# Patient Record
Sex: MALE | Race: WHITE | NOT HISPANIC OR LATINO | Employment: OTHER | URBAN - METROPOLITAN AREA
[De-identification: names, ages, dates, MRNs, and addresses within clinical notes are randomized per-mention and may not be internally consistent; named-entity substitution may affect disease eponyms.]

---

## 2017-01-18 ENCOUNTER — OFFICE VISIT (OUTPATIENT)
Dept: NEUROLOGY | Facility: CLINIC | Age: 77
End: 2017-01-18

## 2017-01-18 VITALS
HEART RATE: 51 BPM | DIASTOLIC BLOOD PRESSURE: 92 MMHG | BODY MASS INDEX: 25.2 KG/M2 | WEIGHT: 176 LBS | OXYGEN SATURATION: 96 % | HEIGHT: 70 IN | SYSTOLIC BLOOD PRESSURE: 184 MMHG

## 2017-01-18 DIAGNOSIS — G31.09 FRONTOTEMPORAL DEMENTIA (HCC): Primary | ICD-10-CM

## 2017-01-18 DIAGNOSIS — F02.80 FRONTOTEMPORAL DEMENTIA (HCC): Primary | ICD-10-CM

## 2017-01-18 PROCEDURE — 99213 OFFICE O/P EST LOW 20 MIN: CPT | Performed by: NURSE PRACTITIONER

## 2017-01-18 RX ORDER — RIVASTIGMINE 13.3 MG/24H
1 PATCH, EXTENDED RELEASE TRANSDERMAL DAILY
Qty: 90 PATCH | Refills: 1 | Status: SHIPPED | OUTPATIENT
Start: 2017-01-18 | End: 2017-07-19 | Stop reason: SDUPTHER

## 2017-01-18 RX ORDER — NEBIVOLOL HYDROCHLORIDE 10 MG/1
TABLET ORAL
COMMUNITY
Start: 2017-01-10 | End: 2017-02-15 | Stop reason: SDUPTHER

## 2017-01-18 NOTE — MR AVS SNAPSHOT
Calos JORDY Genaro   1/18/2017 1:00 PM   Office Visit    Dept Phone:  [phone]   Encounter #:  [id number]    Provider:  ERIKA De La Rosa   Department:  [facility] NEUROLOGY                Your Full Care Plan              Today's Medication Changes          These changes are accurate as of: 1/18/17  1:28 PM.  If you have any questions, ask your nurse or doctor.               New Medication(s)Ordered:     rivastigmine 13.3 MG/24HR patch   Commonly known as:  EXELON   Place 1 patch on the skin Daily.   Replaces:  rivastigmine 9.5 MG/24HR patch         Medication(s)that have changed:     BYSTOLIC 10 MG tablet   Generic drug:  nebivolol   What changed:  Another medication with the same name was removed. Continue taking this medication, and follow the directions you see here.         Stop taking medication(s)listed here:     rivastigmine 9.5 MG/24HR patch   Commonly known as:  EXELON   Replaced by:  rivastigmine 13.3 MG/24HR patch                Where to Get Your Medications      These medications were sent to [facility] [id number] - [place], [place] - [address] [place] [address]. - [phone]  - [phone] [address], [place] [address]     Phone:  [phone]     rivastigmine 13.3 MG/24HR patch                  Your Updated Medication List          This list is accurate as of: 1/18/17  1:28 PM.  Always use your most recent med list.                amLODIPine 10 MG tablet   Commonly known as:  NORVASC       bacitracin 500 UNIT/GM ointment   Apply  topically 2 (two) times a day.       BYSTOLIC 10 MG tablet   Generic drug:  nebivolol       cephalexin 500 MG capsule   Commonly known as:  KEFLEX       glucose monitoring kit monitoring kit   1 each As Needed (blood sugar check).       pravastatin 40 MG tablet   Commonly known as:  PRAVACHOL       rivastigmine 13.3 MG/24HR patch   Commonly known as:  EXELON   Place 1 patch on the skin Daily.       TEKTURNA 300 MG tablet   Generic drug:  aliskiren       vitamin D 02282 UNITS capsule capsule   Commonly known as:  ERGOCALCIFEROL               Instructions     None    Patient Instructions History      Upcoming Appointments     Visit Type Date Time Department    FOLLOW UP 2017  1:00 PM [facility]    NEW PATIENT 2/15/2017  3:30 PM [facility]    FOLLOW UP 2017  1:00 PM [facility]    FOLLOW UP SLEEP CLINIC 2017 10:45 AM [facility]      K[employer] Signup     [facility] sailsquare allows you to send messages to your doctor, view your test results, renew your prescriptions, schedule appointments, and more. To sign up, go to [employer] and click on the Sign Up Now link in the New User? box. Enter your sailsquare Activation Code exactly as it appears below along with the last four digits of your Social Security Number and your Date of Birth () to complete the sign-up process. If you do not sign up before the expiration date, you must request a new code.    sailsquare Activation Code: [email or web address]  Expires: 2017  1:28 PM    If you have questions, you can email [email or web address] or call [phone] to talk to our sailsquare staff. Remember, sailsquare is NOT to be used for urgent needs. For medical emergencies, dial 911.               Other Info from Your Visit           Your Appointments     Feb 15, 2017  3:30 PM EST   New Patient with Karlie Hart MD   [facility] INTERNAL MEDICINE (--)    [address]  [facility] [address] [phone]           Bring all previous medical records and films, along with current medications and insurance information.            2017  1:00 PM EDT   Follow Up with ERIKA De La Rosa   [facility] NEUROLOGY (--)    [address]  [facility] [address] [phone]           Arrive 15 minutes prior to appointment.            Dec 06, 2017  "10:45 AM EST   Follow Up Sleep Clinic with  [facility] PROVID[facility] SCHEDULE   [facility] ([place])    [address] [facility] [address] [phone]            1.  If you are currently on a CPAP or BiPAP please bring in your SD card or memory card.  2.  If you are experiencing problems with your current mask, please bring your mask with you to your appointment.                Allergies     Beta Adrenergic Blockers      Sulfa Antibiotics        Reason for Visit     Dementia           Vital Signs     Blood Pressure Pulse Height Weight Oxygen Saturation Body Mass Index    184/92 51 70\" (177.8 cm) 176 lb (79.8 kg) 96% 25.25 kg/m2    Smoking Status                   Former Smoker             "
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
## 2017-02-15 ENCOUNTER — OFFICE VISIT (OUTPATIENT)
Dept: INTERNAL MEDICINE | Facility: CLINIC | Age: 77
End: 2017-02-15

## 2017-02-15 VITALS
WEIGHT: 174 LBS | OXYGEN SATURATION: 98 % | HEIGHT: 70 IN | BODY MASS INDEX: 24.91 KG/M2 | SYSTOLIC BLOOD PRESSURE: 150 MMHG | HEART RATE: 56 BPM | DIASTOLIC BLOOD PRESSURE: 80 MMHG

## 2017-02-15 DIAGNOSIS — E11.9 TYPE 2 DIABETES MELLITUS WITHOUT COMPLICATION, UNSPECIFIED LONG TERM INSULIN USE STATUS: ICD-10-CM

## 2017-02-15 DIAGNOSIS — I10 ESSENTIAL HYPERTENSION: ICD-10-CM

## 2017-02-15 DIAGNOSIS — Z00.00 MEDICARE ANNUAL WELLNESS VISIT, SUBSEQUENT: Primary | ICD-10-CM

## 2017-02-15 DIAGNOSIS — E78.2 MIXED HYPERLIPIDEMIA: ICD-10-CM

## 2017-02-15 PROCEDURE — 90670 PCV13 VACCINE IM: CPT | Performed by: INTERNAL MEDICINE

## 2017-02-15 PROCEDURE — 90471 IMMUNIZATION ADMIN: CPT | Performed by: INTERNAL MEDICINE

## 2017-02-15 PROCEDURE — G0439 PPPS, SUBSEQ VISIT: HCPCS | Performed by: INTERNAL MEDICINE

## 2017-02-15 PROCEDURE — 99214 OFFICE O/P EST MOD 30 MIN: CPT | Performed by: INTERNAL MEDICINE

## 2017-02-15 PROCEDURE — 93000 ELECTROCARDIOGRAM COMPLETE: CPT | Performed by: INTERNAL MEDICINE

## 2017-02-15 RX ORDER — NEBIVOLOL HYDROCHLORIDE 10 MG/1
5 TABLET ORAL 2 TIMES DAILY
COMMUNITY
Start: 2017-02-15 | End: 2017-08-31 | Stop reason: SDUPTHER

## 2017-02-15 RX ORDER — VALSARTAN AND HYDROCHLOROTHIAZIDE 160; 12.5 MG/1; MG/1
1 TABLET, FILM COATED ORAL DAILY
Qty: 30 TABLET | Refills: 0 | Status: SHIPPED | OUTPATIENT
Start: 2017-02-15 | End: 2017-03-12 | Stop reason: SDUPTHER

## 2017-02-15 NOTE — PROGRESS NOTES
Subjective     Calos Lam is a 76 y.o. male who presents for an annual wellness visit as well as check up of dm, htn, hld.        History of Present Illness     Dm-2.  By A1c, he is a mild well controlled diabetic.    HTN.  Problem recently.  Not well controlled with current regimen.  HLD.  He is maintained on pravastatin without issue and is due for labs.     Review of Systems   Respiratory: Negative.    Cardiovascular: Negative.        The following portions of the patient's history were reviewed and updated as appropriate: allergies, current medications, past family history, past medical history, past social history, past surgical history and problem list.     Patient Active Problem List    Diagnosis Date Noted   • History of bladder cancer 02/18/2017   • RLS (restless legs syndrome) 02/18/2017   • Type 2 diabetes mellitus without complication 02/15/2017   • Essential hypertension    • Mixed hyperlipidemia    • Frontotemporal dementia 01/18/2017   • FIORELLA (obstructive sleep apnea) intolerent CPAP 12/11/2016   • Sleep-related hypoventilation/hypoxia treated with O2 2L NC at HS 12/11/2016       Past Medical History   Diagnosis Date   • Essential hypertension    • GERD (gastroesophageal reflux disease)    • Impacted cerumen    • Impotence of organic origin    • Malaise and fatigue    • Mantoux: positive    • Memory loss    • Mixed hyperlipidemia    • Organic sleep apnea    • Pain in thoracic spine    • Primary malignant neoplasm of bladder 09/2009   • Restless leg syndrome, uncontrolled    • Testicular hypofunction    • Vertigo    • Vitamin D deficiency        Past Surgical History   Procedure Laterality Date   • Colonoscopy  07/31/2012     normal   • Eye surgery  2014     lasik. Dr. Storey   • Turp / transurethral incision / drainage prostate  1980   • Root canal     • Appendectomy  1951   • Lasik Bilateral 2003   • Colon resection left Left 2005       Family History   Problem Relation Age of Onset   • Diabetes  "Sister    • COPD Sister    • Diabetes Brother    • Heart disease Brother    • Hypertension Brother    • Dementia Maternal Grandmother    • Alcohol abuse Neg Hx        Social History     Social History   • Marital status:      Spouse name: N/A   • Number of children: 1   • Years of education: N/A     Occupational History   • [employer] Executive      Social History Main Topics   • Smoking status: Former Smoker     Types: Cigarettes     Quit date: 1973   • Smokeless tobacco: Not on file   • Alcohol use No   • Drug use: No   • Sexual activity: Not on file     Other Topics Concern   • Not on file     Social History Narrative       Current Outpatient Prescriptions on File Prior to Visit   Medication Sig Dispense Refill   • amLODIPine (NORVASC) 10 MG tablet Take 1 tablet by mouth daily.     • bacitracin 500 UNIT/GM ointment Apply  topically 2 (two) times a day. 30 g 0   • cephalexin (KEFLEX) 500 MG capsule      • glucose monitoring kit (FREESTYLE) monitoring kit 1 each As Needed (blood sugar check). 1 each 0   • pravastatin (PRAVACHOL) 40 MG tablet Take 1 tablet by mouth daily.     • rivastigmine (EXELON) 13.3 MG/24HR patch Place 1 patch on the skin Daily. 90 patch 1   • vitamin D (ERGOCALCIFEROL) 18626 UNITS capsule capsule Take 1 capsule by mouth 1 (one) time per week.       No current facility-administered medications on file prior to visit.        Allergies   Allergen Reactions   • Beta Adrenergic Blockers    • Sulfa Antibiotics        Immunization History   Administered Date(s) Administered   • Pneumococcal Conjugate 13-Valent 02/15/2017   • Td, Not Adsorbed 05/04/2014   • Zoster 01/01/2013       Objective     Visit Vitals   • /80   • Pulse 56   • Ht 70\" (177.8 cm)   • Wt 174 lb (78.9 kg)   • SpO2 98%   • BMI 24.97 kg/m2       Physical Exam   Constitutional: He is oriented to person, place, and time. He appears well-developed and well-nourished.   HENT:   Head: Normocephalic and atraumatic.   Right Ear: " Hearing and tympanic membrane normal.   Left Ear: Hearing and tympanic membrane normal.   Mouth/Throat: No posterior oropharyngeal erythema.   Neck: Neck supple. No thyromegaly present.   Cardiovascular: Normal rate, regular rhythm and normal heart sounds.    No murmur heard.  Pulmonary/Chest: Effort normal and breath sounds normal.   Abdominal: Soft. He exhibits no distension. There is no hepatosplenomegaly. There is no tenderness.   Lymphadenopathy:     He has no cervical adenopathy.   Neurological: He is alert and oriented to person, place, and time.   Skin: Skin is warm and dry.   Psychiatric: He has a normal mood and affect.       Assessment/Plan   Calos was seen today for annual wellness visit.    Diagnoses and all orders for this visit:    Medicare annual wellness visit, subsequent  -     CBC & Differential  -     Comprehensive Metabolic Panel  -     Lipid Panel  -     TSH Rfx On Abnormal To Free T4  -     Urinalysis With Microscopic  -     Hemoglobin A1c  -     Microalbumin / Creatinine Urine Ratio    Type 2 diabetes mellitus without complication, unspecified long term insulin use status  -     CBC & Differential  -     Comprehensive Metabolic Panel  -     Lipid Panel  -     TSH Rfx On Abnormal To Free T4  -     Urinalysis With Microscopic  -     Hemoglobin A1c  -     Microalbumin / Creatinine Urine Ratio    Essential hypertension  -     CBC & Differential  -     Comprehensive Metabolic Panel  -     Lipid Panel  -     TSH Rfx On Abnormal To Free T4  -     Urinalysis With Microscopic  -     Hemoglobin A1c  -     Microalbumin / Creatinine Urine Ratio  -     ECG 12 Lead    Mixed hyperlipidemia  -     CBC & Differential  -     Comprehensive Metabolic Panel  -     Lipid Panel  -     TSH Rfx On Abnormal To Free T4  -     Urinalysis With Microscopic  -     Hemoglobin A1c  -     Microalbumin / Creatinine Urine Ratio    Other orders  -     Pneumococcal Conjugate Vaccine 13-Valent All  -      valsartan-hydrochlorothiazide (DIOVAN HCT) 160-12.5 MG per tablet; Take 1 tablet by mouth Daily.  -     Microscopic Examination          ECG 12 Lead  Date/Time: 2/15/2017 3:58 PM  Performed by: FLAQUITO HART  Authorized by: FLAQUITO HART                 Discussion    AWV.  See scanned forms for carrol history, PHQ-9, functional ability questionnaire, cognitive impairment screening.  These were all reviewed with the patient and the patient was provided with a personal prevention plan of service in patient instructions.    DM-2.  Check labs today.    HTN.  Uncontrolled.  D/c Tekturna and trial of Diovan HCT.    HLD.  Continue pravastatin and check labs.      Health Maintenance   Topic Date Due   • DIABETIC FOOT EXAM  04/18/2016   • DIABETIC EYE EXAM  04/18/2016   • ZOSTER VACCINE  02/15/2017   • MEDICARE ANNUAL WELLNESS  02/15/2017   • COLONOSCOPY  07/31/2017   • HEMOGLOBIN A1C  08/15/2017   • PNEUMOCOCCAL VACCINES (65+ LOW/MEDIUM RISK) (2 of 2 - PPSV23) 02/15/2018   • LIPID PANEL  02/15/2018   • URINE MICROALBUMIN  02/15/2018   • TDAP/TD VACCINES (2 - Td) 02/15/2027   • INFLUENZA VACCINE  Completed            Future Appointments  Date Time Provider Department Center   3/1/2017 1:00 PM Flaquito Hart MD [facility] None   7/19/2017 1:00 PM ERIKA De La RosaK [facility] None   12/6/2017 10:45 AM  [facility] PROVIDER SCHEDULE  [facility]

## 2017-02-16 LAB
ALBUMIN SERPL-MCNC: 4.2 G/DL (ref 3.5–5.2)
ALBUMIN/CREAT UR: 34.8 MG/G CREAT (ref 0–30)
ALBUMIN/GLOB SERPL: 1.5 G/DL
ALP SERPL-CCNC: 63 U/L (ref 39–117)
ALT SERPL-CCNC: 14 U/L (ref 1–41)
APPEARANCE UR: CLEAR
AST SERPL-CCNC: 16 U/L (ref 1–40)
BACTERIA #/AREA URNS HPF: ABNORMAL /HPF
BASOPHILS # BLD AUTO: 0.02 10*3/MM3 (ref 0–0.2)
BASOPHILS NFR BLD AUTO: 0.3 % (ref 0–1.5)
BILIRUB SERPL-MCNC: 0.5 MG/DL (ref 0.1–1.2)
BILIRUB UR QL STRIP: NEGATIVE
BUN SERPL-MCNC: 20 MG/DL (ref 8–23)
BUN/CREAT SERPL: 21.1 (ref 7–25)
CALCIUM SERPL-MCNC: 9.4 MG/DL (ref 8.6–10.5)
CASTS URNS MICRO: ABNORMAL
CHLORIDE SERPL-SCNC: 102 MMOL/L (ref 98–107)
CHOLEST SERPL-MCNC: 184 MG/DL (ref 0–200)
CO2 SERPL-SCNC: 27.3 MMOL/L (ref 22–29)
COLOR UR: YELLOW
CREAT SERPL-MCNC: 0.95 MG/DL (ref 0.76–1.27)
CREAT UR-MCNC: 149 MG/DL
EOSINOPHIL # BLD AUTO: 0.15 10*3/MM3 (ref 0–0.7)
EOSINOPHIL NFR BLD AUTO: 2.2 % (ref 0.3–6.2)
EPI CELLS #/AREA URNS HPF: ABNORMAL /HPF
ERYTHROCYTE [DISTWIDTH] IN BLOOD BY AUTOMATED COUNT: 13.3 % (ref 11.5–14.5)
GLOBULIN SER CALC-MCNC: 2.8 GM/DL
GLUCOSE SERPL-MCNC: 99 MG/DL (ref 65–99)
GLUCOSE UR QL: NEGATIVE
HBA1C MFR BLD: 6.04 % (ref 4.8–5.6)
HCT VFR BLD AUTO: 47.7 % (ref 40.4–52.2)
HDLC SERPL-MCNC: 51 MG/DL (ref 40–60)
HGB BLD-MCNC: 16.4 G/DL (ref 13.7–17.6)
HGB UR QL STRIP: NEGATIVE
IMM GRANULOCYTES # BLD: 0 10*3/MM3 (ref 0–0.03)
IMM GRANULOCYTES NFR BLD: 0 % (ref 0–0.5)
KETONES UR QL STRIP: NEGATIVE
LDLC SERPL CALC-MCNC: 113 MG/DL (ref 0–100)
LEUKOCYTE ESTERASE UR QL STRIP: NEGATIVE
LYMPHOCYTES # BLD AUTO: 1.54 10*3/MM3 (ref 0.9–4.8)
LYMPHOCYTES NFR BLD AUTO: 22.7 % (ref 19.6–45.3)
MCH RBC QN AUTO: 31.7 PG (ref 27–32.7)
MCHC RBC AUTO-ENTMCNC: 34.4 G/DL (ref 32.6–36.4)
MCV RBC AUTO: 92.1 FL (ref 79.8–96.2)
MICROALBUMIN UR-MCNC: 51.9 UG/ML
MONOCYTES # BLD AUTO: 0.6 10*3/MM3 (ref 0.2–1.2)
MONOCYTES NFR BLD AUTO: 8.8 % (ref 5–12)
NEUTROPHILS # BLD AUTO: 4.48 10*3/MM3 (ref 1.9–8.1)
NEUTROPHILS NFR BLD AUTO: 66 % (ref 42.7–76)
NITRITE UR QL STRIP: NEGATIVE
PH UR STRIP: 6 [PH] (ref 5–8)
PLATELET # BLD AUTO: 227 10*3/MM3 (ref 140–500)
POTASSIUM SERPL-SCNC: 4.4 MMOL/L (ref 3.5–5.2)
PROT SERPL-MCNC: 7 G/DL (ref 6–8.5)
PROT UR QL STRIP: (no result)
RBC # BLD AUTO: 5.18 10*6/MM3 (ref 4.6–6)
RBC #/AREA URNS HPF: ABNORMAL /HPF
SODIUM SERPL-SCNC: 143 MMOL/L (ref 136–145)
SP GR UR: 1.02 (ref 1–1.03)
TRIGL SERPL-MCNC: 100 MG/DL (ref 0–150)
TSH SERPL DL<=0.005 MIU/L-ACNC: 1.14 MIU/ML (ref 0.27–4.2)
UROBILINOGEN UR STRIP-MCNC: (no result) MG/DL
VLDLC SERPL CALC-MCNC: 20 MG/DL (ref 5–40)
WBC # BLD AUTO: 6.79 10*3/MM3 (ref 4.5–10.7)
WBC #/AREA URNS HPF: ABNORMAL /HPF

## 2017-02-18 PROBLEM — Z85.51 HISTORY OF BLADDER CANCER: Status: ACTIVE | Noted: 2017-02-18

## 2017-02-18 PROBLEM — G25.81 RLS (RESTLESS LEGS SYNDROME): Status: ACTIVE | Noted: 2017-02-18

## 2017-02-18 NOTE — PATIENT INSTRUCTIONS
Medicare Wellness  Personal Prevention Plan of Service     Date of Office Visit:  02/15/2017  Encounter Provider:  Karlie Hart MD  Place of Service:  [facility] INTERNAL MEDICINE  Patient Name: Calos Lam  :  1940    As part of the Medicare Wellness portion of your visit today, we are providing you with this personalized preventive plan of services (PPPS). This plan is based upon recommendations of the [place] Preventive Services Task Force (USPSTF) and the Advisory Committee on Immunization Practices (ACIP).    This lists the preventive care services that should be considered, and provides dates of when you are due. Items listed as completed are up-to-date and do not require any further intervention.    Health Maintenance   Topic Date Due   • DIABETIC FOOT EXAM  2016   • DIABETIC EYE EXAM  2016   • ZOSTER VACCINE  02/15/2017   • MEDICARE ANNUAL WELLNESS  02/15/2017   • COLONOSCOPY  2017   • HEMOGLOBIN A1C  08/15/2017   • PNEUMOCOCCAL VACCINES (65+ LOW/MEDIUM RISK) (2 of 2 - PPSV23) 02/15/2018   • LIPID PANEL  02/15/2018   • URINE MICROALBUMIN  02/15/2018   • TDAP/TD VACCINES (2 - Td) 02/15/2027   • INFLUENZA VACCINE  Completed         Calos was seen today for annual wellness visit.    Diagnoses and all orders for this visit:    Medicare annual wellness visit, subsequent  -     CBC & Differential  -     Comprehensive Metabolic Panel  -     Lipid Panel  -     TSH Rfx On Abnormal To Free T4  -     Urinalysis With Microscopic  -     Hemoglobin A1c  -     Microalbumin / Creatinine Urine Ratio    Type 2 diabetes mellitus without complication, unspecified long term insulin use status  -     CBC & Differential  -     Comprehensive Metabolic Panel  -     Lipid Panel  -     TSH Rfx On Abnormal To Free T4  -     Urinalysis With Microscopic  -     Hemoglobin A1c  -     Microalbumin / Creatinine Urine Ratio    Essential hypertension  -     CBC & Differential  -      Comprehensive Metabolic Panel  -     Lipid Panel  -     TSH Rfx On Abnormal To Free T4  -     Urinalysis With Microscopic  -     Hemoglobin A1c  -     Microalbumin / Creatinine Urine Ratio  -     ECG 12 Lead    Mixed hyperlipidemia  -     CBC & Differential  -     Comprehensive Metabolic Panel  -     Lipid Panel  -     TSH Rfx On Abnormal To Free T4  -     Urinalysis With Microscopic  -     Hemoglobin A1c  -     Microalbumin / Creatinine Urine Ratio    Other orders  -     Pneumococcal Conjugate Vaccine 13-Valent All  -     valsartan-hydrochlorothiazide (DIOVAN HCT) 160-12.5 MG per tablet; Take 1 tablet by mouth Daily.  -     Microscopic Examination          ECG 12 Lead/Time: 2/15/2017 3:58 PM  Performed by: FLAQUITO DOS SANTOS  Authorized by: FLAQUITO DOS SANTOS                 Discussion    AWV.  See scanned forms for carrol history, PHQ-9, functional ability questionnaire, cognitive impairment screening.  These were all reviewed with the patient and the patient was provided with a personal prevention plan of service in patient instructions.    DM-2.  Check labs today.    HTN.  Uncontrolled.  D/c Tekturna and trial of Diovan HCT.    HLD.  Continue pravastatin and check labs.      Health Maintenance   Topic Date Due   • DIABETIC FOOT EXAM  04/18/2016   • DIABETIC EYE EXAM  04/18/2016   • ZOSTER VACCINE  02/15/2017   • MEDICARE ANNUAL WELLNESS  02/15/2017   • COLONOSCOPY  07/31/2017   • HEMOGLOBIN A1C  08/15/2017   • PNEUMOCOCCAL VACCINES (65+ LOW/MEDIUM RISK) (2 of 2 - PPSV23) 02/15/2018   • LIPID PANEL  02/15/2018   • URINE MICROALBUMIN  02/15/2018   • TDAP/TD VACCINES (2 - Td) 02/15/2027   • INFLUENZA VACCINE  Completed

## 2017-03-01 ENCOUNTER — OFFICE VISIT (OUTPATIENT)
Dept: INTERNAL MEDICINE | Facility: CLINIC | Age: 77
End: 2017-03-01

## 2017-03-01 VITALS
HEIGHT: 71 IN | HEART RATE: 57 BPM | BODY MASS INDEX: 24.5 KG/M2 | OXYGEN SATURATION: 98 % | SYSTOLIC BLOOD PRESSURE: 140 MMHG | WEIGHT: 175 LBS | DIASTOLIC BLOOD PRESSURE: 80 MMHG

## 2017-03-01 DIAGNOSIS — I10 ESSENTIAL HYPERTENSION: Primary | ICD-10-CM

## 2017-03-01 PROCEDURE — 99212 OFFICE O/P EST SF 10 MIN: CPT | Performed by: INTERNAL MEDICINE

## 2017-03-01 NOTE — PROGRESS NOTES
"Subjective     Calos Lam is a 76 y.o. male who presents with   Chief Complaint   Patient presents with   • Hypertension       History of Present Illness     HTN.  Good control with change to from Tekturna to Diovan HCT.  No side effects.    Review of Systems    The following portions of the patient's history were reviewed and updated as appropriate: allergies, current medications and problem list.    Patient Active Problem List    Diagnosis Date Noted   • History of bladder cancer 02/18/2017   • RLS (restless legs syndrome) 02/18/2017   • Type 2 diabetes mellitus without complication 02/15/2017   • Essential hypertension    • Mixed hyperlipidemia    • Frontotemporal dementia 01/18/2017   • FIORELLA (obstructive sleep apnea) intolerent CPAP 12/11/2016   • Sleep-related hypoventilation/hypoxia treated with O2 2L NC at HS 12/11/2016       Current Outpatient Prescriptions on File Prior to Visit   Medication Sig Dispense Refill   • amLODIPine (NORVASC) 10 MG tablet Take 1 tablet by mouth daily.     • bacitracin 500 UNIT/GM ointment Apply  topically 2 (two) times a day. 30 g 0   • BYSTOLIC 10 MG tablet Take 0.5 tablets by mouth 2 (Two) Times a Day.     • glucose monitoring kit (FREESTYLE) monitoring kit 1 each As Needed (blood sugar check). 1 each 0   • pravastatin (PRAVACHOL) 40 MG tablet Take 1 tablet by mouth daily.     • rivastigmine (EXELON) 13.3 MG/24HR patch Place 1 patch on the skin Daily. 90 patch 1   • valsartan-hydrochlorothiazide (DIOVAN HCT) 160-12.5 MG per tablet Take 1 tablet by mouth Daily. 30 tablet 0   • vitamin D (ERGOCALCIFEROL) 24242 UNITS capsule capsule Take 1 capsule by mouth 1 (one) time per week.     • [DISCONTINUED] cephalexin (KEFLEX) 500 MG capsule        No current facility-administered medications on file prior to visit.        Objective     Visit Vitals   • /80   • Pulse 57   • Ht 71\" (180.3 cm)   • Wt 175 lb (79.4 kg)   • SpO2 98%   • BMI 24.41 kg/m2       Physical Exam "   Constitutional: He is oriented to person, place, and time. He appears well-developed and well-nourished.   HENT:   Head: Atraumatic.   Neurological: He is alert and oriented to person, place, and time.   Psychiatric: He has a normal mood and affect.       Assessment/Plan   Calos was seen today for hypertension.    Diagnoses and all orders for this visit:    Essential hypertension  -     Basic Metabolic Panel        Discussion  HTN F/u.  Continue current regimen.  Check BMP.         Future Appointments  Date Time Provider Department Center   7/19/2017 1:00 PM ERIKA De La Rosa N [facility] None   9/1/2017 1:15 PM Karlie Hart MD [facility] None   12/6/2017 10:45 AM  [facility] PROVIDER SCHEDULE  [facility]

## 2017-03-02 LAB
BUN SERPL-MCNC: 26 MG/DL (ref 8–23)
BUN/CREAT SERPL: 23.6 (ref 7–25)
CALCIUM SERPL-MCNC: 9.7 MG/DL (ref 8.6–10.5)
CHLORIDE SERPL-SCNC: 101 MMOL/L (ref 98–107)
CO2 SERPL-SCNC: 28.3 MMOL/L (ref 22–29)
CREAT SERPL-MCNC: 1.1 MG/DL (ref 0.76–1.27)
GLUCOSE SERPL-MCNC: 146 MG/DL (ref 65–99)
POTASSIUM SERPL-SCNC: 4.2 MMOL/L (ref 3.5–5.2)
SODIUM SERPL-SCNC: 144 MMOL/L (ref 136–145)

## 2017-03-13 RX ORDER — VALSARTAN AND HYDROCHLOROTHIAZIDE 160; 12.5 MG/1; MG/1
TABLET, FILM COATED ORAL
Qty: 30 TABLET | Refills: 0 | Status: SHIPPED | OUTPATIENT
Start: 2017-03-13 | End: 2017-04-17 | Stop reason: SDUPTHER

## 2017-04-17 RX ORDER — VALSARTAN AND HYDROCHLOROTHIAZIDE 160; 12.5 MG/1; MG/1
TABLET, FILM COATED ORAL
Qty: 30 TABLET | Refills: 0 | Status: SHIPPED | OUTPATIENT
Start: 2017-04-17 | End: 2017-05-09 | Stop reason: SDUPTHER

## 2017-05-09 RX ORDER — VALSARTAN AND HYDROCHLOROTHIAZIDE 160; 12.5 MG/1; MG/1
TABLET, FILM COATED ORAL
Qty: 30 TABLET | Refills: 0 | Status: SHIPPED | OUTPATIENT
Start: 2017-05-09 | End: 2017-06-17 | Stop reason: SDUPTHER

## 2017-06-19 RX ORDER — VALSARTAN AND HYDROCHLOROTHIAZIDE 160; 12.5 MG/1; MG/1
TABLET, FILM COATED ORAL
Qty: 30 TABLET | Refills: 5 | Status: SHIPPED | OUTPATIENT
Start: 2017-06-19 | End: 2017-08-31 | Stop reason: SDUPTHER

## 2017-07-19 ENCOUNTER — OFFICE VISIT (OUTPATIENT)
Dept: NEUROLOGY | Facility: CLINIC | Age: 77
End: 2017-07-19

## 2017-07-19 VITALS
WEIGHT: 168 LBS | OXYGEN SATURATION: 96 % | DIASTOLIC BLOOD PRESSURE: 68 MMHG | BODY MASS INDEX: 23.52 KG/M2 | HEART RATE: 49 BPM | SYSTOLIC BLOOD PRESSURE: 134 MMHG | HEIGHT: 71 IN

## 2017-07-19 DIAGNOSIS — G31.09 FRONTOTEMPORAL DEMENTIA (HCC): Primary | ICD-10-CM

## 2017-07-19 DIAGNOSIS — F02.80 FRONTOTEMPORAL DEMENTIA (HCC): Primary | ICD-10-CM

## 2017-07-19 PROCEDURE — 99212 OFFICE O/P EST SF 10 MIN: CPT | Performed by: NURSE PRACTITIONER

## 2017-07-19 RX ORDER — RIVASTIGMINE 13.3 MG/24H
1 PATCH, EXTENDED RELEASE TRANSDERMAL DAILY
Qty: 90 PATCH | Refills: 1 | Status: SHIPPED | OUTPATIENT
Start: 2017-07-19 | End: 2018-03-16 | Stop reason: SDUPTHER

## 2017-07-19 NOTE — PROGRESS NOTES
Subjective:     Patient ID: Calos Lam is a 76 y.o. male presenting for follow up for frontotemporal dementia. My last visit with the patient was on November 15, 2016. He is maintained on Exelon 13.3 mg/ 24 hours. He has been doing very well. He remains active. He walks at least 2-3 days per week and works in his garden daily. 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 Since his last visit he has stopped drinking alcohol entirely. Overall he and his wife feel he is doing very well.    Dementia   Pertinent negatives include no abdominal pain, chest pain, fatigue, headaches, nausea, neck pain, numbness, rash, vomiting or weakness.     The following portions of the patient's history were reviewed and updated as appropriate: allergies, current medications, past family history, past medical history, past social history, past surgical history and problem list.    Review of Systems   Constitutional: Negative for activity change, appetite change and fatigue.   HENT: Negative for facial swelling, hearing loss and trouble swallowing.    Eyes: Negative for photophobia, pain and visual disturbance.   Respiratory: Negative for choking, chest tightness and shortness of breath.    Cardiovascular: Negative for chest pain, palpitations and leg swelling.   Gastrointestinal: Negative for abdominal pain, nausea and vomiting.   Endocrine: Negative for polydipsia and polyphagia.   Genitourinary: Positive for frequency and urgency. Negative for difficulty urinating.   Musculoskeletal: Negative for back pain, gait problem and neck pain.   Skin: Negative for color change, rash and wound.   Allergic/Immunologic: Negative for environmental allergies, food allergies and immunocompromised state.   Neurological: Negative for dizziness, tremors, seizures, syncope, facial asymmetry, speech difficulty, weakness, light-headedness, numbness and headaches.   Hematological: Negative for adenopathy. Does not bruise/bleed easily.    Psychiatric/Behavioral: Positive for decreased concentration. Negative for agitation, behavioral problems, confusion, dysphoric mood, hallucinations, self-injury, sleep disturbance and suicidal ideas. The patient is not nervous/anxious and is not hyperactive.         Objective:    The following exam was performed today and there has been no change since his last visit.    Neurologic Exam  Neurologic Exam  This patient was well-developed, well-nourished and in no acute distress.      GAIT: Gait, station, heel, toe and tandem walk were normal. There was no drift of the arms. Romberg’s sign was not present.       VASCULAR: The carotid arteries had normal upstroke to palpation without bruits. The vertebral arteries were without bruits. The radial pulses were equal and without delay. Cardiac examination revealed no murmurs with a regular rhythm. Pedal pulses were normal. The extremities were without cyanosis, clubbing or edema.      MENTAL STATUS: This patient was alert and oriented to person, place, time and situation. Recent and remote memory - intact. Attention span, fund of knowledge and concentration were normal. More in depth memory testing-reviewed Dr. Flaherty's report and reviewed brain CT images CRANIAL NERVES: Olfaction-not tested. Visual fields full in all quadrants to confrontation. The pupils were equally round and reactive to light at 3-4 mm. There was no evidence of a Sanchez Essence pupil. Funduscopic exam revealed no papilledema, hemorrhage or exudate. The gaze was conjugate. The vertical and horizontal eye movements were full without nystagmus. There was no facial weakness. There was no facial sensory loss to pinprick bilaterally. Hearing was normal for light finger rub and casual speech. Speech is normal with trace  dysarthria. The neck is supple and strength is normal in rotation, flexion and extension. Tongue and palate movements are normal. He has had eyelid surgery for ptosis recently      MOTOR UPPER  EXTREMTIES: Bilaterally the deltoid, biceps, triceps, wrist extensors, intrinsic hand muscles, and  were grade 5 and normal. Bulk, tone and strength of these muscles were normal without abnormal movements.      MOTOR LOWER EXTREMITIES: Bilaterally the hip flexors, hip abductors, knee flexors and extensors, ankle plantar flexors and dorsiflexors were grade 5 with normal. Bulk, tone and strength of these muscles were normal without abnormal movements.  DEEP TENDON REFLEXES: Bilateral biceps, triceps, and brachioradialis reflexes were grade 1 symmetric. Bilateral knee, hamstrings and ankle reflexes were grade 1 and symmetric. The plantar responses were downgoing. Ankle clonus was not present.      CEREBELLAR: Finger to nose, rapid finger opposition, and heel-to-shin tests were performed normally, bilaterally.      MUSCULOSKELETAL: Normal range of motion of the neck is noted. There was no back pain with straight leg raise. Internal and external rotation of the hips was normal.       SENSORY: There was normal upper and lower extremity sensation to vibration, proprioception, and pinprick.      HIGHER CORTICAL FUNCTION: There were no aphasic or apraxic errors. Visual fields were intact to confrontation.      Physical Exam  Constitutional: He appears well-developed and well-nourished.   HENT:    Head: Normocephalic.   Cardiovascular: Normal rate, regular rhythm and normal heart sounds.   Pulmonary/Chest: Effort normal.   Musculoskeletal: Normal range of motion.     Assessment/Plan:     Calos was seen today for dementia.    Diagnoses and all orders for this visit:    Frontotemporal dementia    Other orders  -     rivastigmine (EXELON) 13.3 MG/24HR patch; Place 1 patch on the skin Daily.   Mr. Lam is doing very well. He is to continue Exelon patch 13.3 mg/24 hours. I encouraged him to remain physically, cognitively, and socially active and to continue to refrain from alcohol.     F/u 6 months.

## 2017-08-31 ENCOUNTER — OFFICE VISIT (OUTPATIENT)
Dept: INTERNAL MEDICINE | Facility: CLINIC | Age: 77
End: 2017-08-31

## 2017-08-31 VITALS
HEART RATE: 59 BPM | SYSTOLIC BLOOD PRESSURE: 118 MMHG | HEIGHT: 71 IN | BODY MASS INDEX: 23.52 KG/M2 | DIASTOLIC BLOOD PRESSURE: 68 MMHG | WEIGHT: 168 LBS | OXYGEN SATURATION: 92 %

## 2017-08-31 DIAGNOSIS — I10 ESSENTIAL HYPERTENSION: ICD-10-CM

## 2017-08-31 DIAGNOSIS — E78.2 MIXED HYPERLIPIDEMIA: ICD-10-CM

## 2017-08-31 DIAGNOSIS — E11.9 TYPE 2 DIABETES MELLITUS WITHOUT COMPLICATION, UNSPECIFIED LONG TERM INSULIN USE STATUS: Primary | ICD-10-CM

## 2017-08-31 PROBLEM — K63.5 COLON POLYP: Status: ACTIVE | Noted: 2017-07-25

## 2017-08-31 PROCEDURE — 99214 OFFICE O/P EST MOD 30 MIN: CPT | Performed by: INTERNAL MEDICINE

## 2017-08-31 RX ORDER — AMLODIPINE BESYLATE 10 MG/1
10 TABLET ORAL DAILY
Qty: 90 TABLET | Refills: 3 | Status: SHIPPED | OUTPATIENT
Start: 2017-08-31 | End: 2018-11-20 | Stop reason: SDUPTHER

## 2017-08-31 RX ORDER — NEBIVOLOL HYDROCHLORIDE 10 MG/1
10 TABLET ORAL DAILY
Qty: 90 TABLET | Refills: 3 | Status: SHIPPED | OUTPATIENT
Start: 2017-08-31 | End: 2018-07-26 | Stop reason: SDUPTHER

## 2017-08-31 RX ORDER — VALSARTAN AND HYDROCHLOROTHIAZIDE 160; 12.5 MG/1; MG/1
1 TABLET, FILM COATED ORAL DAILY
Qty: 90 TABLET | Refills: 3 | Status: SHIPPED | OUTPATIENT
Start: 2017-08-31 | End: 2018-07-25

## 2017-08-31 RX ORDER — PRAVASTATIN SODIUM 40 MG
40 TABLET ORAL DAILY
Qty: 90 TABLET | Refills: 3 | Status: SHIPPED | OUTPATIENT
Start: 2017-08-31 | End: 2018-07-26 | Stop reason: SDUPTHER

## 2017-08-31 RX ORDER — TAMSULOSIN HYDROCHLORIDE 0.4 MG/1
1 CAPSULE ORAL DAILY
COMMUNITY
End: 2018-07-26 | Stop reason: SDUPTHER

## 2017-09-01 LAB
ALBUMIN SERPL-MCNC: 4.2 G/DL (ref 3.5–5.2)
ALBUMIN/GLOB SERPL: 1.8 G/DL
ALP SERPL-CCNC: 53 U/L (ref 39–117)
ALT SERPL-CCNC: 15 U/L (ref 1–41)
AST SERPL-CCNC: 14 U/L (ref 1–40)
BASOPHILS # BLD AUTO: 0.01 10*3/MM3 (ref 0–0.2)
BASOPHILS NFR BLD AUTO: 0.1 % (ref 0–1.5)
BILIRUB SERPL-MCNC: 0.4 MG/DL (ref 0.1–1.2)
BUN SERPL-MCNC: 22 MG/DL (ref 8–23)
BUN/CREAT SERPL: 23.9 (ref 7–25)
CALCIUM SERPL-MCNC: 9.6 MG/DL (ref 8.6–10.5)
CHLORIDE SERPL-SCNC: 101 MMOL/L (ref 98–107)
CO2 SERPL-SCNC: 31 MMOL/L (ref 22–29)
CREAT SERPL-MCNC: 0.92 MG/DL (ref 0.76–1.27)
EOSINOPHIL # BLD AUTO: 0.19 10*3/MM3 (ref 0–0.7)
EOSINOPHIL NFR BLD AUTO: 2.7 % (ref 0.3–6.2)
ERYTHROCYTE [DISTWIDTH] IN BLOOD BY AUTOMATED COUNT: 13.5 % (ref 11.5–14.5)
GLOBULIN SER CALC-MCNC: 2.4 GM/DL
GLUCOSE SERPL-MCNC: 90 MG/DL (ref 65–99)
HBA1C MFR BLD: 5.91 % (ref 4.8–5.6)
HCT VFR BLD AUTO: 45.5 % (ref 40.4–52.2)
HGB BLD-MCNC: 15.2 G/DL (ref 13.7–17.6)
IMM GRANULOCYTES # BLD: 0 10*3/MM3 (ref 0–0.03)
IMM GRANULOCYTES NFR BLD: 0 % (ref 0–0.5)
LYMPHOCYTES # BLD AUTO: 1.6 10*3/MM3 (ref 0.9–4.8)
LYMPHOCYTES NFR BLD AUTO: 22.4 % (ref 19.6–45.3)
MCH RBC QN AUTO: 31.5 PG (ref 27–32.7)
MCHC RBC AUTO-ENTMCNC: 33.4 G/DL (ref 32.6–36.4)
MCV RBC AUTO: 94.2 FL (ref 79.8–96.2)
MONOCYTES # BLD AUTO: 0.63 10*3/MM3 (ref 0.2–1.2)
MONOCYTES NFR BLD AUTO: 8.8 % (ref 5–12)
NEUTROPHILS # BLD AUTO: 4.71 10*3/MM3 (ref 1.9–8.1)
NEUTROPHILS NFR BLD AUTO: 66 % (ref 42.7–76)
PLATELET # BLD AUTO: 218 10*3/MM3 (ref 140–500)
POTASSIUM SERPL-SCNC: 4.2 MMOL/L (ref 3.5–5.2)
PROT SERPL-MCNC: 6.6 G/DL (ref 6–8.5)
RBC # BLD AUTO: 4.83 10*6/MM3 (ref 4.6–6)
SODIUM SERPL-SCNC: 144 MMOL/L (ref 136–145)
WBC # BLD AUTO: 7.14 10*3/MM3 (ref 4.5–10.7)

## 2017-09-25 ENCOUNTER — TELEPHONE (OUTPATIENT)
Dept: INTERNAL MEDICINE | Facility: CLINIC | Age: 77
End: 2017-09-25

## 2017-09-25 DIAGNOSIS — F02.80 FRONTOTEMPORAL DEMENTIA (HCC): Primary | ICD-10-CM

## 2017-09-25 DIAGNOSIS — G31.09 FRONTOTEMPORAL DEMENTIA (HCC): Primary | ICD-10-CM

## 2017-09-25 NOTE — TELEPHONE ENCOUNTER
Pt wife called and her  needs a referral for annual evaluation with neuropysch. He sees Dr. Carmen Flaherty. LG    Referral is placed.  It would be good to get number.  SLW

## 2017-10-13 ENCOUNTER — FLU SHOT (OUTPATIENT)
Dept: INTERNAL MEDICINE | Facility: CLINIC | Age: 77
End: 2017-10-13

## 2017-10-13 PROCEDURE — 90662 IIV NO PRSV INCREASED AG IM: CPT | Performed by: INTERNAL MEDICINE

## 2017-10-13 PROCEDURE — 90471 IMMUNIZATION ADMIN: CPT | Performed by: INTERNAL MEDICINE

## 2017-11-14 RX ORDER — ERGOCALCIFEROL 1.25 MG/1
50000 CAPSULE ORAL WEEKLY
Qty: 60 CAPSULE | Refills: 3 | Status: SHIPPED | OUTPATIENT
Start: 2017-11-14 | End: 2018-12-22 | Stop reason: SDUPTHER

## 2017-12-04 ENCOUNTER — TELEPHONE (OUTPATIENT)
Dept: INTERNAL MEDICINE | Facility: CLINIC | Age: 77
End: 2017-12-04

## 2017-12-04 NOTE — TELEPHONE ENCOUNTER
Needs order sent to [facility] to have driving test. Fax [phone]. CLC    Rx written.  SLW    Patient advised order faxed. CLC

## 2017-12-06 ENCOUNTER — OFFICE VISIT (OUTPATIENT)
Dept: SLEEP MEDICINE | Facility: HOSPITAL | Age: 77
End: 2017-12-06
Attending: INTERNAL MEDICINE

## 2017-12-06 DIAGNOSIS — IMO0002 SLEEP-RELATED HYPOVENTILATION: ICD-10-CM

## 2017-12-06 DIAGNOSIS — G47.33 OSA (OBSTRUCTIVE SLEEP APNEA): Primary | ICD-10-CM

## 2017-12-06 PROCEDURE — 99213 OFFICE O/P EST LOW 20 MIN: CPT | Performed by: INTERNAL MEDICINE

## 2017-12-06 PROCEDURE — G0463 HOSPITAL OUTPT CLINIC VISIT: HCPCS

## 2017-12-06 NOTE — PROGRESS NOTES
Follow Up Sleep Disorders Center Note       Patient Care Team:  Karlie Hart MD as PCP - General (Internal Medicine)  Hayden Santiago MD as Neurologist (Neurology)  Julian Dahl MD as Consulting Physician (Sleep Medicine)    Chief Complaint:  FIORELLA     Interval History:   The patient was last seen by me in December 2016.  He has FIORELLA with sleep-related hypoxemia.  He uses O2 2 L at at bedtime.  The patient states he is unchanged.  He goes to bed at midnight and awakens between 6 and 7 AM.  He awakens once to urinate.  [place] Sleepiness Scale is normal at 2.    The patient is here with his wife and they reports some changes in his dementia status.    Review of Systems:  Recorded on the Sleep Questionnaire.  Unremarkable .    Social History:  He will have 3-4 caffeinated beverages a day.  Social History     Social History   • Marital status:      Spouse name: N/A   • Number of children: 1   • Years of education: N/A     Occupational History   • Healthcare Executive      Social History Main Topics   • Smoking status: Former Smoker     Types: Cigarettes     Quit date: 1973   • Smokeless tobacco: Not on file   • Alcohol use No   • Drug use: No   • Sexual activity: Not on file     Other Topics Concern   • Not on file     Social History Narrative       Allergies:  Beta adrenergic blockers and Sulfa antibiotics     Medication Review:  Reviewed.      Vital Signs:  Height 69.5 inches and weight 175 and he is borderline overweight with a body mass index of 25-26.    Physical Exam:    Constitutional:  Well developed white male and appears in no apparent distress.  Awake & oriented times 3.  Normal mood with normal recent and remote memory and normal judgement.  Eyes:  Conjunctivae normal.  Oropharynx:  moist mucous membranes without exudate and a normal sized tongue and uvula with moderate-severe narrowing of the posterior pharyngeal opening.      Results Review:  DME is [employer].  The patient uses O2 2 L at at  bedtime.         Impression:   Obstructive sleep apnea with sleep-related hypoxemia, previously intolerant to positive airway pressure therapy, and he uses O2 2 L at at bedtime be a nasal cannula.      Plan:  Good sleep hygiene measures should be maintained.  Some weight loss would be beneficial in this patient who is overweight by BMI.  The patient is benefiting from the treatment being provided.     The patient is curious about new interfaces.  The patient was provided with a large Air Fit F 20 and a medium Jaylyn View.  Tentatively, a split night study will be scheduled in January if he can tolerate the new interfaces with his old device.  If he cannot tolerate it, he will continue with his oxygen.    The patient will call for any problems and will follow up as determined by the overnight polysomnogram.        Julian Dahl MD  12/06/17  11:20 AM

## 2018-01-20 ENCOUNTER — HOSPITAL ENCOUNTER (OUTPATIENT)
Dept: SLEEP MEDICINE | Facility: HOSPITAL | Age: 78
Discharge: HOME | End: 2018-01-20
Admitting: INTERNAL MEDICINE

## 2018-01-20 DIAGNOSIS — IMO0002 SLEEP-RELATED HYPOVENTILATION: ICD-10-CM

## 2018-01-20 DIAGNOSIS — G47.33 OSA (OBSTRUCTIVE SLEEP APNEA): ICD-10-CM

## 2018-01-20 PROCEDURE — 95810 POLYSOM 6/> YRS 4/> PARAM: CPT | Performed by: INTERNAL MEDICINE

## 2018-01-20 PROCEDURE — 95810 POLYSOM 6/> YRS 4/> PARAM: CPT

## 2018-01-23 ENCOUNTER — TELEPHONE (OUTPATIENT)
Dept: SLEEP MEDICINE | Facility: HOSPITAL | Age: 78
End: 2018-01-23

## 2018-01-23 NOTE — TELEPHONE ENCOUNTER
Tech called home #, no answer left vm stating to return call to inform pt to follow up with  at [facility]. MAB

## 2018-01-25 ENCOUNTER — OFFICE VISIT (OUTPATIENT)
Dept: NEUROLOGY | Facility: CLINIC | Age: 78
End: 2018-01-25

## 2018-01-25 VITALS
SYSTOLIC BLOOD PRESSURE: 132 MMHG | WEIGHT: 172 LBS | BODY MASS INDEX: 24.62 KG/M2 | HEIGHT: 70 IN | DIASTOLIC BLOOD PRESSURE: 78 MMHG

## 2018-01-25 DIAGNOSIS — F02.80 FRONTOTEMPORAL DEMENTIA (HCC): Primary | ICD-10-CM

## 2018-01-25 DIAGNOSIS — G31.09 FRONTOTEMPORAL DEMENTIA (HCC): Primary | ICD-10-CM

## 2018-01-25 PROCEDURE — 99213 OFFICE O/P EST LOW 20 MIN: CPT | Performed by: NURSE PRACTITIONER

## 2018-01-25 NOTE — PROGRESS NOTES
Subjective:     Patient ID: Calos Lam is a 77 y.o. male presenting for follow up for frontotemporal dementia. My last visit with the patient was on July 19, 2017. He is maintained on Exelon 13.3 mg/24 hours. He has been doing fairly well since his last visit. He had repeat neuropsychology testing in October 2017 that did reveal decline in areas of attention, executive memory, verbal memory, visual memory, fluency, and language.     Dementia   Pertinent negatives include no abdominal pain, chest pain, fatigue, headaches, nausea, neck pain, numbness, vomiting or weakness.     The following portions of the patient's history were reviewed and updated as appropriate: allergies, current medications, past family history, past medical history, past social history, past surgical history and problem list.    Review of Systems   Constitutional: Negative for activity change, appetite change and fatigue.   HENT: Negative for facial swelling, hearing loss and trouble swallowing.    Eyes: Negative for photophobia, pain and visual disturbance.   Respiratory: Negative for choking, chest tightness and shortness of breath.    Cardiovascular: Negative for chest pain and leg swelling.   Gastrointestinal: Negative for abdominal pain, nausea and vomiting.   Endocrine: Negative for polydipsia and polyphagia.   Musculoskeletal: Positive for gait problem. Negative for back pain and neck pain.   Neurological: Positive for tremors and speech difficulty. Negative for dizziness, seizures, syncope, facial asymmetry, weakness, light-headedness, numbness and headaches.   Hematological: Negative for adenopathy. Bruises/bleeds easily.   Psychiatric/Behavioral: Negative for agitation, behavioral problems, confusion, decreased concentration, dysphoric mood, hallucinations, self-injury, sleep disturbance and suicidal ideas. The patient is hyperactive. The patient is not nervous/anxious.         Objective:    Neurologic Exam  This patient was  well-developed, well-nourished and in no acute distress.      GAIT: Gait, station, heel, toe and tandem walk were normal. There was no drift of the arms. Romberg’s sign was not present.       VASCULAR: The carotid arteries had normal upstroke to palpation without bruits. The vertebral arteries were without bruits. The radial pulses were equal and without delay. Cardiac examination revealed no murmurs with a regular rhythm. Pedal pulses were normal. The extremities were without cyanosis, clubbing or edema.      MENTAL STATUS: This patient was alert and oriented to person, place, time and situation. Recent and remote memory - intact. Attention span, fund of knowledge and concentration were normal. He had difficulty repeating simple sentences, mixes up several words. More in depth memory testing-reviewed Dr. Flaherty's report.    CRANIAL NERVES: Olfaction-not tested. Visual fields full in all quadrants to confrontation. The pupils were equally round and reactive to light at 3-4 mm. There was no evidence of a Sanchez Essence pupil. Funduscopic exam revealed no papilledema, hemorrhage or exudate. The gaze was conjugate. The vertical and horizontal eye movements were full without nystagmus. There was no facial weakness. There was no facial sensory loss to pinprick bilaterally. Hearing was normal for light finger rub and casual speech. Speech is normal with trace  dysarthria. The neck is supple and strength is normal in rotation, flexion and extension. Tongue and palate movements are normal. He has had eyelid surgery for ptosis recently      MOTOR UPPER EXTREMTIES: Bilaterally the deltoid, biceps, triceps, wrist extensors, intrinsic hand muscles, and  were grade 5 and normal. Bulk, tone and strength of these muscles were normal without abnormal movements.      MOTOR LOWER EXTREMITIES: Bilaterally the hip flexors, hip abductors, knee flexors and extensors, ankle plantar flexors and dorsiflexors were grade 5 with normal.  Bulk, tone and strength of these muscles were normal without abnormal movements.  DEEP TENDON REFLEXES: Bilateral biceps, triceps, and brachioradialis reflexes were grade 1 symmetric. Bilateral knee, hamstrings and ankle reflexes were grade 1 and symmetric. The plantar responses were downgoing. Ankle clonus was not present.      CEREBELLAR: Finger to nose, rapid finger opposition, and heel-to-shin tests were performed normally, bilaterally.      MUSCULOSKELETAL: Normal range of motion of the neck is noted. There was no back pain with straight leg raise. Internal and external rotation of the hips was normal.       SENSORY: There was normal upper and lower extremity sensation to vibration, proprioception, and pinprick.      HIGHER CORTICAL FUNCTION: There were no aphasic or apraxic errors. Visual fields were intact to confrontation.  Physical Exam    Assessment/Plan:     Calos was seen today for dementia.    Diagnoses and all orders for this visit:    Frontotemporal dementia       The patient has had some decline in his memory since his last visit as evident by his repeat neuropsychology testing. He and his wife however feel he has been doing fairly well. I will have him continue Exelon 13.3 mg/24 hours. Discussed the importance of remaining cognitively, physically, and socially active for best outcomes. Again encouraged him to refrain from alcohol.     F/u 6 months.

## 2018-01-30 ENCOUNTER — TELEPHONE (OUTPATIENT)
Dept: SLEEP MEDICINE | Facility: HOSPITAL | Age: 78
End: 2018-01-30

## 2018-01-30 NOTE — TELEPHONE ENCOUNTER
Tech called pt on home & mobile #. No answer on either #. Tech left vm informing pt to return call to schedule F/U appt to discuss results and treatment options. MAB

## 2018-01-31 ENCOUNTER — TELEPHONE (OUTPATIENT)
Dept: SLEEP MEDICINE | Facility: HOSPITAL | Age: 78
End: 2018-01-31

## 2018-01-31 NOTE — TELEPHONE ENCOUNTER
Tech called mobile #, pt spouse answered and stated they would call back as they were in the car driving. Tech gave pt office #. MAB

## 2018-02-08 ENCOUNTER — OFFICE VISIT (OUTPATIENT)
Dept: SLEEP MEDICINE | Facility: HOSPITAL | Age: 78
End: 2018-02-08
Attending: INTERNAL MEDICINE

## 2018-02-08 DIAGNOSIS — J11.1 INFLUENZA-LIKE ILLNESS: ICD-10-CM

## 2018-02-08 DIAGNOSIS — G47.61 PLMD (PERIODIC LIMB MOVEMENT DISORDER): ICD-10-CM

## 2018-02-08 DIAGNOSIS — G47.33 OSA (OBSTRUCTIVE SLEEP APNEA): Primary | ICD-10-CM

## 2018-02-08 PROCEDURE — G0463 HOSPITAL OUTPT CLINIC VISIT: HCPCS

## 2018-02-08 PROCEDURE — 99214 OFFICE O/P EST MOD 30 MIN: CPT | Performed by: INTERNAL MEDICINE

## 2018-02-08 RX ORDER — PRAMIPEXOLE DIHYDROCHLORIDE 0.25 MG/1
0.5 TABLET ORAL NIGHTLY
Qty: 60 TABLET | Refills: 3 | Status: SHIPPED | OUTPATIENT
Start: 2018-02-08 | End: 2018-02-28

## 2018-02-08 RX ORDER — OSELTAMIVIR PHOSPHATE 75 MG/1
75 CAPSULE ORAL 2 TIMES DAILY
Qty: 10 CAPSULE | Refills: 0 | Status: SHIPPED | OUTPATIENT
Start: 2018-02-08 | End: 2018-02-28

## 2018-02-08 NOTE — PROGRESS NOTES
Follow Up Sleep Disorders Center Note       Patient Care Team:  Karlie Hart MD as PCP - General (Internal Medicine)  Hayden Santiago MD as Neurologist (Neurology)  Julian Dahl MD as Consulting Physician (Sleep Medicine)    Chief Complaint:  FIORELLA     Interval History:   The patient was last seen by me in December.  January 20, 2018, the patient had an overnight polysomnogram.  Mild obstructive sleep apnea for total sleep time identified; AHI 6.4 events per hour.  This was mainly in the supine position with AHI of 23 per hour and during REM with AHI 27 per hour.  On his left side he was normal.  No sleep-related hypoxemia.  Evidence of periodic limb movement disorder noted.  The patient is here today for follow-up.    He is unchanged.  He goes to bed at midnight and awakens at 6 AM.  He gets up once for the bathroom.  He continues to use O2 2 L at at bedtime.  [place] Sleepiness Scale is normal at 3.    Since yesterday, the patient has had signs and symptoms consistent with L4 with that.  He's had a low-grade fever.  He has head congestion.  He describes aches and malaise.    Review of Systems:  Recorded on the Sleep Questionnaire.  Unremarkable except for that listed above and atypical chest pain.    He reports that his past medical history and past family history is unchanged.    Social History:  He will have 3 caffeinated beverages a day.  Social History     Social History   • Marital status:      Spouse name: N/A   • Number of children: 1   • Years of education: N/A     Occupational History   • Healthcare Executive      Social History Main Topics   • Smoking status: Former Smoker     Types: Cigarettes     Quit date: 1973   • Smokeless tobacco: Not on file   • Alcohol use No   • Drug use: No   • Sexual activity: Not on file     Other Topics Concern   • Not on file     Social History Narrative       Allergies:  Beta adrenergic blockers and Sulfa antibiotics     Medication Review:  Reviewed.       Vital Signs:  Height 69.5 inches and weight 175 and he is overweight with a body mass index of 25-26.    Physical Exam:    Constitutional:  Well developed white male and appears in no apparent distress.  Awake & oriented times 3.  Normal mood with normal recent and remote memory and normal judgement.  Eyes:  Conjunctivae normal.  Oropharynx:  moist mucous membranes without exudate and a normal sized tongue and uvula and moderate-severe narrowing of the posterior pharyngeal opening and he has an overbite.  No signs of acute infection noted.  Please see the sleep disorder Center physical exam form for further details.      Results Review:  I reviewed the results of his overnight polysomnogram with him and his wife.       Impression:   Obstructive sleep apnea by overnight polysomnogram January 20, 2018.  Mild obstructive sleep apnea for total sleep time; mainly moderate severity when supine and during REM.  On his left side he is normal.  There was also evidence for periodic limb movement disorder.  No sleep-related hypoxemia.      Plan:  Good sleep hygiene measures should be maintained.  Weight loss would be beneficial in this patient who is obese by BMI.       After reviewing all with the patient and his wife, I would recommend that the patient be evaluated for an oral appliance.  He states he could except this more than excepting positive airway pressure therapy.  The patient will call the [facility].  If the patient obtained an oral appliance, follow-up home sleep study or an attended study should be performed.    Since he overnight polysomnogram demonstrated evidence of periodic limb movement disorder, Mirapex 0.25, 1 by mouth daily at bedtime ×5 days and increasing it to 2 daily at bedtime if he is no better, prescribed.    After reviewing with the patient and his wife, he appears to be developing symptoms consistent with influenza.  I did prescribe him Tamiflu for 5 days.    The  patient will call for any problems and will follow up in 4 months.      Julian Dahl MD  02/08/18  11:59 AM

## 2018-02-10 PROBLEM — G47.61 PLMD (PERIODIC LIMB MOVEMENT DISORDER): Status: ACTIVE | Noted: 2018-02-10

## 2018-02-10 PROBLEM — J11.1 INFLUENZA-LIKE ILLNESS: Status: ACTIVE | Noted: 2018-02-10

## 2018-02-12 ENCOUNTER — TELEPHONE (OUTPATIENT)
Dept: NEUROLOGY | Facility: CLINIC | Age: 78
End: 2018-02-12

## 2018-02-12 NOTE — TELEPHONE ENCOUNTER
----- Message from Phyllisbryanna Benedictris sent at 2/12/2018  1:21 PM EST -----  Contact: [phone]  Patient had a question about a new medication Mirapex that  wants to put him on. Dr. Dahl also did patients sleep study, the wife stated that this was one reason that patient was prescribed this medication. Wife wants to know how you feel about patient being on this medication due to the side effects it causes. She would also like for you to look at patients sleep study, even though  has already gone over the results with them.

## 2018-02-13 NOTE — TELEPHONE ENCOUNTER
Let them know I reviewed the sleep study as well as Dr. Dahl's notes. I am okay with using Mirapex as well, but let them know just to keep an eye out for side effects, specifically impulsive behaviors.

## 2018-02-21 DIAGNOSIS — Z85.51 HISTORY OF BLADDER CANCER: Primary | ICD-10-CM

## 2018-02-21 DIAGNOSIS — E11.9 TYPE 2 DIABETES MELLITUS WITHOUT COMPLICATION, UNSPECIFIED LONG TERM INSULIN USE STATUS: ICD-10-CM

## 2018-02-21 DIAGNOSIS — E78.2 MIXED HYPERLIPIDEMIA: ICD-10-CM

## 2018-02-21 DIAGNOSIS — I10 ESSENTIAL HYPERTENSION: ICD-10-CM

## 2018-02-22 ENCOUNTER — TELEPHONE (OUTPATIENT)
Dept: NEUROLOGY | Facility: CLINIC | Age: 78
End: 2018-02-22

## 2018-02-22 LAB
ALBUMIN SERPL-MCNC: 4.2 G/DL (ref 3.5–5.2)
ALBUMIN/CREAT UR: 38.2 (ref 0–30)
ALBUMIN/GLOB SERPL: 1.8 G/DL
ALP SERPL-CCNC: 50 U/L (ref 39–117)
ALT SERPL-CCNC: 16 U/L (ref 1–41)
APPEARANCE UR: CLEAR
AST SERPL-CCNC: 14 U/L (ref 1–40)
BACTERIA #/AREA URNS HPF: NORMAL /HPF
BASOPHILS # BLD AUTO: 0.02 10*3/MM3 (ref 0–0.2)
BASOPHILS NFR BLD AUTO: 0.3 % (ref 0–1.5)
BILIRUB SERPL-MCNC: 0.7 MG/DL (ref 0.1–1.2)
BILIRUB UR QL STRIP: NEGATIVE
BUN SERPL-MCNC: 20 MG/DL (ref 8–23)
BUN/CREAT SERPL: 24.1 (ref 7–25)
CALCIUM SERPL-MCNC: 9.4 MG/DL (ref 8.6–10.5)
CASTS URNS MICRO: NORMAL
CHLORIDE SERPL-SCNC: 98 MMOL/L (ref 98–107)
CHOLEST SERPL-MCNC: 176 MG/DL (ref 0–200)
CO2 SERPL-SCNC: 32.4 MMOL/L (ref 22–29)
COLOR UR: YELLOW
CREAT SERPL-MCNC: 0.83 MG/DL (ref 0.76–1.27)
CREAT UR-MCNC: 91.4 MG/DL
EOSINOPHIL # BLD AUTO: 0.14 10*3/MM3 (ref 0–0.7)
EOSINOPHIL NFR BLD AUTO: 1.9 % (ref 0.3–6.2)
EPI CELLS #/AREA URNS HPF: NORMAL /HPF
ERYTHROCYTE [DISTWIDTH] IN BLOOD BY AUTOMATED COUNT: 13.1 % (ref 11.5–14.5)
GFR SERPLBLD CREATININE-BSD FMLA CKD-EPI: 108 ML/MIN/1.73
GFR SERPLBLD CREATININE-BSD FMLA CKD-EPI: 89 ML/MIN/1.73
GLOBULIN SER CALC-MCNC: 2.4 GM/DL
GLUCOSE SERPL-MCNC: 122 MG/DL (ref 65–99)
GLUCOSE UR QL: NEGATIVE
HBA1C MFR BLD: 6.2 % (ref 4.8–5.6)
HCT VFR BLD AUTO: 47.1 % (ref 40.4–52.2)
HDLC SERPL-MCNC: 48 MG/DL (ref 40–60)
HGB BLD-MCNC: 15.7 G/DL (ref 13.7–17.6)
HGB UR QL STRIP: NEGATIVE
IMM GRANULOCYTES # BLD: 0 10*3/MM3 (ref 0–0.03)
IMM GRANULOCYTES NFR BLD: 0 % (ref 0–0.5)
KETONES UR QL STRIP: NEGATIVE
LDLC SERPL CALC-MCNC: 113 MG/DL (ref 0–100)
LEUKOCYTE ESTERASE UR QL STRIP: NEGATIVE
LYMPHOCYTES # BLD AUTO: 1.63 10*3/MM3 (ref 0.9–4.8)
LYMPHOCYTES NFR BLD AUTO: 21.7 % (ref 19.6–45.3)
MCH RBC QN AUTO: 30.7 PG (ref 27–32.7)
MCHC RBC AUTO-ENTMCNC: 33.3 G/DL (ref 32.6–36.4)
MCV RBC AUTO: 92.2 FL (ref 79.8–96.2)
MICROALBUMIN UR-MCNC: 34.9 UG/ML
MONOCYTES # BLD AUTO: 0.68 10*3/MM3 (ref 0.2–1.2)
MONOCYTES NFR BLD AUTO: 9.1 % (ref 5–12)
NEUTROPHILS # BLD AUTO: 5.04 10*3/MM3 (ref 1.9–8.1)
NEUTROPHILS NFR BLD AUTO: 67 % (ref 42.7–76)
NITRITE UR QL STRIP: NEGATIVE
PH UR STRIP: 6.5 [PH] (ref 5–8)
PLATELET # BLD AUTO: 269 10*3/MM3 (ref 140–500)
POTASSIUM SERPL-SCNC: 4.1 MMOL/L (ref 3.5–5.2)
PROT SERPL-MCNC: 6.6 G/DL (ref 6–8.5)
PROT UR QL STRIP: NEGATIVE
RBC # BLD AUTO: 5.11 10*6/MM3 (ref 4.6–6)
RBC #/AREA URNS HPF: NORMAL /HPF
SODIUM SERPL-SCNC: 140 MMOL/L (ref 136–145)
SP GR UR: 1.02 (ref 1–1.03)
TRIGL SERPL-MCNC: 73 MG/DL (ref 0–150)
TSH SERPL DL<=0.005 MIU/L-ACNC: 1.24 MIU/ML (ref 0.27–4.2)
UROBILINOGEN UR STRIP-MCNC: (no result) MG/DL
VLDLC SERPL CALC-MCNC: 14.6 MG/DL (ref 5–40)
WBC # BLD AUTO: 7.51 10*3/MM3 (ref 4.5–10.7)
WBC #/AREA URNS HPF: NORMAL /HPF

## 2018-02-22 NOTE — TELEPHONE ENCOUNTER
----- Message from Davon Vargas sent at 2/21/2018 10:46 AM EST -----  Contact: [phone]  Pts wife stated that Presley Faye has been calling them but her cell phone is not ringing. She stated it was about a medication and she would like her to call her home number [phone]

## 2018-02-28 ENCOUNTER — OFFICE VISIT (OUTPATIENT)
Dept: INTERNAL MEDICINE | Facility: CLINIC | Age: 78
End: 2018-02-28

## 2018-02-28 VITALS
SYSTOLIC BLOOD PRESSURE: 110 MMHG | HEIGHT: 70 IN | BODY MASS INDEX: 24.34 KG/M2 | DIASTOLIC BLOOD PRESSURE: 70 MMHG | OXYGEN SATURATION: 94 % | WEIGHT: 170 LBS | HEART RATE: 60 BPM

## 2018-02-28 DIAGNOSIS — E11.9 TYPE 2 DIABETES MELLITUS WITHOUT COMPLICATION, UNSPECIFIED LONG TERM INSULIN USE STATUS: ICD-10-CM

## 2018-02-28 DIAGNOSIS — Z00.00 WELL ADULT EXAM: ICD-10-CM

## 2018-02-28 DIAGNOSIS — E78.2 MIXED HYPERLIPIDEMIA: ICD-10-CM

## 2018-02-28 DIAGNOSIS — Z00.00 MEDICARE ANNUAL WELLNESS VISIT, SUBSEQUENT: Primary | ICD-10-CM

## 2018-02-28 DIAGNOSIS — Z13.6 ENCOUNTER FOR SCREENING FOR VASCULAR DISEASE: ICD-10-CM

## 2018-02-28 DIAGNOSIS — I10 ESSENTIAL HYPERTENSION: ICD-10-CM

## 2018-02-28 PROBLEM — J11.1 INFLUENZA-LIKE ILLNESS: Status: RESOLVED | Noted: 2018-02-10 | Resolved: 2018-02-28

## 2018-02-28 PROCEDURE — 90732 PPSV23 VACC 2 YRS+ SUBQ/IM: CPT | Performed by: INTERNAL MEDICINE

## 2018-02-28 PROCEDURE — G0009 ADMIN PNEUMOCOCCAL VACCINE: HCPCS | Performed by: INTERNAL MEDICINE

## 2018-02-28 PROCEDURE — 99397 PER PM REEVAL EST PAT 65+ YR: CPT | Performed by: INTERNAL MEDICINE

## 2018-02-28 PROCEDURE — 96160 PT-FOCUSED HLTH RISK ASSMT: CPT | Performed by: INTERNAL MEDICINE

## 2018-02-28 PROCEDURE — G0439 PPPS, SUBSEQ VISIT: HCPCS | Performed by: INTERNAL MEDICINE

## 2018-02-28 RX ORDER — PRAMIPEXOLE DIHYDROCHLORIDE 0.25 MG/1
0.5 TABLET ORAL NIGHTLY
Qty: 60 TABLET | Refills: 3 | COMMUNITY
Start: 2018-02-28 | End: 2019-11-25 | Stop reason: SDUPTHER

## 2018-02-28 NOTE — PATIENT INSTRUCTIONS
Medicare Wellness  Personal Prevention Plan of Service     Date of Office Visit:  2018  Encounter Provider:  Karlie Hart MD  Place of Service:  [facility] INTERNAL MEDIC[facility]  Patient Name: Calos Lam  :  1940    As part of the Medicare Wellness portion of your visit today, we are providing you with this personalized preventive plan of services (PPPS). This plan is based upon recommendations of the [place] Preventive Services Task Force (USPSTF) and the Advisory Committee on Immunization Practices (ACIP).    This lists the preventive care services that should be considered, and provides dates of when you are due. Items listed as completed are up-to-date and do not require any further intervention.    Health Maintenance   Topic Date Due   • TDAP/TD VACCINES (1 - Tdap) 2014   • MEDICARE ANNUAL WELLNESS  02/15/2018   • PNEUMOCOCCAL VACCINES (65+ LOW/MEDIUM RISK) (2 of 2 - PPSV23) 02/15/2018   • DIABETIC EYE EXAM  2018   • HEMOGLOBIN A1C  2018   • LIPID PANEL  2019   • URINE MICROALBUMIN  2019   • DIABETIC FOOT EXAM  2019   • COLONOSCOPY  2022   • INFLUENZA VACCINE  Completed   • ZOSTER VACCINE  Completed       Orders Placed This Encounter   Procedures   • Pneumococcal Polysaccharide Vaccine 23-Valent Greater Than or Equal To 3yo Subcutaneous / IM       No Follow-up on file.

## 2018-02-28 NOTE — PROGRESS NOTES
Subjective     Calos Lam is a 77 y.o. male who presents for an annual wellness visit/cpe as well as check up of dm-2, htn, hld.        History of Present Illness     Dm-2.  Good BS control.     HTN.  Control is excellent.   HLD.  He is maintained on pravastatin with good control.    Review of Systems   Respiratory: Negative.    Cardiovascular: Negative.        The following portions of the patient's history were reviewed and updated as appropriate: allergies, current medications, past family history, past medical history, past social history, past surgical history and problem list.  Health maintenance tab was reviewed and updated with the patient.       Patient Active Problem List    Diagnosis Date Noted   • PLMD (periodic limb movement disorder) 02/10/2018   • Colon polyp 07/25/2017     Note Last Updated: 8/31/2017     Overview:   Added automatically from request for surgery [id number]     • History of bladder cancer 02/18/2017   • RLS (restless legs syndrome) 02/18/2017   • Type 2 diabetes mellitus without complication 02/15/2017   • Essential hypertension    • Mixed hyperlipidemia    • Frontotemporal dementia 01/18/2017   • FIORELLA (obstructive sleep apnea) intolerent CPAP 12/11/2016   • Sleep-related hypoventilation/hypoxia treated with O2 2L NC at HS 12/11/2016       Past Medical History:   Diagnosis Date   • Essential hypertension    • GERD (gastroesophageal reflux disease)    • Impacted cerumen    • Impotence of organic origin    • Malaise and fatigue    • Mantoux: positive    • Memory loss    • Mixed hyperlipidemia    • Organic sleep apnea    • Pain in thoracic spine    • Primary malignant neoplasm of bladder 09/2009   • Restless leg syndrome, uncontrolled    • Testicular hypofunction    • Vertigo    • Vitamin D deficiency        Past Surgical History:   Procedure Laterality Date   • APPENDECTOMY  1951   • COLON RESECTION LEFT Left 2005   • COLONOSCOPY  07/31/2012    normal   • EYE SURGERY  2014    lasik.   [place]   • CONSUELOVIRAL Bilateral 2003   • ROOT CANAL     • TURP / TRANSURETHRAL INCISION / DRAINAGE PROSTATE  1980       Family History   Problem Relation Age of Onset   • Diabetes Sister    • COPD Sister    • Diabetes Brother    • Heart disease Brother    • Hypertension Brother    • Dementia Maternal Grandmother    • Alcohol abuse Neg Hx        Social History     Social History   • Marital status:      Spouse name: N/A   • Number of children: 1   • Years of education: N/A     Occupational History   • Healthcare Executive      Social History Main Topics   • Smoking status: Former Smoker     Types: Cigarettes     Quit date: 1973   • Smokeless tobacco: Never Used   • Alcohol use No   • Drug use: No   • Sexual activity: Not on file     Other Topics Concern   • Not on file     Social History Narrative       Current Outpatient Prescriptions on File Prior to Visit   Medication Sig Dispense Refill   • amLODIPine (NORVASC) 10 MG tablet Take 1 tablet by mouth Daily. 90 tablet 3   • ASPIRIN 81 PO Take  by mouth Daily.     • BYSTOLIC 10 MG tablet Take 1 tablet by mouth Daily. 90 tablet 3   • pravastatin (PRAVACHOL) 40 MG tablet Take 1 tablet by mouth Daily. 90 tablet 3   • rivastigmine (EXELON) 13.3 MG/24HR patch Place 1 patch on the skin Daily. 90 patch 1   • tamsulosin (FLOMAX) 0.4 MG capsule 24 hr capsule Take 1 capsule by mouth Daily.     • valsartan-hydrochlorothiazide (DIOVAN-HCT) 160-12.5 MG per tablet Take 1 tablet by mouth Daily. 90 tablet 3   • vitamin D (ERGOCALCIFEROL) 65599 units capsule capsule Take 1 capsule by mouth 1 (One) Time Per Week. 60 capsule 3   • [DISCONTINUED] oseltamivir (TAMIFLU) 75 MG capsule Take 1 capsule by mouth 2 (Two) Times a Day. 10 capsule 0   • [DISCONTINUED] pramipexole (MIRAPEX) 0.25 MG tablet Take 2 tablets by mouth Every Night. 60 tablet 3     No current facility-administered medications on file prior to visit.        Allergies   Allergen Reactions   • Beta Adrenergic Blockers    •  "Sulfa Antibiotics        Immunization History   Administered Date(s) Administered   • Flu Vaccine High Dose PF 65YR+ 10/13/2017   • Pneumococcal Conjugate 13-Valent (PCV13) 02/15/2017   • Td, Not Adsorbed 05/04/2014   • Zoster 01/01/2013       Objective     /70  Pulse 60  Ht 177.8 cm (70\")  Wt 77.1 kg (170 lb)  SpO2 94%  BMI 24.39 kg/m2    Physical Exam   Constitutional: He is oriented to person, place, and time. He appears well-developed and well-nourished.   HENT:   Head: Normocephalic and atraumatic.   Right Ear: Hearing and tympanic membrane normal.   Left Ear: Hearing and tympanic membrane normal.   Mouth/Throat: No posterior oropharyngeal erythema.   Neck: Neck supple. No thyromegaly present.   Cardiovascular: Normal rate, regular rhythm and normal heart sounds.    No murmur heard.  Pulmonary/Chest: Effort normal and breath sounds normal.   Abdominal: Soft. He exhibits no distension. There is no hepatosplenomegaly. There is no tenderness.   Lymphadenopathy:     He has no cervical adenopathy.   Neurological: He is alert and oriented to person, place, and time.   Skin: Skin is warm and dry.   Psychiatric: He has a normal mood and affect. His speech is normal and behavior is normal. Judgment and thought content normal. Cognition and memory are normal.       Assessment/Plan   Calos was seen today for medicare wellness visit.    Diagnoses and all orders for this visit:    Medicare annual wellness visit, subsequent    Encounter for screening for vascular disease  -     Vascular Screening (Bundle) CAR; Future    Well adult exam    Type 2 diabetes mellitus without complication, unspecified long term insulin use status    Essential hypertension    Mixed hyperlipidemia    Other orders  -     Pneumococcal Polysaccharide Vaccine 23-Valent Greater Than or Equal To 3yo Subcutaneous / IM        Discussion    AWV.  See scanned forms for carrol history, PHQ-9, functional ability questionnaire, cognitive impairment " screening.  Direct observation of cognitive abilities:  The patient does not exhibit  any impairment in cognitive abilities upon direct observation at today's visit.   These were all reviewed with the patient and the patient was provided with a personal prevention plan of service in patient instructions.  Patient was given advice or handouts on the following topics:  nutrition, exercise   DM-2. Continue healthy diet.    HTN. Continue current regimen.   HLD.  Continue pravastatin.      Health Maintenance   Topic Date Due   • TDAP/TD VACCINES (1 - Tdap) 05/05/2014   • MEDICARE ANNUAL WELLNESS  02/15/2018   • PNEUMOCOCCAL VACCINES (65+ LOW/MEDIUM RISK) (2 of 2 - PPSV23) 02/15/2018   • DIABETIC EYE EXAM  04/05/2018   • HEMOGLOBIN A1C  08/21/2018   • LIPID PANEL  02/21/2019   • URINE MICROALBUMIN  02/21/2019   • DIABETIC FOOT EXAM  02/28/2019   • COLONOSCOPY  08/08/2022   • INFLUENZA VACCINE  Completed   • ZOSTER VACCINE  Completed            Future Appointments  Date Time Provider Department Center   6/6/2018 11:30 AM Julian Dahl MD [facility] None   7/26/2018 11:30 AM ERIKA De La Rosa [facility] None   8/22/2018 9:00 AM [facility] None   8/29/2018 3:00 PM Karlie Hart MD [facility] None   12/5/2018 11:30 AM Julian Dahl MD [facility] None

## 2018-03-09 ENCOUNTER — OFFICE VISIT (OUTPATIENT)
Dept: INTERNAL MEDICINE | Facility: CLINIC | Age: 78
End: 2018-03-09

## 2018-03-09 VITALS
OXYGEN SATURATION: 98 % | SYSTOLIC BLOOD PRESSURE: 112 MMHG | BODY MASS INDEX: 24.39 KG/M2 | WEIGHT: 170 LBS | DIASTOLIC BLOOD PRESSURE: 60 MMHG | HEART RATE: 59 BPM

## 2018-03-09 DIAGNOSIS — M25.512 PAIN IN JOINT OF LEFT SHOULDER: Primary | ICD-10-CM

## 2018-03-09 DIAGNOSIS — M25.522 LEFT ELBOW PAIN: ICD-10-CM

## 2018-03-09 PROCEDURE — 99213 OFFICE O/P EST LOW 20 MIN: CPT | Performed by: INTERNAL MEDICINE

## 2018-03-09 NOTE — PROGRESS NOTES
Subjective     Calos Lam is a 77 y.o. male who presents with   Chief Complaint   Patient presents with   • Arm Pain     Pneumovax reaction       History of Present Illness     Pain in shoulder and elbow after pneumovax shot.  Pain is improving.  He describes as a dull ache.    Review of Systems   Constitutional: Negative for fever.   Skin: Negative for rash.       The following portions of the patient's history were reviewed and updated as appropriate: allergies, current medications and problem list.    Patient Active Problem List    Diagnosis Date Noted   • PLMD (periodic limb movement disorder) 02/10/2018   • Colon polyp 07/25/2017     Note Last Updated: 8/31/2017     Overview:   Added automatically from request for surgery [id number]     • History of bladder cancer 02/18/2017   • RLS (restless legs syndrome) 02/18/2017   • Type 2 diabetes mellitus without complication 02/15/2017   • Essential hypertension    • Mixed hyperlipidemia    • Frontotemporal dementia 01/18/2017   • FIORELLA (obstructive sleep apnea) intolerent CPAP 12/11/2016   • Sleep-related hypoventilation/hypoxia treated with O2 2L NC at HS 12/11/2016       Current Outpatient Prescriptions on File Prior to Visit   Medication Sig Dispense Refill   • amLODIPine (NORVASC) 10 MG tablet Take 1 tablet by mouth Daily. 90 tablet 3   • ASPIRIN 81 PO Take  by mouth Daily.     • BYSTOLIC 10 MG tablet Take 1 tablet by mouth Daily. 90 tablet 3   • pramipexole (MIRAPEX) 0.25 MG tablet Take 2 tablets by mouth Every Night. 60 tablet 3   • pravastatin (PRAVACHOL) 40 MG tablet Take 1 tablet by mouth Daily. 90 tablet 3   • rivastigmine (EXELON) 13.3 MG/24HR patch Place 1 patch on the skin Daily. 90 patch 1   • tamsulosin (FLOMAX) 0.4 MG capsule 24 hr capsule Take 1 capsule by mouth Daily.     • valsartan-hydrochlorothiazide (DIOVAN-HCT) 160-12.5 MG per tablet Take 1 tablet by mouth Daily. 90 tablet 3   • vitamin D (ERGOCALCIFEROL) 90890 units capsule capsule Take 1  capsule by mouth 1 (One) Time Per Week. 60 capsule 3     No current facility-administered medications on file prior to visit.        Objective     /60  Pulse 59  Wt 77.1 kg (170 lb)  SpO2 98%  BMI 24.39 kg/m2    Physical Exam   Constitutional: He is oriented to person, place, and time. He appears well-developed and well-nourished.   HENT:   Head: Atraumatic.   Musculoskeletal:        Left shoulder: He exhibits normal range of motion, no tenderness, no bony tenderness, no swelling, no effusion, no crepitus and no deformity.        Left elbow: He exhibits normal range of motion, no swelling, no effusion and no deformity.   Neurological: He is alert and oriented to person, place, and time.   Psychiatric: He has a normal mood and affect.       Assessment/Plan   Calos was seen today for arm pain.    Diagnoses and all orders for this visit:    Pain in joint of left shoulder    Left elbow pain        Discussion  Patient present with left shoulder pain and left elbow after pneumovax immunization.  Arthralgias is listed as a common reaction to pneumovax.  He is improving.  Trial of conservative management with tylenol.  Let me know if not continuing to feel better over the next several days.         Future Appointments  Date Time Provider Department Center   3/19/2018 12:30 PM  [facility]   6/6/2018 11:30 AM MD BHUMIKA [place] [facility] None   7/26/2018 11:30 AM ERIKA De La Rosa [facility] None   8/22/2018 9:00 AM [facility] None   8/29/2018 3:00 PM MD BHUMIKA Johnson [facility] None   12/5/2018 11:30 AM Julian Dahl MD [facility] None

## 2018-03-16 RX ORDER — RIVASTIGMINE 13.3 MG/24H
1 PATCH, EXTENDED RELEASE TRANSDERMAL DAILY
Qty: 90 PATCH | Refills: 1 | Status: SHIPPED | OUTPATIENT
Start: 2018-03-16 | End: 2018-10-25 | Stop reason: SDUPTHER

## 2018-03-19 ENCOUNTER — HOSPITAL ENCOUNTER (OUTPATIENT)
Dept: CARDIOLOGY | Facility: HOSPITAL | Age: 78
Discharge: HOME | End: 2018-03-19
Admitting: INTERNAL MEDICINE

## 2018-03-19 VITALS
SYSTOLIC BLOOD PRESSURE: 153 MMHG | BODY MASS INDEX: 24.88 KG/M2 | HEART RATE: 52 BPM | DIASTOLIC BLOOD PRESSURE: 78 MMHG | WEIGHT: 168 LBS | HEIGHT: 69 IN

## 2018-03-19 DIAGNOSIS — Z13.6 ENCOUNTER FOR SCREENING FOR VASCULAR DISEASE: ICD-10-CM

## 2018-03-19 LAB
BH CV ECHO MEAS - DIST AO DIAM: 1.87 CM
BH CV VAS BP LEFT ARM: NORMAL MMHG
BH CV VAS BP RIGHT ARM: NORMAL MMHG
BH CV XLRA MEAS - MID AO DIAM: 2.34 CM
BH CV XLRA MEAS - PAD LEFT ABI DP: 1.08
BH CV XLRA MEAS - PAD LEFT ABI PT: 1.07
BH CV XLRA MEAS - PAD LEFT ARM: 153 MMHG
BH CV XLRA MEAS - PAD LEFT LEG DP: 166 MMHG
BH CV XLRA MEAS - PAD LEFT LEG PT: 164 MMHG
BH CV XLRA MEAS - PAD RIGHT ABI DP: 1.05
BH CV XLRA MEAS - PAD RIGHT ABI PT: 1.04
BH CV XLRA MEAS - PAD RIGHT ARM: 149 MMHG
BH CV XLRA MEAS - PAD RIGHT LEG DP: 162 MMHG
BH CV XLRA MEAS - PAD RIGHT LEG PT: 160 MMHG
BH CV XLRA MEAS - PROX AO DIAM: 1.92 CM
BH CV XLRA MEAS LEFT ICA/CCA RATIO: 2.34
BH CV XLRA MEAS LEFT MID CCA PSV: NORMAL CM/SEC
BH CV XLRA MEAS LEFT MID ICA PSV: NORMAL CM/SEC
BH CV XLRA MEAS LEFT PROX ECA PSV: NORMAL CM/SEC
BH CV XLRA MEAS RIGHT ICA/CCA RATIO: 1.92
BH CV XLRA MEAS RIGHT MID CCA PSV: NORMAL CM/SEC
BH CV XLRA MEAS RIGHT MID ICA PSV: NORMAL CM/SEC
BH CV XLRA MEAS RIGHT PROX ECA PSV: 1.87 CM/SEC

## 2018-03-19 PROCEDURE — 93799 UNLISTED CV SVC/PROCEDURE: CPT

## 2018-04-28 ENCOUNTER — APPOINTMENT (OUTPATIENT)
Dept: CT IMAGING | Facility: HOSPITAL | Age: 78
End: 2018-04-28

## 2018-04-28 ENCOUNTER — HOSPITAL ENCOUNTER (EMERGENCY)
Facility: HOSPITAL | Age: 78
Discharge: HOME | End: 2018-04-28
Attending: EMERGENCY MEDICINE | Admitting: EMERGENCY MEDICINE

## 2018-04-28 VITALS
TEMPERATURE: 96.5 F | SYSTOLIC BLOOD PRESSURE: 143 MMHG | BODY MASS INDEX: 23.66 KG/M2 | RESPIRATION RATE: 16 BRPM | HEIGHT: 71 IN | WEIGHT: 169 LBS | HEART RATE: 54 BPM | DIASTOLIC BLOOD PRESSURE: 66 MMHG | OXYGEN SATURATION: 95 %

## 2018-04-28 DIAGNOSIS — S00.83XA CONTUSION OF FOREHEAD, INITIAL ENCOUNTER: ICD-10-CM

## 2018-04-28 DIAGNOSIS — S01.81XA LACERATION OF FOREHEAD, INITIAL ENCOUNTER: Primary | ICD-10-CM

## 2018-04-28 PROCEDURE — 99283 EMERGENCY DEPT VISIT LOW MDM: CPT

## 2018-04-28 PROCEDURE — 70450 CT HEAD/BRAIN W/O DYE: CPT

## 2018-04-28 RX ORDER — LIDOCAINE HYDROCHLORIDE AND EPINEPHRINE 10; 10 MG/ML; UG/ML
10 INJECTION, SOLUTION INFILTRATION; PERINEURAL ONCE
Status: COMPLETED | OUTPATIENT
Start: 2018-04-28 | End: 2018-04-28

## 2018-04-28 RX ADMIN — LIDOCAINE HYDROCHLORIDE AND EPINEPHRINE 10 ML: 10; 10 INJECTION, SOLUTION INFILTRATION; PERINEURAL at 16:12

## 2018-04-30 ENCOUNTER — TELEPHONE (OUTPATIENT)
Dept: SOCIAL WORK | Facility: HOSPITAL | Age: 78
End: 2018-04-30

## 2018-05-04 ENCOUNTER — OFFICE VISIT (OUTPATIENT)
Dept: INTERNAL MEDICINE | Facility: CLINIC | Age: 78
End: 2018-05-04

## 2018-05-04 VITALS
SYSTOLIC BLOOD PRESSURE: 120 MMHG | HEART RATE: 47 BPM | WEIGHT: 170 LBS | BODY MASS INDEX: 24.05 KG/M2 | OXYGEN SATURATION: 95 % | DIASTOLIC BLOOD PRESSURE: 70 MMHG

## 2018-05-04 DIAGNOSIS — Z48.02 VISIT FOR SUTURE REMOVAL: Primary | ICD-10-CM
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
## 2018-05-04 PROCEDURE — 99213 OFFICE O/P EST LOW 20 MIN: CPT | Performed by: INTERNAL MEDICINE

## 2018-05-04 NOTE — PROGRESS NOTES
Subjective     Calos Lam is a 77 y.o. male who presents with   Chief Complaint   Patient presents with   • Suture / Staple Removal     forhead       History of Present Illness      Patient suffered a trip and fall after finishing the mini marathon.  He struck his head.  First seen at [facility] followed by the ER.  No LOC associated.  Suffered a laceration.  Six stitches placed.     Review of Systems   Respiratory: Negative.    Cardiovascular: Negative.        The following portions of the patient's history were reviewed and updated as appropriate: allergies, current medications and problem list.    Patient Active Problem List    Diagnosis Date Noted   • PLMD (periodic limb movement disorder) 02/10/2018   • Colon polyp 07/25/2017     Note Last Updated: 8/31/2017     Overview:   Added automatically from request for surgery [id number]     • History of bladder cancer 02/18/2017   • RLS (restless legs syndrome) 02/18/2017   • Type 2 diabetes mellitus without complication 02/15/2017   • Essential hypertension    • Mixed hyperlipidemia    • Frontotemporal dementia 01/18/2017   • FIORELLA (obstructive sleep apnea) intolerent CPAP 12/11/2016   • Sleep-related hypoventilation/hypoxia treated with O2 2L NC at HS 12/11/2016       Current Outpatient Prescriptions on File Prior to Visit   Medication Sig Dispense Refill   • amLODIPine (NORVASC) 10 MG tablet Take 1 tablet by mouth Daily. 90 tablet 3   • ASPIRIN 81 PO Take  by mouth Daily.     • BYSTOLIC 10 MG tablet Take 1 tablet by mouth Daily. 90 tablet 3   • pramipexole (MIRAPEX) 0.25 MG tablet Take 2 tablets by mouth Every Night. 60 tablet 3   • pravastatin (PRAVACHOL) 40 MG tablet Take 1 tablet by mouth Daily. 90 tablet 3   • rivastigmine (EXELON) 13.3 MG/24HR patch Place 1 patch on the skin Daily. 90 patch 1   • tamsulosin (FLOMAX) 0.4 MG capsule 24 hr capsule Take 1 capsule by mouth Daily.     • valsartan-hydrochlorothiazide (DIOVAN-HCT) 160-12.5 MG per tablet Take 1 tablet by mouth  Daily. 90 tablet 3   • vitamin D (ERGOCALCIFEROL) 59869 units capsule capsule Take 1 capsule by mouth 1 (One) Time Per Week. 60 capsule 3     No current facility-administered medications on file prior to visit.        Objective     /70   Pulse (!) 47   Wt 77.1 kg (170 lb)   SpO2 95%   BMI 24.05 kg/m²       Physical Exam   Constitutional: He is oriented to person, place, and time. He appears well-developed and well-nourished.   HENT:   Head: Atraumatic.   Neurological: He is alert and oriented to person, place, and time.   Skin:   Six stiches are well-healed.    Psychiatric: He has a normal mood and affect.       Assessment/Plan   Calos was seen today for suture / staple removal.    Diagnoses and all orders for this visit:    Visit for suture removal    Fall, initial encounter    Suture Removal  Date/Time: 5/4/2018 2:41 PM  Performed by: FLAQUITO DOS SANTOS  Authorized by: FLAQUITO DOS SANTOS   Consent: Verbal consent obtained.  Risks and benefits: risks, benefits and alternatives were discussed  Consent given by: patient  Patient understanding: patient states understanding of the procedure being performed  Patient consent: the patient's understanding of the procedure matches consent given  Procedure consent: procedure consent matches procedure scheduled  Relevant documents: relevant documents present and verified  Test results: test results available and properly labeled  Site marked: the operative site was marked  Imaging studies: imaging studies available  Location: forehead.  Wound Appearance: clean  Sutures Removed: 6  Post-removal: dressing applied          Discussion    Patient seen in f/u of fall resulting in laceration.  Six stitches removed without difficulty.  CT of the head is reviewed.  Keep laceration clean and dry with triple antibiotic ointment.        Future Appointments  Date Time Provider Department Center   6/6/2018 11:30 AM Julian Dahl MD [facility] None   7/26/2018 11:30 AM [place]  ERIKA Nash [facility] None   8/22/2018 9:00 AM [facility] None   8/29/2018 3:00 PM Karlie Hart MD [facility] None   12/5/2018 11:30 AM Julian Dahl MD [facility] None

## 2018-06-06 ENCOUNTER — OFFICE VISIT (OUTPATIENT)
Dept: SLEEP MEDICINE | Facility: HOSPITAL | Age: 78
End: 2018-06-06
Attending: INTERNAL MEDICINE

## 2018-06-06 DIAGNOSIS — G47.61 PLMD (PERIODIC LIMB MOVEMENT DISORDER): ICD-10-CM

## 2018-06-06 DIAGNOSIS — G47.33 OSA (OBSTRUCTIVE SLEEP APNEA): Primary | ICD-10-CM

## 2018-06-06 PROCEDURE — 99213 OFFICE O/P EST LOW 20 MIN: CPT | Performed by: INTERNAL MEDICINE

## 2018-06-06 PROCEDURE — G0463 HOSPITAL OUTPT CLINIC VISIT: HCPCS

## 2018-06-06 NOTE — PROGRESS NOTES
Follow Up Sleep Disorders Center Note     Chief Complaint:  FIORELLA     Primary Care Physician: Karlie Hart MD    Interval History:   The patient was last seen by me in February.  That was in follow-up to an overnight polysomnogram performed in January.  For FIORELLA, the patient is completing workup and management of an oral appliance.  He is using his oral appliance only and is not using his oxygen.  He states he is sleeping better.  Additionally, the patient was started on Mirapex for PLMD.  The patient states he is improved.  He goes to bed at midnight and awakens at 6:30 AM.  He uses the restroom once.    Review of Systems:  Recorded on the Sleep Questionnaire.  Unremarkable .    Social History:  3-4 caffeinated beverages a day.  Social History     Social History   • Marital status:    • Number of children: 1     Occupational History   • [employer]      Social History Main Topics   • Smoking status: Former Smoker     Types: Cigarettes     Quit date: 1973   • Smokeless tobacco: Never Used   • Alcohol use No   • Drug use: No     Other Topics Concern   • Not on file       Allergies:  Beta adrenergic blockers and Sulfa antibiotics     Medication Review:  Reviewed.      Vital Signs:  Height 69.5 inches, weight 170 pounds and BMI overweight at 27.    Physical Exam:    Constitutional:  Well developed white male and appears in no apparent distress.  Awake & oriented times 3.  Normal mood with normal recent and remote memory and normal judgement.  Eyes:  Conjunctivae normal.  Oropharynx:  moist mucous membranes without exudate and a normal sized tongue and uvula and moderate-severe narrowing of the posterior pharyngeal opening and he does have an overbite.        Impression:   Obstructive sleep apnea by overnight polysomnogram January 20, 2018.  Mild obstructive sleep apnea for total sleep time; mainly moderate severity when supine and during REM.  On his left side he is normal. Treated with an oral  appliance   There was also evidence for periodic limb movement disorder.  Treated with Mirapex No sleep-related hypoxemia.      Plan:  Good sleep hygiene measures should be maintained.  Some weight loss would be beneficial in this patient who is overweight by BMI.  The patient is benefiting from the treatment being provided.     The patient will continue with his oral appliance.  I will arrange a follow-up home sleep study with oral appliance in place.  The patient will continue Mirapex.    The patient will call for any problems and will follow up after repeat home sleep study performed.        Julian Dahl MD  Sleep Medicine  06/06/18  11:42 AM

## 2018-07-18 ENCOUNTER — HOSPITAL ENCOUNTER (OUTPATIENT)
Dept: SLEEP MEDICINE | Facility: HOSPITAL | Age: 78
Discharge: HOME | End: 2018-07-18
Attending: INTERNAL MEDICINE | Admitting: INTERNAL MEDICINE

## 2018-07-18 DIAGNOSIS — G47.33 OSA (OBSTRUCTIVE SLEEP APNEA): ICD-10-CM

## 2018-07-18 PROCEDURE — 95806 SLEEP STUDY UNATT&RESP EFFT: CPT | Performed by: INTERNAL MEDICINE

## 2018-07-18 PROCEDURE — 95806 SLEEP STUDY UNATT&RESP EFFT: CPT

## 2018-07-23 ENCOUNTER — TELEPHONE (OUTPATIENT)
Dept: SLEEP MEDICINE | Facility: HOSPITAL | Age: 78
End: 2018-07-23

## 2018-07-23 NOTE — TELEPHONE ENCOUNTER
Left patient message with appointment date for follow up to discuss [facility] results.  Pt appt on 8/15/18 at 11:45am. Pt is invited to CB if needing to change date. [email or web address]

## 2018-07-25 ENCOUNTER — TELEPHONE (OUTPATIENT)
Dept: INTERNAL MEDICINE | Facility: CLINIC | Age: 78
End: 2018-07-25

## 2018-07-25 RX ORDER — LOSARTAN POTASSIUM AND HYDROCHLOROTHIAZIDE 12.5; 5 MG/1; MG/1
1 TABLET ORAL DAILY
Qty: 90 TABLET | Refills: 3 | Status: SHIPPED | OUTPATIENT
Start: 2018-07-25 | End: 2019-06-19 | Stop reason: SDUPTHER

## 2018-07-25 NOTE — TELEPHONE ENCOUNTER
He is on Valsartan. Send new script to [email or web address].  CC    I called in losartan hct.  I d/w with them.  BARBW

## 2018-07-26 ENCOUNTER — OFFICE VISIT (OUTPATIENT)
Dept: NEUROLOGY | Facility: CLINIC | Age: 78
End: 2018-07-26

## 2018-07-26 VITALS
WEIGHT: 164.4 LBS | OXYGEN SATURATION: 94 % | BODY MASS INDEX: 23.02 KG/M2 | HEIGHT: 71 IN | DIASTOLIC BLOOD PRESSURE: 52 MMHG | HEART RATE: 55 BPM | SYSTOLIC BLOOD PRESSURE: 108 MMHG

## 2018-07-26 DIAGNOSIS — F02.80 FRONTOTEMPORAL DEMENTIA (HCC): Primary | ICD-10-CM

## 2018-07-26 DIAGNOSIS — G31.09 FRONTOTEMPORAL DEMENTIA (HCC): Primary | ICD-10-CM

## 2018-07-26 PROCEDURE — 99212 OFFICE O/P EST SF 10 MIN: CPT | Performed by: NURSE PRACTITIONER

## 2018-07-26 RX ORDER — RIVASTIGMINE 13.3 MG/24H
PATCH, EXTENDED RELEASE TRANSDERMAL
COMMUNITY
End: 2018-07-26 | Stop reason: SDUPTHER

## 2018-07-26 RX ORDER — OMEPRAZOLE 20 MG/1
CAPSULE, DELAYED RELEASE ORAL
COMMUNITY

## 2018-07-26 RX ORDER — VALSARTAN AND HYDROCHLOROTHIAZIDE 160; 12.5 MG/1; MG/1
TABLET, FILM COATED ORAL
COMMUNITY
End: 2018-07-26 | Stop reason: SDUPTHER

## 2018-07-26 RX ORDER — AMLODIPINE BESYLATE 10 MG/1
TABLET ORAL
COMMUNITY
End: 2018-07-26 | Stop reason: SDUPTHER

## 2018-07-26 RX ORDER — HYDROCODONE BITARTRATE AND ACETAMINOPHEN 5; 325 MG/1; MG/1
TABLET ORAL
COMMUNITY
End: 2019-03-07

## 2018-07-26 RX ORDER — PRAVASTATIN SODIUM 40 MG
TABLET ORAL
COMMUNITY
End: 2019-03-07

## 2018-07-26 RX ORDER — RIVASTIGMINE 9.5 MG/24H
PATCH, EXTENDED RELEASE TRANSDERMAL
COMMUNITY
End: 2018-07-26 | Stop reason: SDUPTHER

## 2018-07-26 RX ORDER — SOLIFENACIN SUCCINATE 10 MG/1
TABLET, FILM COATED ORAL
COMMUNITY

## 2018-07-26 RX ORDER — TAMSULOSIN HYDROCHLORIDE 0.4 MG/1
CAPSULE ORAL
COMMUNITY
End: 2020-05-19 | Stop reason: SDUPTHER

## 2018-07-26 RX ORDER — NEBIVOLOL 10 MG/1
TABLET ORAL
COMMUNITY
End: 2018-07-26 | Stop reason: SDUPTHER

## 2018-07-26 RX ORDER — ALISKIREN 300 MG/1
TABLET, FILM COATED ORAL
COMMUNITY

## 2018-07-26 RX ORDER — CEPHALEXIN 250 MG/1
CAPSULE ORAL
COMMUNITY
End: 2018-07-26

## 2018-07-26 RX ORDER — TADALAFIL 20 MG/1
TABLET ORAL
COMMUNITY
End: 2021-01-12

## 2018-07-26 RX ORDER — NEBIVOLOL 5 MG/1
TABLET ORAL EVERY 12 HOURS SCHEDULED
COMMUNITY
End: 2019-03-07

## 2018-07-26 NOTE — PROGRESS NOTES
Subjective:     Patient ID: Calos Lam is a 77 y.o. male presenting for follow up for frontotemporal dementia. My last visit with the patient was on January 25, 2018. He is maintained on Exelon 13.3 mg/24 hours. He has been doing well since his last visit. He had repeat neuropsychology testing in October 2017 that did reveal decline in areas of attention, executive memory, verbal memory, visual memory, fluency, and language.     History of Present Illness  The following portions of the patient's history were reviewed and updated as appropriate: allergies, current medications, past family history, past medical history, past social history, past surgical history and problem list.    Review of Systems   Constitutional: Negative for activity change, appetite change and fatigue.   HENT: Negative for ear pain, facial swelling and trouble swallowing.    Eyes: Negative for photophobia, pain and visual disturbance.   Respiratory: Negative for choking, chest tightness and shortness of breath.    Cardiovascular: Negative for chest pain, palpitations and leg swelling.   Gastrointestinal: Negative for abdominal pain, constipation and nausea.   Endocrine: Negative for polydipsia, polyphagia and polyuria.   Genitourinary: Negative for difficulty urinating, frequency and urgency.   Musculoskeletal: Negative for back pain, gait problem and neck pain.   Skin: Negative for color change, rash and wound.   Allergic/Immunologic: Negative for environmental allergies, food allergies and immunocompromised state.   Neurological: Negative for dizziness, tremors, seizures, syncope, facial asymmetry, speech difficulty, weakness, light-headedness, numbness and headaches.   Hematological: Negative for adenopathy. Bruises/bleeds easily.   Psychiatric/Behavioral: Positive for agitation and confusion. Negative for behavioral problems, decreased concentration, dysphoric mood, hallucinations, self-injury, sleep disturbance and suicidal ideas. The  patient is not nervous/anxious and is not hyperactive.         Objective:    Neurologic Exam  This patient was well-developed, well-nourished and in no acute distress.      GAIT: Gait, station, heel, toe and tandem walk were normal. There was no drift of the arms. Romberg’s sign was not present.       VASCULAR: The carotid arteries had normal upstroke to palpation without bruits. The vertebral arteries were without bruits. The radial pulses were equal and without delay. Cardiac examination revealed no murmurs with a regular rhythm. Pedal pulses were normal. The extremities were without cyanosis, clubbing or edema.      MENTAL STATUS: This patient was alert and oriented to person, place, time and situation. Recent and remote memory - intact. Attention span, fund of knowledge and concentration were normal. He had difficulty repeating simple sentences, mixes up several words. More in depth memory testing-reviewed Dr. Flaherty's report.     CRANIAL NERVES: Olfaction-not tested. Visual fields full in all quadrants to confrontation. The pupils were equally round and reactive to light at 3-4 mm. There was no evidence of a Sanchez Essence pupil. Funduscopic exam revealed no papilledema, hemorrhage or exudate. The gaze was conjugate. The vertical and horizontal eye movements were full without nystagmus. There was no facial weakness. There was no facial sensory loss to pinprick bilaterally. Hearing was normal for light finger rub and casual speech. Speech is normal with trace  dysarthria. The neck is supple and strength is normal in rotation, flexion and extension. Tongue and palate movements are normal. He has had eyelid surgery for ptosis recently      MOTOR UPPER EXTREMTIES: Bilaterally the deltoid, biceps, triceps, wrist extensors, intrinsic hand muscles, and  were grade 5 and normal. Bulk, tone and strength of these muscles were normal without abnormal movements.      MOTOR LOWER EXTREMITIES: Bilaterally the hip flexors,  hip abductors, knee flexors and extensors, ankle plantar flexors and dorsiflexors were grade 5 with normal. Bulk, tone and strength of these muscles were normal without abnormal movements.  DEEP TENDON REFLEXES: Bilateral biceps, triceps, and brachioradialis reflexes were grade 1 symmetric. Bilateral knee, hamstrings and ankle reflexes were grade 1 and symmetric. The plantar responses were downgoing. Ankle clonus was not present.      CEREBELLAR: Finger to nose, rapid finger opposition, and heel-to-shin tests were performed normally, bilaterally.      MUSCULOSKELETAL: Normal range of motion of the neck is noted. There was no back pain with straight leg raise. Internal and external rotation of the hips was normal.       SENSORY: There was normal upper and lower extremity sensation to vibration, proprioception, and pinprick.      HIGHER CORTICAL FUNCTION: There were no aphasic or apraxic errors. Visual fields were intact to confrontation.    Physical Exam   Constitutional: He appears well-developed and well-nourished.   Neck: Normal range of motion. Neck supple.   Nursing note and vitals reviewed.      Assessment/Plan:     Calos was seen today for dementia.    Diagnoses and all orders for this visit:    Frontotemporal dementia            The patient and his wife decline much, if any, decline in his memory since his last visit as evident by his repeat neuropsychology testing. He and his wife however feel he has been doing fairly well. I will have him continue Exelon 13.3 mg/24 hours. Discussed the importance of remaining cognitively, physically, and socially active for best outcomes. Again encouraged him to refrain from alcohol.      F/u 1 year     I have reviewed the evaluation and agree with the findings and plan. VALENTIN Santiago

## 2018-08-15 ENCOUNTER — OFFICE VISIT (OUTPATIENT)
Dept: SLEEP MEDICINE | Facility: HOSPITAL | Age: 78
End: 2018-08-15
Attending: INTERNAL MEDICINE

## 2018-08-15 DIAGNOSIS — G47.33 OSA (OBSTRUCTIVE SLEEP APNEA): ICD-10-CM

## 2018-08-15 DIAGNOSIS — G47.61 PLMD (PERIODIC LIMB MOVEMENT DISORDER): Primary | ICD-10-CM

## 2018-08-15 DIAGNOSIS — IMO0002 SLEEP-RELATED HYPOVENTILATION: ICD-10-CM

## 2018-08-15 PROCEDURE — 99213 OFFICE O/P EST LOW 20 MIN: CPT | Performed by: INTERNAL MEDICINE

## 2018-08-15 PROCEDURE — G0463 HOSPITAL OUTPT CLINIC VISIT: HCPCS

## 2018-08-15 NOTE — PROGRESS NOTES
Follow Up [facility] Note     Chief Complaint:  FIORELLA     Primary Care Physician: Karlie Hart MD    Interval History:   The patient was last seen by me in June.  He is using an oral appliance.  Home sleep study performed July 18, 2018 with oral appliance in place.  Weight 170 pounds.  Mild obstructive sleep apnea persist with AHI 13 events per hour.  Sleep-related hypoxemia also present with low O2 saturation of 78% 422 minutes.  The patient snored 76% of the time.    The patient states he is improved with his OA and place.  [place] Sleepiness Scale is normal at 4.    Review of Systems:  Recorded on the Sleep Questionnaire.  Unremarkable .    Social History:    Social History     Social History   • Marital status:    • Number of children: 1     Occupational History   • [employer]      Social History Main Topics   • Smoking status: Former Smoker     Types: Cigarettes     Quit date: 1973   • Smokeless tobacco: Never Used   • Alcohol use No   • Drug use: No     Other Topics Concern   • Not on file       Allergies:  Beta adrenergic blockers and Sulfa antibiotics     Medication Review:  Reviewed.      Vital Signs:  Height 69.5 inches, weight 168 pounds and BMI is normal at 24.5.    Physical Exam:    Constitutional:  Well developed white male and appears in no apparent distress.  Awake & oriented times 3.  Normal mood with normal recent and remote memory and normal judgement.  Eyes:  Conjunctivae normal.  Oropharynx:  moist mucous membranes without exudate and a normal sized tongue and uvula and moderate-severe narrowing of the posterior pharyngeal opening and he does have an overbite      Results Review:  I reviewed the results of his home sleep study    Impression:   Obstructive sleep apnea by overnight polysomnogram January 20, 2018.  Mild obstructive sleep apnea for total sleep time; mainly moderate severity when supine and during REM.  On his left side he is normal.   There was also  evidence for periodic limb movement disorder.  Treated with Mirapex   Home sleep study July 18, 2018, weight 170 pounds, mild obstructive sleep apnea with AHI 13 events per hour.  Sleep-related hypoxemia with low O2 saturation 78% for 22 minutes.  Total times snoring 76%      Plan:  Good sleep hygiene measures should be maintained.  The patient is benefiting from the treatment being provided.     I reviewed all with the patient.  Further adjustment of his oral appliance is indicated.  After 2 or 3 mm advancement, reevaluation should be considered due to the sleep-related hypoxemia persisting.    The patient will call for any problems and will follow up after changes to his oral appliance.      Julian Dahl MD  Sleep Medicine  08/15/18  12:06 PM

## 2018-08-21 DIAGNOSIS — E11.9 TYPE 2 DIABETES MELLITUS WITHOUT COMPLICATION, UNSPECIFIED LONG TERM INSULIN USE STATUS: Primary | ICD-10-CM

## 2018-08-21 DIAGNOSIS — I10 ESSENTIAL HYPERTENSION: ICD-10-CM

## 2018-08-21 DIAGNOSIS — E78.2 MIXED HYPERLIPIDEMIA: ICD-10-CM

## 2018-08-22 ENCOUNTER — OFFICE VISIT (OUTPATIENT)
Dept: INTERNAL MEDICINE | Facility: CLINIC | Age: 78
End: 2018-08-22

## 2018-08-22 ENCOUNTER — HOSPITAL ENCOUNTER (OUTPATIENT)
Dept: GENERAL RADIOLOGY | Facility: HOSPITAL | Age: 78
Discharge: HOME | End: 2018-08-22
Admitting: INTERNAL MEDICINE

## 2018-08-22 VITALS
SYSTOLIC BLOOD PRESSURE: 124 MMHG | OXYGEN SATURATION: 98 % | WEIGHT: 164 LBS | DIASTOLIC BLOOD PRESSURE: 70 MMHG | HEART RATE: 52 BPM | BODY MASS INDEX: 23.19 KG/M2

## 2018-08-22 DIAGNOSIS — M25.512 ACUTE PAIN OF LEFT SHOULDER: ICD-10-CM

## 2018-08-22 DIAGNOSIS — M25.512 ACUTE PAIN OF LEFT SHOULDER: Primary | ICD-10-CM

## 2018-08-22 LAB
ALBUMIN SERPL-MCNC: 4.3 G/DL (ref 3.5–5.2)
ALBUMIN/GLOB SERPL: 1.5 G/DL
ALP SERPL-CCNC: 53 U/L (ref 39–117)
ALT SERPL-CCNC: 16 U/L (ref 1–41)
AST SERPL-CCNC: 17 U/L (ref 1–40)
BASOPHILS # BLD AUTO: 0.01 10*3/MM3 (ref 0–0.2)
BASOPHILS NFR BLD AUTO: 0.1 % (ref 0–1.5)
BILIRUB SERPL-MCNC: 0.5 MG/DL (ref 0.1–1.2)
BUN SERPL-MCNC: 21 MG/DL (ref 8–23)
BUN/CREAT SERPL: 23.1 (ref 7–25)
CALCIUM SERPL-MCNC: 9.6 MG/DL (ref 8.6–10.5)
CHLORIDE SERPL-SCNC: 100 MMOL/L (ref 98–107)
CHOLEST SERPL-MCNC: 164 MG/DL (ref 0–200)
CO2 SERPL-SCNC: 30.9 MMOL/L (ref 22–29)
CREAT SERPL-MCNC: 0.91 MG/DL (ref 0.76–1.27)
EOSINOPHIL # BLD AUTO: 0.21 10*3/MM3 (ref 0–0.7)
EOSINOPHIL NFR BLD AUTO: 3.1 % (ref 0.3–6.2)
ERYTHROCYTE [DISTWIDTH] IN BLOOD BY AUTOMATED COUNT: 13.4 % (ref 11.5–14.5)
GLOBULIN SER CALC-MCNC: 2.8 GM/DL
GLUCOSE SERPL-MCNC: 132 MG/DL (ref 65–99)
HBA1C MFR BLD: 6.1 % (ref 4.8–5.6)
HCT VFR BLD AUTO: 48.7 % (ref 40.4–52.2)
HDLC SERPL-MCNC: 55 MG/DL (ref 40–60)
HGB BLD-MCNC: 15.9 G/DL (ref 13.7–17.6)
IMM GRANULOCYTES # BLD: 0 10*3/MM3 (ref 0–0.03)
IMM GRANULOCYTES NFR BLD: 0 % (ref 0–0.5)
LDLC SERPL CALC-MCNC: 98 MG/DL (ref 0–100)
LYMPHOCYTES # BLD AUTO: 1.73 10*3/MM3 (ref 0.9–4.8)
LYMPHOCYTES NFR BLD AUTO: 25.9 % (ref 19.6–45.3)
MCH RBC QN AUTO: 30.5 PG (ref 27–32.7)
MCHC RBC AUTO-ENTMCNC: 32.6 G/DL (ref 32.6–36.4)
MCV RBC AUTO: 93.5 FL (ref 79.8–96.2)
MONOCYTES # BLD AUTO: 0.72 10*3/MM3 (ref 0.2–1.2)
MONOCYTES NFR BLD AUTO: 10.8 % (ref 5–12)
NEUTROPHILS # BLD AUTO: 4.02 10*3/MM3 (ref 1.9–8.1)
NEUTROPHILS NFR BLD AUTO: 60.1 % (ref 42.7–76)
PLATELET # BLD AUTO: 241 10*3/MM3 (ref 140–500)
POTASSIUM SERPL-SCNC: 4.2 MMOL/L (ref 3.5–5.2)
PROT SERPL-MCNC: 7.1 G/DL (ref 6–8.5)
RBC # BLD AUTO: 5.21 10*6/MM3 (ref 4.6–6)
SODIUM SERPL-SCNC: 141 MMOL/L (ref 136–145)
TRIGL SERPL-MCNC: 54 MG/DL (ref 0–150)
VLDLC SERPL CALC-MCNC: 10.8 MG/DL (ref 5–40)
WBC # BLD AUTO: 6.69 10*3/MM3 (ref 4.5–10.7)

## 2018-08-22 PROCEDURE — 99213 OFFICE O/P EST LOW 20 MIN: CPT | Performed by: INTERNAL MEDICINE

## 2018-08-22 PROCEDURE — 73030 X-RAY EXAM OF SHOULDER: CPT

## 2018-08-22 RX ORDER — METHYLPREDNISOLONE 4 MG/1
TABLET ORAL
Qty: 21 TABLET | Refills: 0 | Status: SHIPPED | OUTPATIENT
Start: 2018-08-22 | End: 2018-08-29

## 2018-08-22 RX ORDER — IBUPROFEN 600 MG/1
TABLET ORAL
COMMUNITY
Start: 2018-07-30 | End: 2021-08-11

## 2018-08-22 NOTE — PROGRESS NOTES
Subjective     Calos Lam is a 77 y.o. male who presents with   Chief Complaint   Patient presents with   • Arm Pain       History of Present Illness     Left arm pain.  Going on for a week.  No injury.  Hurts to raise it.  No swelling.  No weakness.  Doing more lifting helping his sister move.     Review of Systems   Respiratory: Negative.    Cardiovascular: Negative.        The following portions of the patient's history were reviewed and updated as appropriate: allergies, current medications and problem list.    Patient Active Problem List    Diagnosis Date Noted   • PLMD (periodic limb movement disorder) 02/10/2018   • Colon polyp 07/25/2017     Note Last Updated: 8/31/2017     Overview:   Added automatically from request for surgery [id number]     • History of bladder cancer 02/18/2017   • RLS (restless legs syndrome) 02/18/2017   • Type 2 diabetes mellitus without complication (CMS/[facility]) 02/15/2017   • Essential hypertension    • Mixed hyperlipidemia    • Frontotemporal dementia 01/18/2017   • FIORELLA (obstructive sleep apnea) intolerent CPAP 12/11/2016   • Sleep-related hypoventilation/hypoxia treated with O2 2L NC at HS 12/11/2016       Current Outpatient Prescriptions on File Prior to Visit   Medication Sig Dispense Refill   • aliskiren (TEKTURNA) 300 MG tablet Tekturna 300 mg tablet   take 1 tablet by mouth once daily     • amLODIPine (NORVASC) 10 MG tablet Take 1 tablet by mouth Daily. 90 tablet 3   • ASPIRIN 81 PO Take  by mouth Daily.     • HYDROcodone-acetaminophen (NORCO) 5-325 MG per tablet hydrocodone 5 mg-acetaminophen 325 mg tablet     • losartan-hydrochlorothiazide (HYZAAR) 50-12.5 MG per tablet Take 1 tablet by mouth Daily. 90 tablet 3   • nebivolol (BYSTOLIC) 5 MG tablet Every 12 (Twelve) Hours.     • omeprazole (PRILOSEC) 20 MG capsule Prilosec 20 mg capsule,delayed release   Take 1 capsule every day by oral route.     • pramipexole (MIRAPEX) 0.25 MG tablet Take 2 tablets by mouth Every Night. 60  tablet 3   • pravastatin (PRAVACHOL) 40 MG tablet pravastatin 40 mg tablet     • rivastigmine (EXELON) 13.3 MG/24HR patch Place 1 patch on the skin Daily. 90 patch 1   • solifenacin (VESICARE) 10 MG tablet Vesicare 10 mg tablet   Take 1 tablet every day by oral route.     • tadalafil (CIALIS) 20 MG tablet Cialis 20 mg tablet   Take 1 tablet every day by oral route as needed.     • tamsulosin (FLOMAX) 0.4 MG capsule 24 hr capsule tamsulosin 0.4 mg capsule     • vitamin D (ERGOCALCIFEROL) 64590 units capsule capsule Take 1 capsule by mouth 1 (One) Time Per Week. 60 capsule 3     No current facility-administered medications on file prior to visit.        Objective     /70   Pulse 52   Wt 74.4 kg (164 lb)   SpO2 98%   BMI 23.19 kg/m²     Physical Exam   Constitutional: He is oriented to person, place, and time. He appears well-developed and well-nourished.   HENT:   Head: Atraumatic.   Musculoskeletal:        Left shoulder: He exhibits decreased range of motion and tenderness. He exhibits no bony tenderness, no swelling, no effusion, no crepitus and no deformity.   Neurological: He is alert and oriented to person, place, and time.   Psychiatric: He has a normal mood and affect.       Assessment/Plan   Calos was seen today for arm pain.    Diagnoses and all orders for this visit:    Acute pain of left shoulder  -     XR Shoulder 1 View Left; Future    Other orders  -     MethylPREDNISolone (MEDROL, YENIFER,) 4 MG tablet; Take as directed on package instructions.        Discussion  Patient presents with acute pain of the left shoulder after increased lifting.  Trial of conservative management with rest and a steroid yenifer.  Let me know if not feeling better over the next several weeks or if there is any change in symptoms.         Future Appointments  Date Time Provider Department Center   8/22/2018 9:00 AM [facility] None   8/22/2018 11:30 AM Karlie Hart MD [facility] None   8/29/2018  3:00 PM Karlie Hart MD [facility] None   12/5/2018 11:30 AM Julian Dahl MD [facility] None   8/1/2019 11:00 AM Presley Kyle APRN [facility] None

## 2018-08-29 ENCOUNTER — OFFICE VISIT (OUTPATIENT)
Dept: INTERNAL MEDICINE | Facility: CLINIC | Age: 78
End: 2018-08-29

## 2018-08-29 VITALS
BODY MASS INDEX: 22.63 KG/M2 | DIASTOLIC BLOOD PRESSURE: 70 MMHG | OXYGEN SATURATION: 97 % | SYSTOLIC BLOOD PRESSURE: 110 MMHG | HEART RATE: 62 BPM | WEIGHT: 160 LBS

## 2018-08-29 DIAGNOSIS — M79.602 LEFT ARM PAIN: ICD-10-CM

## 2018-08-29 DIAGNOSIS — I10 ESSENTIAL HYPERTENSION: ICD-10-CM

## 2018-08-29 DIAGNOSIS — E78.2 MIXED HYPERLIPIDEMIA: ICD-10-CM

## 2018-08-29 DIAGNOSIS — E11.9 TYPE 2 DIABETES MELLITUS WITHOUT COMPLICATION, UNSPECIFIED LONG TERM INSULIN USE STATUS: Primary | ICD-10-CM

## 2018-08-29 PROCEDURE — 99214 OFFICE O/P EST MOD 30 MIN: CPT | Performed by: INTERNAL MEDICINE

## 2018-08-29 NOTE — PROGRESS NOTES
Subjective     Calos Lam is a 77 y.o. male who presents with   Chief Complaint   Patient presents with   • Diabetes   • Hypertension   • Hyperlipidemia       History of Present Illness     Dm-2.  Good BS control.     HTN.  Control is excellent.   HLD.  He is maintained on pravastatin with good control.    Review of Systems   Respiratory: Negative.    Cardiovascular: Negative.    Musculoskeletal:        Left arm pain from last visit continues.        The following portions of the patient's history were reviewed and updated as appropriate: allergies, current medications and problem list.    Patient Active Problem List    Diagnosis Date Noted   • PLMD (periodic limb movement disorder) 02/10/2018   • Colon polyp 07/25/2017     Note Last Updated: 8/31/2017     Overview:   Added automatically from request for surgery [id number]     • History of bladder cancer 02/18/2017   • RLS (restless legs syndrome) 02/18/2017   • Type 2 diabetes mellitus without complication (CMS/[facility]) 02/15/2017   • Essential hypertension    • Mixed hyperlipidemia    • Frontotemporal dementia 01/18/2017   • FIORELLA (obstructive sleep apnea) intolerent CPAP 12/11/2016   • Sleep-related hypoventilation/hypoxia  12/11/2016       Current Outpatient Prescriptions on File Prior to Visit   Medication Sig Dispense Refill   • aliskiren (TEKTURNA) 300 MG tablet Tekturna 300 mg tablet   take 1 tablet by mouth once daily     • amLODIPine (NORVASC) 10 MG tablet Take 1 tablet by mouth Daily. 90 tablet 3   • ASPIRIN 81 PO Take  by mouth Daily.     • HYDROcodone-acetaminophen (NORCO) 5-325 MG per tablet hydrocodone 5 mg-acetaminophen 325 mg tablet     •  MG tablet      • losartan-hydrochlorothiazide (HYZAAR) 50-12.5 MG per tablet Take 1 tablet by mouth Daily. 90 tablet 3   • nebivolol (BYSTOLIC) 5 MG tablet Every 12 (Twelve) Hours.     • omeprazole (PRILOSEC) 20 MG capsule Prilosec 20 mg capsule,delayed release   Take 1 capsule every day by oral route.     •  pramipexole (MIRAPEX) 0.25 MG tablet Take 2 tablets by mouth Every Night. 60 tablet 3   • pravastatin (PRAVACHOL) 40 MG tablet pravastatin 40 mg tablet     • rivastigmine (EXELON) 13.3 MG/24HR patch Place 1 patch on the skin Daily. 90 patch 1   • solifenacin (VESICARE) 10 MG tablet Vesicare 10 mg tablet   Take 1 tablet every day by oral route.     • tadalafil (CIALIS) 20 MG tablet Cialis 20 mg tablet   Take 1 tablet every day by oral route as needed.     • tamsulosin (FLOMAX) 0.4 MG capsule 24 hr capsule tamsulosin 0.4 mg capsule     • vitamin D (ERGOCALCIFEROL) 59368 units capsule capsule Take 1 capsule by mouth 1 (One) Time Per Week. 60 capsule 3     No current facility-administered medications on file prior to visit.        Objective     /70   Pulse 62   Wt 72.6 kg (160 lb)   SpO2 97%   BMI 22.63 kg/m²     Physical Exam   Constitutional: He is oriented to person, place, and time. He appears well-developed and well-nourished.   HENT:   Head: Atraumatic.   Cardiovascular: Normal rate, regular rhythm and normal heart sounds.    Pulmonary/Chest: Effort normal and breath sounds normal.   Neurological: He is alert and oriented to person, place, and time.   Skin: Skin is warm and dry.   Psychiatric: He has a normal mood and affect.       Assessment/Plan   Calos was seen today for diabetes, hypertension and hyperlipidemia.    Diagnoses and all orders for this visit:    Type 2 diabetes mellitus without complication, unspecified long term insulin use status (CMS/[facility])    Essential hypertension    Mixed hyperlipidemia    Left arm pain  -     Ambulatory Referral to Physical Therapy        Discussion    DM-2. Continue healthy diet.    HTN. Continue current regimen.   HLD.  Continue pravastatin.    Persistent left arm and shoulder pain.  Trial of conservative management with physical therapy.  Let me know if not feeling better over the next several weeks or if there is any change in symptoms.         Future  Appointments  Date Time Provider Department Center   12/5/2018 11:30 AM Julian Dahl MD [facility] None   2/27/2019 9:40 AM [facility] None   3/8/2019 1:30 PM Karlie Hart MD [facility] None   8/1/2019 11:00 AM Presley Kyle APRN [facility] None

## 2018-09-11 ENCOUNTER — HOSPITAL ENCOUNTER (OUTPATIENT)
Dept: PHYSICAL THERAPY | Facility: HOSPITAL | Age: 78
Setting detail: THERAPIES SERIES
Discharge: HOME | End: 2018-09-11

## 2018-09-11 DIAGNOSIS — M77.8 LEFT SHOULDER TENDONITIS: Primary | ICD-10-CM

## 2018-09-11 DIAGNOSIS — M79.602 LEFT ARM PAIN: ICD-10-CM

## 2018-09-11 PROCEDURE — G8984 CARRY CURRENT STATUS: HCPCS | Performed by: PHYSICAL THERAPIST

## 2018-09-11 PROCEDURE — G8985 CARRY GOAL STATUS: HCPCS | Performed by: PHYSICAL THERAPIST

## 2018-09-11 PROCEDURE — 97161 PT EVAL LOW COMPLEX 20 MIN: CPT | Performed by: PHYSICAL THERAPIST

## 2018-09-11 PROCEDURE — 97110 THERAPEUTIC EXERCISES: CPT | Performed by: PHYSICAL THERAPIST

## 2018-09-11 NOTE — THERAPY EVALUATION
Outpatient Physical Therapy Ortho Initial Evaluation  [facility]     Patient Name: Calos Lam  : 1940  MRN: [id number]  Today's Date: 2018      Visit Date: 2018    Patient Active Problem List   Diagnosis   • FIORELLA (obstructive sleep apnea) intolerent CPAP   • Sleep-related hypoventilation/hypoxia    • Frontotemporal dementia   • Essential hypertension   • Mixed hyperlipidemia   • Type 2 diabetes mellitus without complication (CMS/HCC)   • History of bladder cancer   • RLS (restless legs syndrome)   • Colon polyp   • PLMD (periodic limb movement disorder)        Past Medical History:   Diagnosis Date   • Essential hypertension    • GERD (gastroesophageal reflux disease)    • Impacted cerumen    • Impotence of organic origin    • Malaise and fatigue    • Mantoux: positive    • Memory loss    • Mixed hyperlipidemia    • Organic sleep apnea    • Pain in thoracic spine    • Primary malignant neoplasm of bladder (CMS/HCC) 2009   • Restless leg syndrome, uncontrolled    • Testicular hypofunction    • Vertigo    • Vitamin D deficiency         Past Surgical History:   Procedure Laterality Date   • APPENDECTOMY     • COLON RESECTION LEFT Left    • COLONOSCOPY  2012    normal   • EYE SURGERY      lasik. Dr. Storey   • LASIK Bilateral    • ROOT CANAL     • TURP / TRANSURETHRAL INCISION / DRAINAGE PROSTATE         Visit Dx:     ICD-10-CM ICD-9-CM   1. Left shoulder tendonitis M75.82 726.10   2. Left arm pain M79.602 729.5             Patient History     Row Name 18 1300             History    Chief Complaint Pain  -KH      Type of Pain Upper Extremity / Arm  -      Date Current Problem(s) Began 18  -KH      Brief Description of Current Complaint Patient was moving his older sister into a senior living facility and hurt his arm on 18. Patient doesn't recall any specific motion where he injured the arm, he just noticed later that evening some  discomfort/soreness.  -KH      Previous treatment for THIS PROBLEM Medication   NSAID  -      Patient/Caregiver Goals Relieve pain  -      Hand Dominance right-handed  -      Occupation/sports/leisure activities enjoys doing mini-marathons, gardening, involved in legislative affairs  -KH      Patient seeing anyone else for problem(s)? Dr. Karlie Hart  -      How has patient tried to help current problem? NSAIDS  -      What clinical tests have you had for this problem? X-ray  -      Results of Clinical Tests no abnormalaties  -         Pain     Pain Location Shoulder  -      Pain at Present 3  -KH      Pain at Best 3  -KH      Pain at Worst 6  -KH      Pain Frequency Constant/continuous  -      Pain Description Aching  -      What Performance Factors Make the Current Problem(s) WORSE? overhead activity  -      What Performance Factors Make the Current Problem(s) BETTER? rest  -      Is your sleep disturbed? Yes  -      Is medication used to assist with sleep? Yes   ibprofen  -      Total hours of sleep per night 5-6 hours  -      What position do you sleep in? Right sidelying  -KH      Difficulties at work? retired  -      Difficulties with ADL's? has to hold onto arm when elevated for things like putting on deodorant  -      Difficulties with recreational activities? unable to put items on higher shelves  -         Fall Risk Assessment    Any falls in the past year: No  -KH         Daily Activities    Primary Language English  -      Patient is concerned about/has problems with Difficulty with self care (i.e. bathing, dressing, toileting:;Performing home management (household chores, shopping, care of dependents);Performing job responsibilities/community activities (work, school,;Repetitive movements of the hand, arm, shoulder;Reaching over head  -      Pt Participated in POC and Goals Yes  -         Safety    Are you being hurt, hit, or frightened by anyone at home or in  your life? No  -KH      Are you being neglected by a caregiver No  -KH        User Key  (r) = Recorded By, (t) = Taken By, (c) = Cosigned By    Initials Name Provider Type    Jennifer Sawyer, PT Physical Therapist                PT Ortho     Row Name 09/11/18 1300       Subjective Comments    Subjective Comments I have trouble lifting my arm above my head  -KH       Subjective Pain    Able to rate subjective pain? yes  -KH    Pre-Treatment Pain Level 3  -KH    Post-Treatment Pain Level 3  -KH       Posture/Observations    Alignment Options Forward head;Cervical lordosis;Thoracic kyphosis;Rounded shoulders  -KH    Forward Head Severe  -KH    Cervical Lordosis Moderate  -KH    Thoracic Kyphosis Severe  -KH    Rounded Shoulders Severe  -KH       Special Tests/Palpation    Special Tests/Palpation Shoulder  -KH       Shoulder Impingement/Rotator Cuff Special Tests    Hare-Austin Test (RC Lesion vs. Bursitis) Negative  -KH    Neer Impingement Test (RC Lesion vs. Bursitis) Negative  -    Empty Can Test (RC Lesion) Negative  -       Shoulder Girdle Palpation    Deltoid Left:;Tender  -KH       General ROM    RT Upper Ext Rt Shoulder ABduction;Rt Shoulder Extension;Rt Shoulder Flexion;Rt Shoulder Internal Rotation;Rt Shoulder External Rotation  -KH    LT Upper Ext Lt Shoulder ABduction;Lt Shoulder Extension;Lt Shoulder Flexion;Lt Shoulder External Rotation;Lt Shoulder Internal Rotation  -KH       Right Upper Ext    Rt Shoulder Abduction AROM 165  -KH    Rt Shoulder Extension AROM 65  -KH    Rt Shoulder Flexion AROM 160  -KH    Rt Shoulder External Rotation AROM 52  -KH    Rt Shoulder Internal Rotation AROM can reach behind back to T5  -KH       Left Upper Ext    Lt Shoulder Abduction AROM 142  -KH    Lt Shoulder Extension AROM 55  -KH    Lt Shoulder Flexion AROM 145  -KH    Lt Shoulder External Rotation AROM 35  -KH    Lt Shoulder Internal Rotation AROM can reach behind back to T6  -KH       MMT (Manual Muscle  Testing)    Rt Upper Ext Rt Shoulder Extension;Rt Shoulder Flexion;Rt Shoulder ABduction;Rt Shoulder Internal Rotation;Rt Shoulder External Rotation  -KH    Lt Upper Ext Lt Shoulder Flexion;Lt Shoulder ABduction;Lt Shoulder Extension;Lt Shoulder Internal Rotation;Lt Shoulder External Rotation  -KH       MMT Right Upper Ext    Rt Shoulder Flexion MMT, Gross Movement (5/5) normal  -KH    Rt Shoulder Extension MMT, Gross Movement (5/5) normal  -KH    Rt Shoulder ABduction MMT, Gross Movement (5/5) normal  -KH    Rt Shoulder Internal Rotation MMT, Gross Movement (5/5) normal  -KH    Rt Shoulder External Rotation MMT, Gross Movement (4/5) good  -KH       MMT Left Upper Ext    Lt Shoulder Flexion MMT, Gross Movement (3+/5) fair plus  -KH    Lt Shoulder Extension MMT, Gross Movement (4-/5) good minus  -KH    Lt Shoulder ABduction MMT, Gross Movement (3+/5) fair plus  -KH    Lt Shoulder Internal Rotation MMT, Gross Movement (4-/5) good minus  -KH    Lt Shoulder External Rotation MMT, Gross Movement (3+/5) fair plus  -KH       Sensation    Sensation WNL? WNL  -KH      User Key  (r) = Recorded By, (t) = Taken By, (c) = Cosigned By    Initials Name Provider Type    Jennifer Sawyer, PT Physical Therapist                     PT OP Goals     Row Name 09/11/18 1400          PT Short Term Goals    STG 1 Patient will demonstrate safety and independence with initial hEP  -KH     STG 2 Patient will increase left shoulder flex/abduction to 155 degrees or greater to reduce pain with overhead activities  -KH     STG 3 Patient will increase left shoulder flex/abduction strength to 4/5 or greater to improve stability with lifting household items  -KH        Long Term Goals    LTG 1 Patient will increase left shoulder flex/abduction to 160 degrees or greater to reduce pain with overhead activities  -KH     LTG 2 Patient will reduce level of perceived disability on the SPADI from 28% disability to </=20% disability in order to improve  quality of life  -     LTG 3 Patient will reduce peak pain level with overheard reaching from 6/10 to 4/10   -       User Key  (r) = Recorded By, (t) = Taken By, (c) = Cosigned By    Initials Name Provider Type    Jennifer Sawyer, PT Physical Therapist                PT Assessment/Plan     Row Name 09/11/18 1416          PT Assessment    Functional Limitations Limitations in functional capacity and performance;Performance in leisure activities;Performance in self-care ADL;Performance in sport activities;Limitations in community activities;Limitation in home management;Decreased safety during functional activities  -     Impairments Muscle strength;Pain;Posture;Range of motion  -     Assessment Comments Calos Lam is a 77 y.o. year-old male referred to physical therapy for left arm pain. Patient was moving his older sister into a senior living facility and hurt his arm on 7/24/18. Patient doesn't recall any specific motion where he injured the arm, he just noticed later that evening some discomfort/soreness. He presents with a  stable clinical presentation.  He has comorbidities of bladder cancer and personal factors of advanced age that may affect his participation in the plan of care.  Signs and symptoms are consistent with physical therapy diagnosis of left shoulder tendonitis. Upon physical examination patient presents with decreased left shoulder AROM and strength, as well as tenderness with palpation of the left deltoid muscle. Patient also demonstrate excessive forward head posture. Patient is appropriate for skilled physical therapy to reduce pain and increase ease with daily mobility.   -GEETA     Please refer to paper survey for additional self-reported information Yes  -KH     Rehab Potential Good  -KH     Patient/caregiver participated in establishment of treatment plan and goals Yes  -GEETA     Patient would benefit from skilled therapy intervention Yes  -KH        PT Plan    PT Frequency 2x/week   -KH     Planned CPT's? PT EVAL LOW COMPLEXITY: 09477;PT THER PROC EA 15 MIN: 53377;PT GAIT TRAINING EA 15 MIN: 90116;PT HOT OR COLD PACK TREAT MCARE;PT TRACTION CERVICAL: 07933;PT ELECTRICAL STIM UNATTEND: ;PT THER ACT EA 15 MIN: 72293;PT MANUAL THERAPY EA 15 MIN: 57409;PT AQUATIC THERAPY EA 15 MIN: 30569;PT ELECTRICAL STIM ATTD EA 15 MIN: 00991;PT HOT/COLD PACK WC NONMCARE: 96711;PT IONTOPHORESIS EA 15 MIN: 62858;PT ULTRASOUND EA 15 MIN: 11309;PT SELF CARE/HOME MGMT/TRAIN EA 15: 12504;PT NEUROMUSC RE-EDUCATION EA 15 MIN: 82319;PT RE-EVAL: 99051  -KH     PT Plan Comments L shoulder mobility and strength, modalaties PRN for pain  -KH       User Key  (r) = Recorded By, (t) = Taken By, (c) = Cosigned By    Initials Name Provider Type    Jennifer Sawyer, PT Physical Therapist                  Exercises     Row Name 09/11/18 1300             Subjective Comments    Subjective Comments I have trouble lifting my arm above my head  -KH         Subjective Pain    Able to rate subjective pain? yes  -KH      Pre-Treatment Pain Level 3  -KH      Post-Treatment Pain Level 3  -KH         Total Minutes    10781 - PT Therapeutic Exercise Minutes 10  -KH         Exercise 1    Exercise Name 1 Cane flex/abd AAROM  -KH      Reps 1 10   -KH      Time 1 10 sec  -KH         Exercise 2    Exercise Name 2 Wall walks flex  -KH      Reps 2 10  -KH      Time 2 10 sec  -KH        User Key  (r) = Recorded By, (t) = Taken By, (c) = Cosigned By    Initials Name Provider Type    Jennifer Sawyer, PT Physical Therapist      Outcome Measure Options: Other Outcome Measure (SPADI: 28% disability)         Time Calculation:     Therapy Suggested Charges     Code   Minutes Charges    36115 (CPT®) Hc Pt Neuromusc Re Education Ea 15 Min      96947 (CPT®) Hc Pt Ther Proc Ea 15 Min 10 1    14114 (CPT®) Hc Gait Training Ea 15 Min      43395 (CPT®) Hc Pt Therapeutic Act Ea 15 Min      39859 (CPT®) Hc Pt Manual Therapy Ea 15 Min      11006 (CPT®) Hc Pt Ther  Massage- Per 15 Min      92132 (CPT®) Hc Pt Iontophoresis Ea 15 Min      36912 (CPT®) Hc Pt Elec Stim Ea-Per 15 Min      88474 (CPT®) Hc Pt Ultrasound Ea 15 Min      52085 (CPT®) Hc Pt Self Care/Mgmt/Train Ea 15 Min      84956 (CPT®) Hc Pt Prosthetic (S) Train Initial Encounter, Each 15 Min      93515 (CPT®) Hc Orthotic(S) Mgmt/Train Initial Encounter, Each 15min      52621 (CPT®) Hc Pt Aquatic Therapy Ea 15 Min      63153 (CPT®) Hc Pt Orthotic(S)/Prosthetic(S) Encounter, Each 15 Min      Total  10 1          Start Time: 1335  Stop Time: 1415  Time Calculation (min): 40 min     Therapy Charges for Today     Code Description Service Date Service Provider Modifiers Qty    56277955807 HC PT CARRY MOV HAND OBJ CURRENT 9/11/2018 Jennifer Brown, PT GP, CJ 1    13044087537 HC PT CARRY MOV HAND OBJ PROJECTED 9/11/2018 Jennifer Brown, PT GP, CI 1    45191815247 HC PT THER PROC EA 15 MIN 9/11/2018 Jennifer Brown, PT GP 1    15947993317 HC PT EVAL LOW COMPLEXITY 2 9/11/2018 Jennifer Brown, PT GP 1          PT G-Codes  PT Professional Judgement Used?: Yes  Outcome Measure Options: Other Outcome Measure (SPADI: 28% disability)  Functional Limitation: Carrying, moving and handling objects  Carrying, Moving and Handling Objects Current Status (): At least 20 percent but less than 40 percent impaired, limited or restricted  Carrying, Moving and Handling Objects Goal Status (): At least 1 percent but less than 20 percent impaired, limited or restricted         Jennifer Brown, BRETT  9/11/2018

## 2018-09-20 ENCOUNTER — HOSPITAL ENCOUNTER (OUTPATIENT)
Dept: PHYSICAL THERAPY | Facility: HOSPITAL | Age: 78
Setting detail: THERAPIES SERIES
Discharge: HOME | End: 2018-09-20

## 2018-09-20 PROCEDURE — 97035 APP MDLTY 1+ULTRASOUND EA 15: CPT | Performed by: PHYSICAL THERAPIST

## 2018-09-20 PROCEDURE — 97140 MANUAL THERAPY 1/> REGIONS: CPT | Performed by: PHYSICAL THERAPIST

## 2018-09-20 NOTE — THERAPY TREATMENT NOTE
Outpatient Physical Therapy Ortho Treatment Note  [facility]     Patient Name: Calos Lam  : 1940  MRN: [id number]  Today's Date: 2018      Visit Date: 2018    Visit Dx:  No diagnosis found.    Patient Active Problem List   Diagnosis   • FIORELLA (obstructive sleep apnea) intolerent CPAP   • Sleep-related hypoventilation/hypoxia    • Frontotemporal dementia   • Essential hypertension   • Mixed hyperlipidemia   • Type 2 diabetes mellitus without complication (CMS/HCC)   • History of bladder cancer   • RLS (restless legs syndrome)   • Colon polyp   • PLMD (periodic limb movement disorder)        Past Medical History:   Diagnosis Date   • Essential hypertension    • GERD (gastroesophageal reflux disease)    • Impacted cerumen    • Impotence of organic origin    • Malaise and fatigue    • Mantoux: positive    • Memory loss    • Mixed hyperlipidemia    • Organic sleep apnea    • Pain in thoracic spine    • Primary malignant neoplasm of bladder (CMS/HCC) 2009   • Restless leg syndrome, uncontrolled    • Testicular hypofunction    • Vertigo    • Vitamin D deficiency         Past Surgical History:   Procedure Laterality Date   • APPENDECTOMY     • COLON RESECTION LEFT Left    • COLONOSCOPY  2012    normal   • EYE SURGERY      lasik. Dr. Stoery   • LASIK Bilateral    • ROOT CANAL     • TURP / TRANSURETHRAL INCISION / DRAINAGE PROSTATE  1980             PT Assessment/Plan     Row Name 18 1645          PT Assessment    Assessment Comments Patient reports significant pain with PROM flexion however is able to better tolerate abduction. He reports reduction in pain post US treatment however any AAROM overhead activity seems to flare up his biceps/deltoid.  -GEETA       User Key  (r) = Recorded By, (t) = Taken By, (c) = Cosigned By    Initials Name Provider Type    Jennifer Sawyer, PT Physical Therapist                Modalities     Row Name 18 4873              Ultrasound 94260    Location left deltoid/biceps  -      Duty Cycle 50  -KH      Frequency 1.0 MHz  -KH      Intensity - Wts/cm 1.5  -KH      81239 - PT Ultrasound Minutes 10  -KH        User Key  (r) = Recorded By, (t) = Taken By, (c) = Cosigned By    Initials Name Provider Type    Jennifer Sawyer, PT Physical Therapist                Exercises     Row Name 09/20/18 1600 09/20/18 1500          Subjective Comments    Subjective Comments My arm has really been bothering me, especially with the cane exercises  -KH  --        Subjective Pain    Able to rate subjective pain? yes  -KH  --     Pre-Treatment Pain Level 6  -KH  --     Post-Treatment Pain Level 4  -KH  --        Total Minutes    17658 - PT Manual Therapy Minutes  -- 30  -KH       User Key  (r) = Recorded By, (t) = Taken By, (c) = Cosigned By    Initials Name Provider Type    Jennifer Sawyer, PT Physical Therapist                        Manual Rx (last 36 hours)      Manual Treatments     Row Name 09/20/18 1500             Total Minutes    07369 - PT Manual Therapy Minutes 30  -KH         Manual Rx 1    Manual Rx 1 Location Left shoulder  -KH      Manual Rx 1 Type PROM flex/Abd  -KH      Manual Rx 1 Duration 20 min  -KH         Manual Rx 2    Manual Rx 2 Location Left shoulder  -KH      Manual Rx 2 Type A-P joint mobs, Sup-Inf  -KH      Manual Rx 2 Duration 10 min  -KH        User Key  (r) = Recorded By, (t) = Taken By, (c) = Cosigned By    Initials Name Provider Type    Jennifer Sawyer, PT Physical Therapist                             Time Calculation:   Start Time: 1600  Stop Time: 1640  Time Calculation (min): 40 min  Therapy Suggested Charges     Code   Minutes Charges    02842 (CPT®) Hc Pt Neuromusc Re Education Ea 15 Min      52720 (CPT®) Hc Pt Ther Proc Ea 15 Min      39325 (CPT®) Hc Gait Training Ea 15 Min      14997 (CPT®) Hc Pt Therapeutic Act Ea 15 Min      58303 (CPT®) Hc Pt Manual Therapy Ea 15 Min 30 2    14169 (CPT®) Hc Pt Ther Massage- Per  15 Min      09854 (CPT®) Hc Pt Iontophoresis Ea 15 Min      29491 (CPT®) Hc Pt Elec Stim Ea-Per 15 Min      61623 (CPT®) Hc Pt Ultrasound Ea 15 Min 10 1    34613 (CPT®) Hc Pt Self Care/Mgmt/Train Ea 15 Min      19637 (CPT®) Hc Pt Prosthetic (S) Train Initial Encounter, Each 15 Min      29514 (CPT®) Hc Orthotic(S) Mgmt/Train Initial Encounter, Each 15min      31938 (CPT®) Hc Pt Aquatic Therapy Ea 15 Min      71941 (CPT®) Hc Pt Orthotic(S)/Prosthetic(S) Encounter, Each 15 Min      Total  40 3        Therapy Charges for Today     Code Description Service Date Service Provider Modifiers Qty    30783955689 HC PT MANUAL THERAPY EA 15 MIN 9/20/2018 Jennifer Brown, PT GP 2    90277881175 HC PT ULTRASOUND EA 15 MIN 9/20/2018 Jennifer Brown, PT GP 1                    Jennifer Brown, PT  9/20/2018

## 2018-09-25 ENCOUNTER — HOSPITAL ENCOUNTER (OUTPATIENT)
Dept: PHYSICAL THERAPY | Facility: HOSPITAL | Age: 78
Setting detail: THERAPIES SERIES
Discharge: HOME | End: 2018-09-25

## 2018-09-25 PROCEDURE — 97035 APP MDLTY 1+ULTRASOUND EA 15: CPT | Performed by: PHYSICAL THERAPIST

## 2018-09-25 PROCEDURE — 97140 MANUAL THERAPY 1/> REGIONS: CPT | Performed by: PHYSICAL THERAPIST

## 2018-09-25 NOTE — THERAPY TREATMENT NOTE
Outpatient Physical Therapy Ortho Treatment Note  [facility]     Patient Name: Calos Lam  : 1940  MRN: [id number]  Today's Date: 2018      Visit Date: 2018    Visit Dx:  No diagnosis found.    Patient Active Problem List   Diagnosis   • FIORELLA (obstructive sleep apnea) intolerent CPAP   • Sleep-related hypoventilation/hypoxia    • Frontotemporal dementia   • Essential hypertension   • Mixed hyperlipidemia   • Type 2 diabetes mellitus without complication (CMS/HCC)   • History of bladder cancer   • RLS (restless legs syndrome)   • Colon polyp   • PLMD (periodic limb movement disorder)        Past Medical History:   Diagnosis Date   • Essential hypertension    • GERD (gastroesophageal reflux disease)    • Impacted cerumen    • Impotence of organic origin    • Malaise and fatigue    • Mantoux: positive    • Memory loss    • Mixed hyperlipidemia    • Organic sleep apnea    • Pain in thoracic spine    • Primary malignant neoplasm of bladder (CMS/HCC) 2009   • Restless leg syndrome, uncontrolled    • Testicular hypofunction    • Vertigo    • Vitamin D deficiency         Past Surgical History:   Procedure Laterality Date   • APPENDECTOMY     • COLON RESECTION LEFT Left    • COLONOSCOPY  2012    normal   • EYE SURGERY      lasik. Dr. Storey   • LASIK Bilateral    • ROOT CANAL     • TURP / TRANSURETHRAL INCISION / DRAINAGE PROSTATE  1980                             PT Assessment/Plan     Row Name 18 1412          PT Assessment    Assessment Comments Tested for possible labral tear however it appears that the pain is coming from the biceps tendon. Better tolerance to PROM flexion but still painful at >90 degrees. Targeted biceps muscle with STM which patient reports felt tender  -GEETA       User Key  (r) = Recorded By, (t) = Taken By, (c) = Cosigned By    Initials Name Provider Type    Jennifer Sawyer, PT Physical Therapist                Modalities     Row Name  09/25/18 1300             Subjective Comments    Subjective Comments My arm is less painful at rest, some discomfort still at night and pain with shoulder movements  -KH         Subjective Pain    Able to rate subjective pain? yes  -KH      Pre-Treatment Pain Level 5  -KH      Post-Treatment Pain Level 4  -KH         Ultrasound 76039    Location left deltoid/biceps  -KH      Duty Cycle 20  -KH      Frequency 1.0 MHz  -KH      Intensity - Wts/cm 1.8  -KH      92566 - PT Ultrasound Minutes 10  -KH        User Key  (r) = Recorded By, (t) = Taken By, (c) = Cosigned By    Initials Name Provider Type    Jennifer Sawyer, PT Physical Therapist                Exercises     Row Name 09/25/18 1300             Subjective Comments    Subjective Comments My arm is less painful at rest, some discomfort still at night and pain with shoulder movements  -KH         Subjective Pain    Able to rate subjective pain? yes  -KH      Pre-Treatment Pain Level 5  -KH      Post-Treatment Pain Level 4  -KH         Total Minutes    04296 - PT Therapeutic Exercise Minutes 5  -KH      36416 - PT Manual Therapy Minutes 30  -KH         Exercise 1    Exercise Name 1 Left bicep curls eccentrically  -KH      Sets 1 3  -KH      Reps 1 10  -KH        User Key  (r) = Recorded By, (t) = Taken By, (c) = Cosigned By    Initials Name Provider Type    Jennifer Sawyer, PT Physical Therapist                        Manual Rx (last 36 hours)      Manual Treatments     Row Name 09/25/18 1300             Total Minutes    62119 - PT Manual Therapy Minutes 30  -KH         Manual Rx 1    Manual Rx 1 Location Left shoulder  -KH      Manual Rx 1 Type PROM flex/Abd  -KH      Manual Rx 1 Duration 20  -KH         Manual Rx 2    Manual Rx 2 Location Left biceps  -KH      Manual Rx 2 Type STM  -KH      Manual Rx 2 Duration 10 min  -KH        User Key  (r) = Recorded By, (t) = Taken By, (c) = Cosigned By    Initials Name Provider Type    Jennifer Sawyer, PT Physical Therapist                              Time Calculation:   Start Time: 1330  Stop Time: 1415  Time Calculation (min): 45 min  Therapy Suggested Charges     Code   Minutes Charges    60911 (CPT®) Hc Pt Neuromusc Re Education Ea 15 Min      20259 (CPT®) Hc Pt Ther Proc Ea 15 Min 5     02970 (CPT®) Hc Gait Training Ea 15 Min      32731 (CPT®) Hc Pt Therapeutic Act Ea 15 Min      15443 (CPT®) Hc Pt Manual Therapy Ea 15 Min 30 2    68318 (CPT®) Hc Pt Ther Massage- Per 15 Min      86049 (CPT®) Hc Pt Iontophoresis Ea 15 Min      69859 (CPT®) Hc Pt Elec Stim Ea-Per 15 Min      13384 (CPT®) Hc Pt Ultrasound Ea 15 Min 10 1    23150 (CPT®) Hc Pt Self Care/Mgmt/Train Ea 15 Min      92161 (CPT®) Hc Pt Prosthetic (S) Train Initial Encounter, Each 15 Min      87940 (CPT®) Hc Orthotic(S) Mgmt/Train Initial Encounter, Each 15min      73494 (CPT®) Hc Pt Aquatic Therapy Ea 15 Min      09123 (CPT®) Hc Pt Orthotic(S)/Prosthetic(S) Encounter, Each 15 Min      Total  45 3        Therapy Charges for Today     Code Description Service Date Service Provider Modifiers Qty    93966664681 HC PT MANUAL THERAPY EA 15 MIN 9/25/2018 Jennifer Brown, PT GP 2    38518364332 HC PT ULTRASOUND EA 15 MIN 9/25/2018 Jennifer Brown, PT GP 1                    Jennifer Brown, PT  9/25/2018

## 2018-09-25 NOTE — THERAPY TREATMENT NOTE
Outpatient Physical Therapy Ortho Treatment Note  [facility]     Patient Name: Calos Lam  : 1940  MRN: [id number]  Today's Date: 2018      Visit Date: 2018    Visit Dx:  No diagnosis found.    Patient Active Problem List   Diagnosis   • FIORELLA (obstructive sleep apnea) intolerent CPAP   • Sleep-related hypoventilation/hypoxia    • Frontotemporal dementia   • Essential hypertension   • Mixed hyperlipidemia   • Type 2 diabetes mellitus without complication (CMS/HCC)   • History of bladder cancer   • RLS (restless legs syndrome)   • Colon polyp   • PLMD (periodic limb movement disorder)        Past Medical History:   Diagnosis Date   • Essential hypertension    • GERD (gastroesophageal reflux disease)    • Impacted cerumen    • Impotence of organic origin    • Malaise and fatigue    • Mantoux: positive    • Memory loss    • Mixed hyperlipidemia    • Organic sleep apnea    • Pain in thoracic spine    • Primary malignant neoplasm of bladder (CMS/HCC) 2009   • Restless leg syndrome, uncontrolled    • Testicular hypofunction    • Vertigo    • Vitamin D deficiency         Past Surgical History:   Procedure Laterality Date   • APPENDECTOMY     • COLON RESECTION LEFT Left    • COLONOSCOPY  2012    normal   • EYE SURGERY      lasik. Dr. Storey   • LASIK Bilateral    • ROOT CANAL     • TURP / TRANSURETHRAL INCISION / DRAINAGE PROSTATE  1980                             PT Assessment/Plan     Row Name 18 1412          PT Assessment    Assessment Comments Tested for possible labral tear however it appears that the pain is coming from the biceps tendon. Better tolerance to PROM flexion but still painful at >90 degrees. STM targets biceps muscle which patient reports felt tender  -GEETA       User Key  (r) = Recorded By, (t) = Taken By, (c) = Cosigned By    Initials Name Provider Type    Jennifer Sawyer, PT Physical Therapist                Modalities     Row Name 18  1300             Subjective Comments    Subjective Comments My arm is less painful at rest, some discomfort still at night and pain with shoulder movements  -KH         Subjective Pain    Able to rate subjective pain? yes  -KH      Pre-Treatment Pain Level 5  -KH      Post-Treatment Pain Level 4  -KH         Ultrasound 93364    Location left deltoid/biceps  -KH      Duty Cycle 20  -KH      Frequency 1.0 MHz  -KH      Intensity - Wts/cm 1.8  -KH      85059 - PT Ultrasound Minutes 10  -KH        User Key  (r) = Recorded By, (t) = Taken By, (c) = Cosigned By    Initials Name Provider Type    Jennifer Sawyer, PT Physical Therapist                Exercises     Row Name 09/25/18 1300             Subjective Comments    Subjective Comments My arm is less painful at rest, some discomfort still at night and pain with shoulder movements  -KH         Subjective Pain    Able to rate subjective pain? yes  -KH      Pre-Treatment Pain Level 5  -KH      Post-Treatment Pain Level 4  -KH         Total Minutes    38393 - PT Therapeutic Exercise Minutes 5  -KH      39322 - PT Manual Therapy Minutes 30  -KH         Exercise 1    Exercise Name 1 Left bicep curls eccentrically  -KH      Sets 1 3  -KH      Reps 1 10  -KH        User Key  (r) = Recorded By, (t) = Taken By, (c) = Cosigned By    Initials Name Provider Type    Jennifer Sawyer, PT Physical Therapist                        Manual Rx (last 36 hours)      Manual Treatments     Row Name 09/25/18 1300             Total Minutes    01216 - PT Manual Therapy Minutes 30  -KH         Manual Rx 1    Manual Rx 1 Location Left shoulder  -KH      Manual Rx 1 Type PROM flex/Abd  -KH      Manual Rx 1 Duration 20  -KH         Manual Rx 2    Manual Rx 2 Location Left biceps  -KH      Manual Rx 2 Type STM  -KH      Manual Rx 2 Duration 10 min  -KH        User Key  (r) = Recorded By, (t) = Taken By, (c) = Cosigned By    Initials Name Provider Type    Jennifer Sawyer, PT Physical Therapist                              Time Calculation:   Start Time: 1330  Stop Time: 1415  Time Calculation (min): 45 min  Therapy Suggested Charges     Code   Minutes Charges    95302 (CPT®) Hc Pt Neuromusc Re Education Ea 15 Min      37842 (CPT®) Hc Pt Ther Proc Ea 15 Min 5     68391 (CPT®) Hc Gait Training Ea 15 Min      77642 (CPT®) Hc Pt Therapeutic Act Ea 15 Min      29772 (CPT®) Hc Pt Manual Therapy Ea 15 Min 30 2    02920 (CPT®) Hc Pt Ther Massage- Per 15 Min      92343 (CPT®) Hc Pt Iontophoresis Ea 15 Min      30850 (CPT®) Hc Pt Elec Stim Ea-Per 15 Min      34920 (CPT®) Hc Pt Ultrasound Ea 15 Min 10 1    56216 (CPT®) Hc Pt Self Care/Mgmt/Train Ea 15 Min      70440 (CPT®) Hc Pt Prosthetic (S) Train Initial Encounter, Each 15 Min      99543 (CPT®) Hc Orthotic(S) Mgmt/Train Initial Encounter, Each 15min      35741 (CPT®) Hc Pt Aquatic Therapy Ea 15 Min      07594 (CPT®) Hc Pt Orthotic(S)/Prosthetic(S) Encounter, Each 15 Min      Total  45 3        Therapy Charges for Today     Code Description Service Date Service Provider Modifiers Qty    56691409779 HC PT MANUAL THERAPY EA 15 MIN 9/25/2018 Jennifer Brown, PT GP 2    95157073632 HC PT ULTRASOUND EA 15 MIN 9/25/2018 Jennifer Brown, PT GP 1                    Jennifer Brown, PT  9/25/2018

## 2018-10-02 ENCOUNTER — HOSPITAL ENCOUNTER (OUTPATIENT)
Dept: PHYSICAL THERAPY | Facility: HOSPITAL | Age: 78
Setting detail: THERAPIES SERIES
Discharge: HOME | End: 2018-10-02

## 2018-10-02 DIAGNOSIS — M79.602 LEFT ARM PAIN: ICD-10-CM

## 2018-10-02 DIAGNOSIS — M77.8 LEFT SHOULDER TENDONITIS: Primary | ICD-10-CM

## 2018-10-02 PROCEDURE — 97140 MANUAL THERAPY 1/> REGIONS: CPT | Performed by: PHYSICAL THERAPIST

## 2018-10-02 PROCEDURE — 97035 APP MDLTY 1+ULTRASOUND EA 15: CPT | Performed by: PHYSICAL THERAPIST

## 2018-10-02 NOTE — THERAPY TREATMENT NOTE
Outpatient Physical Therapy Ortho Treatment Note  [facility]     Patient Name: Calos Lam  : 1940  MRN: [id number]  Today's Date: 10/2/2018      Visit Date: 10/02/2018    Visit Dx:    ICD-10-CM ICD-9-CM   1. Left shoulder tendonitis M75.82 726.10   2. Left arm pain M79.602 729.5       Patient Active Problem List   Diagnosis   • FIORELLA (obstructive sleep apnea) intolerent CPAP   • Sleep-related hypoventilation/hypoxia    • Frontotemporal dementia   • Essential hypertension   • Mixed hyperlipidemia   • Type 2 diabetes mellitus without complication (CMS/[facility])   • History of bladder cancer   • RLS (restless legs syndrome)   • Colon polyp   • PLMD (periodic limb movement disorder)        Past Medical History:   Diagnosis Date   • Essential hypertension    • GERD (gastroesophageal reflux disease)    • Impacted cerumen    • Impotence of organic origin    • Malaise and fatigue    • Mantoux: positive    • Memory loss    • Mixed hyperlipidemia    • Organic sleep apnea    • Pain in thoracic spine    • Primary malignant neoplasm of bladder (CMS/HCC) 2009   • Restless leg syndrome, uncontrolled    • Testicular hypofunction    • Vertigo    • Vitamin D deficiency         Past Surgical History:   Procedure Laterality Date   • APPENDECTOMY     • COLON RESECTION LEFT Left    • COLONOSCOPY  2012    normal   • EYE SURGERY      lasik. Dr. Storey   • LASIK Bilateral    • ROOT CANAL     • TURP / TRANSURETHRAL INCISION / DRAINAGE PROSTATE  1980                             PT Assessment/Plan     Row Name 10/02/18 1412          PT Assessment    Assessment Comments Patient seemed to tolerate PROM better today but still had severe pain at 90 degrees of flexion. Once past 90 it appears the rigidness improves. AROM is still severely painful and patient is unable to tolerate even initiating shoulder flexion in supine.   -       User Key  (r) = Recorded By, (t) = Taken By, (c) = Cosigned By    Initials  Name Provider Type    Jennifer Sawyer, PT Physical Therapist                Modalities     Row Name 10/02/18 1300             Subjective Comments    Subjective Comments It's hard to tell if my pain is improving. I was lifting a lot of boxes this past weekend  -KH         Subjective Pain    Able to rate subjective pain? yes  -KH      Pre-Treatment Pain Level 5  -KH      Post-Treatment Pain Level 5  -KH         Ultrasound 87106    Location left deltoid/biceps  -KH      Duty Cycle 50  -KH      Frequency 1.0 MHz  -KH      Intensity - Wts/cm 1.5  -KH      60729 - PT Ultrasound Minutes 10  -KH        User Key  (r) = Recorded By, (t) = Taken By, (c) = Cosigned By    Initials Name Provider Type    Jennifer Sawyer, PT Physical Therapist                Exercises     Row Name 10/02/18 1300             Subjective Comments    Subjective Comments It's hard to tell if my pain is improving. I was lifting a lot of boxes this past weekend  -KH         Subjective Pain    Able to rate subjective pain? yes  -KH      Pre-Treatment Pain Level 5  -KH      Post-Treatment Pain Level 5  -KH         Total Minutes    82961 - PT Manual Therapy Minutes 30  -KH        User Key  (r) = Recorded By, (t) = Taken By, (c) = Cosigned By    Initials Name Provider Type    Jennifer Sawyer, PT Physical Therapist                        Manual Rx (last 36 hours)      Manual Treatments     Row Name 10/02/18 1300             Total Minutes    93916 - PT Manual Therapy Minutes 30  -KH         Manual Rx 1    Manual Rx 1 Location Left shoulder  -KH      Manual Rx 1 Type PROM flex/Abd  -KH      Manual Rx 1 Duration 25  -KH         Manual Rx 2    Manual Rx 2 Location Left biceps  -KH      Manual Rx 2 Type STM  -KH      Manual Rx 2 Duration 5 min  -KH        User Key  (r) = Recorded By, (t) = Taken By, (c) = Cosigned By    Initials Name Provider Type    Jennifer Sawyer, PT Physical Therapist                             Time Calculation:   Start Time: 1330  Stop Time:  1410  Time Calculation (min): 40 min  Therapy Suggested Charges     Code   Minutes Charges    92870 (CPT®) Hc Pt Neuromusc Re Education Ea 15 Min      82687 (CPT®) Hc Pt Ther Proc Ea 15 Min      92407 (CPT®) Hc Gait Training Ea 15 Min      22141 (CPT®) Hc Pt Therapeutic Act Ea 15 Min      93038 (CPT®) Hc Pt Manual Therapy Ea 15 Min 30 2    85988 (CPT®) Hc Pt Ther Massage- Per 15 Min      56992 (CPT®) Hc Pt Iontophoresis Ea 15 Min      40317 (CPT®) Hc Pt Elec Stim Ea-Per 15 Min      08716 (CPT®) Hc Pt Ultrasound Ea 15 Min 10 1    49573 (CPT®) Hc Pt Self Care/Mgmt/Train Ea 15 Min      83838 (CPT®) Hc Pt Prosthetic (S) Train Initial Encounter, Each 15 Min      25936 (CPT®) Hc Orthotic(S) Mgmt/Train Initial Encounter, Each 15min      05986 (CPT®) Hc Pt Aquatic Therapy Ea 15 Min      54281 (CPT®) Hc Pt Orthotic(S)/Prosthetic(S) Encounter, Each 15 Min       (CPT®) Hc Pt Electrical Stim Unattended      Total  40 3        Therapy Charges for Today     Code Description Service Date Service Provider Modifiers Qty    09496394292 HC PT MANUAL THERAPY EA 15 MIN 10/2/2018 Jennifer Brown, PT GP 2    47444195297 HC PT ULTRASOUND EA 15 MIN 10/2/2018 Jennifer Brown, PT GP 1                    Jennifer Brown, PT  10/2/2018

## 2018-10-04 ENCOUNTER — HOSPITAL ENCOUNTER (OUTPATIENT)
Dept: PHYSICAL THERAPY | Facility: HOSPITAL | Age: 78
Setting detail: THERAPIES SERIES
Discharge: HOME | End: 2018-10-04

## 2018-10-04 DIAGNOSIS — M79.602 LEFT ARM PAIN: ICD-10-CM

## 2018-10-04 DIAGNOSIS — M77.8 LEFT SHOULDER TENDONITIS: Primary | ICD-10-CM

## 2018-10-04 PROCEDURE — 97140 MANUAL THERAPY 1/> REGIONS: CPT | Performed by: PHYSICAL THERAPIST

## 2018-10-04 PROCEDURE — 97035 APP MDLTY 1+ULTRASOUND EA 15: CPT | Performed by: PHYSICAL THERAPIST

## 2018-10-04 NOTE — THERAPY TREATMENT NOTE
Outpatient Physical Therapy Ortho Treatment Note  [facility]     Patient Name: Calos Lam  : 1940  MRN: [id number]  Today's Date: 10/4/2018      Visit Date: 10/04/2018    Visit Dx:    ICD-10-CM ICD-9-CM   1. Left shoulder tendonitis M75.82 726.10   2. Left arm pain M79.602 729.5       Patient Active Problem List   Diagnosis   • FIORELLA (obstructive sleep apnea) intolerent CPAP   • Sleep-related hypoventilation/hypoxia    • Frontotemporal dementia   • Essential hypertension   • Mixed hyperlipidemia   • Type 2 diabetes mellitus without complication (CMS/[facility])   • History of bladder cancer   • RLS (restless legs syndrome)   • Colon polyp   • PLMD (periodic limb movement disorder)        Past Medical History:   Diagnosis Date   • Essential hypertension    • GERD (gastroesophageal reflux disease)    • Impacted cerumen    • Impotence of organic origin    • Malaise and fatigue    • Mantoux: positive    • Memory loss    • Mixed hyperlipidemia    • Organic sleep apnea    • Pain in thoracic spine    • Primary malignant neoplasm of bladder (CMS/HCC) 2009   • Restless leg syndrome, uncontrolled    • Testicular hypofunction    • Vertigo    • Vitamin D deficiency         Past Surgical History:   Procedure Laterality Date   • APPENDECTOMY     • COLON RESECTION LEFT Left    • COLONOSCOPY  2012    normal   • EYE SURGERY      lasik. Dr. Storey   • LASIK Bilateral    • ROOT CANAL     • TURP / TRANSURETHRAL INCISION / DRAINAGE PROSTATE  1980                             PT Assessment/Plan     Row Name 10/04/18 1621          PT Assessment    Assessment Comments Patient appears to have more pain in his deltoid with PROM vs the biceps tendon. Still painful at 90 degrees and more trouble with PROM in flexion vs scapular plane  -GEETA       User Key  (r) = Recorded By, (t) = Taken By, (c) = Cosigned By    Initials Name Provider Type    Jennifer Sawyer, PT Physical Therapist                Modalities      Row Name 10/04/18 1600             Ultrasound 07367    Location left deltoid/biceps  -KH      Duty Cycle 50  -KH      Frequency 1.0 MHz  -KH      Intensity - Wts/cm 1.5  -KH      36539 - PT Ultrasound Minutes 10  -KH        User Key  (r) = Recorded By, (t) = Taken By, (c) = Cosigned By    Initials Name Provider Type    Jennifer Sawyer, PT Physical Therapist                Exercises     Row Name 10/04/18 1600 10/04/18 1500          Subjective Comments    Subjective Comments My motion felt a lot better after our last session and my pain has been more intermittent. It still hurts pretty badly when I try to lift anything  -KH  --        Subjective Pain    Able to rate subjective pain? yes  -KH  --     Pre-Treatment Pain Level 5  -KH  --     Post-Treatment Pain Level 5  -KH  --        Total Minutes    70247 - PT Manual Therapy Minutes  -- 30  -KH       User Key  (r) = Recorded By, (t) = Taken By, (c) = Cosigned By    Initials Name Provider Type    Jennifer Sawyer, PT Physical Therapist                        Manual Rx (last 36 hours)      Manual Treatments     Row Name 10/04/18 1500             Total Minutes    85994 - PT Manual Therapy Minutes 30  -KH         Manual Rx 1    Manual Rx 1 Location Left shoulder  -KH      Manual Rx 1 Type PROM flex/Abd  -KH      Manual Rx 1 Duration 25  -KH         Manual Rx 2    Manual Rx 2 Location Left biceps  -KH      Manual Rx 2 Type STM  -KH      Manual Rx 2 Duration 5 min  -KH        User Key  (r) = Recorded By, (t) = Taken By, (c) = Cosigned By    Initials Name Provider Type    Jennifer Sawyer, PT Physical Therapist                             Time Calculation:   Start Time: 1330  Stop Time: 1410  Time Calculation (min): 40 min  Therapy Suggested Charges     Code   Minutes Charges    15846 (CPT®) Hc Pt Neuromusc Re Education Ea 15 Min      48950 (CPT®) Hc Pt Ther Proc Ea 15 Min      30620 (CPT®) Hc Gait Training Ea 15 Min      74793 (CPT®) Hc Pt Therapeutic Act Ea 15 Min      35125  (CPT®) Hc Pt Manual Therapy Ea 15 Min 30 2    40924 (CPT®) Hc Pt Ther Massage- Per 15 Min      42873 (CPT®) Hc Pt Iontophoresis Ea 15 Min      93490 (CPT®) Hc Pt Elec Stim Ea-Per 15 Min      68098 (CPT®) Hc Pt Ultrasound Ea 15 Min 10 1    13064 (CPT®) Hc Pt Self Care/Mgmt/Train Ea 15 Min      55044 (CPT®) Hc Pt Prosthetic (S) Train Initial Encounter, Each 15 Min      32996 (CPT®) Hc Orthotic(S) Mgmt/Train Initial Encounter, Each 15min      56938 (CPT®) Hc Pt Aquatic Therapy Ea 15 Min      98928 (CPT®) Hc Pt Orthotic(S)/Prosthetic(S) Encounter, Each 15 Min       (CPT®) Hc Pt Electrical Stim Unattended      Total  40 3        Therapy Charges for Today     Code Description Service Date Service Provider Modifiers Qty    01722114487 HC PT MANUAL THERAPY EA 15 MIN 10/4/2018 Jennifer Brown, PT GP 2    46173052674 HC PT ULTRASOUND EA 15 MIN 10/4/2018 Jennifer Brown, PT GP 1                    Jennifer Brown, PT  10/4/2018

## 2018-10-08 RX ORDER — PRAVASTATIN SODIUM 40 MG
TABLET ORAL
Qty: 90 TABLET | Refills: 2 | Status: SHIPPED | OUTPATIENT
Start: 2018-10-08 | End: 2019-07-10 | Stop reason: SDUPTHER

## 2018-10-09 ENCOUNTER — HOSPITAL ENCOUNTER (OUTPATIENT)
Dept: PHYSICAL THERAPY | Facility: HOSPITAL | Age: 78
Setting detail: THERAPIES SERIES
Discharge: HOME | End: 2018-10-09

## 2018-10-09 DIAGNOSIS — M77.8 LEFT SHOULDER TENDONITIS: Primary | ICD-10-CM

## 2018-10-09 DIAGNOSIS — M79.602 LEFT ARM PAIN: ICD-10-CM

## 2018-10-09 PROCEDURE — 97110 THERAPEUTIC EXERCISES: CPT | Performed by: PHYSICAL THERAPIST

## 2018-10-09 PROCEDURE — 97140 MANUAL THERAPY 1/> REGIONS: CPT | Performed by: PHYSICAL THERAPIST

## 2018-10-09 PROCEDURE — 97035 APP MDLTY 1+ULTRASOUND EA 15: CPT | Performed by: PHYSICAL THERAPIST

## 2018-10-09 NOTE — THERAPY TREATMENT NOTE
Outpatient Physical Therapy Ortho Treatment Note  [facility]     Patient Name: Calos Lam  : 1940  MRN: [id number]  Today's Date: 10/9/2018      Visit Date: 10/09/2018    Visit Dx:    ICD-10-CM ICD-9-CM   1. Left shoulder tendonitis M75.82 726.10   2. Left arm pain M79.602 729.5       Patient Active Problem List   Diagnosis   • FIORELLA (obstructive sleep apnea) intolerent CPAP   • Sleep-related hypoventilation/hypoxia    • Frontotemporal dementia   • Essential hypertension   • Mixed hyperlipidemia   • Type 2 diabetes mellitus without complication (CMS/[facility])   • History of bladder cancer   • RLS (restless legs syndrome)   • Colon polyp   • PLMD (periodic limb movement disorder)        Past Medical History:   Diagnosis Date   • Essential hypertension    • GERD (gastroesophageal reflux disease)    • Impacted cerumen    • Impotence of organic origin    • Malaise and fatigue    • Mantoux: positive    • Memory loss    • Mixed hyperlipidemia    • Organic sleep apnea    • Pain in thoracic spine    • Primary malignant neoplasm of bladder (CMS/HCC) 2009   • Restless leg syndrome, uncontrolled    • Testicular hypofunction    • Vertigo    • Vitamin D deficiency         Past Surgical History:   Procedure Laterality Date   • APPENDECTOMY     • COLON RESECTION LEFT Left    • COLONOSCOPY  2012    normal   • EYE SURGERY      lasik. Dr. Storey   • LASIK Bilateral    • ROOT CANAL     • TURP / TRANSURETHRAL INCISION / DRAINAGE PROSTATE  1980                             PT Assessment/Plan     Row Name 10/09/18 1412          PT Assessment    Assessment Comments Added scapular strengthening and no reports of pain. Patient did require vc's to perform exercises slowly to work on muscle stability/control. Patient able to lift arm to 90 degrees abduction actively but unable to lift arm into flexion without immediate pain.   -       User Key  (r) = Recorded By, (t) = Taken By, (c) = Cosigned By     Initials Name Provider Type    Jennifer Sawyer, PT Physical Therapist                Modalities     Row Name 10/09/18 1300             Subjective Comments    Subjective Comments I lifted a lot of boxes this weekend and my pain was no worse but still present  -KH         Subjective Pain    Able to rate subjective pain? yes  -KH      Pre-Treatment Pain Level 5  -KH      Post-Treatment Pain Level 5  -KH         Ultrasound 20569    Location left deltoid/biceps  -KH      Duty Cycle 50  -KH      Frequency 1.0 MHz  -KH      Intensity - Wts/cm 1.5  -KH      43624 - PT Ultrasound Minutes 10  -KH        User Key  (r) = Recorded By, (t) = Taken By, (c) = Cosigned By    Initials Name Provider Type    Jennifer Sawyer, PT Physical Therapist                Exercises     Row Name 10/09/18 1300             Subjective Comments    Subjective Comments I lifted a lot of boxes this weekend and my pain was no worse but still present  -KH         Subjective Pain    Able to rate subjective pain? yes  -KH      Pre-Treatment Pain Level 5  -KH      Post-Treatment Pain Level 5  -KH         Total Minutes    67948 - PT Therapeutic Exercise Minutes 15  -KH      30393 - PT Manual Therapy Minutes 15  -KH         Exercise 1    Exercise Name 1 Rows-12.5 lbs  -KH      Sets 1 3  -KH      Reps 1 15  -KH         Exercise 2    Exercise Name 2 Shoulder extension-12.5 lbs  -KH      Sets 2 3  -KH      Reps 2 15  -KH        User Key  (r) = Recorded By, (t) = Taken By, (c) = Cosigned By    Initials Name Provider Type    Jennifer Sawyer, PT Physical Therapist                        Manual Rx (last 36 hours)      Manual Treatments     Row Name 10/09/18 1300             Total Minutes    15747 - PT Manual Therapy Minutes 15  -KH         Manual Rx 1    Manual Rx 1 Location Left shoulder  -KH      Manual Rx 1 Type PROM flex/Abd  -KH      Manual Rx 1 Duration 10  -KH         Manual Rx 2    Manual Rx 2 Location Left biceps  -KH      Manual Rx 2 Type STM  -KH       Manual Rx 2 Duration 5 min  -GEETA        User Key  (r) = Recorded By, (t) = Taken By, (c) = Cosigned By    Initials Name Provider Type    Jennifer Sawyer, PT Physical Therapist                             Time Calculation:   Start Time: 1330  Stop Time: 1410  Time Calculation (min): 40 min  Therapy Suggested Charges     Code   Minutes Charges    72678 (CPT®) Hc Pt Neuromusc Re Education Ea 15 Min      59817 (CPT®) Hc Pt Ther Proc Ea 15 Min 15 1    03737 (CPT®) Hc Gait Training Ea 15 Min      95236 (CPT®) Hc Pt Therapeutic Act Ea 15 Min      11245 (CPT®) Hc Pt Manual Therapy Ea 15 Min 15 1    35781 (CPT®) Hc Pt Ther Massage- Per 15 Min      51742 (CPT®) Hc Pt Iontophoresis Ea 15 Min      70447 (CPT®) Hc Pt Elec Stim Ea-Per 15 Min      88478 (CPT®) Hc Pt Ultrasound Ea 15 Min 10 1    65000 (CPT®) Hc Pt Self Care/Mgmt/Train Ea 15 Min      05046 (CPT®) Hc Pt Prosthetic (S) Train Initial Encounter, Each 15 Min      60655 (CPT®) Hc Orthotic(S) Mgmt/Train Initial Encounter, Each 15min      03123 (CPT®) Hc Pt Aquatic Therapy Ea 15 Min      54926 (CPT®) Hc Pt Orthotic(S)/Prosthetic(S) Encounter, Each 15 Min       (CPT®) Hc Pt Electrical Stim Unattended      Total  40 3        Therapy Charges for Today     Code Description Service Date Service Provider Modifiers Qty    95577140810 HC PT THER PROC EA 15 MIN 10/9/2018 Jennifer Brown, PT GP 1    52429349249 HC PT MANUAL THERAPY EA 15 MIN 10/9/2018 Jennifer Brown, PT GP 1    10643135895 HC PT ULTRASOUND EA 15 MIN 10/9/2018 Jennifer Brown, PT GP 1                    Jennifer Brown, PT  10/9/2018

## 2018-10-11 ENCOUNTER — HOSPITAL ENCOUNTER (OUTPATIENT)
Dept: PHYSICAL THERAPY | Facility: HOSPITAL | Age: 78
Setting detail: THERAPIES SERIES
Discharge: HOME | End: 2018-10-11

## 2018-10-11 DIAGNOSIS — M79.602 LEFT ARM PAIN: ICD-10-CM

## 2018-10-11 DIAGNOSIS — M77.8 LEFT SHOULDER TENDONITIS: Primary | ICD-10-CM

## 2018-10-11 PROCEDURE — 97140 MANUAL THERAPY 1/> REGIONS: CPT | Performed by: PHYSICAL THERAPIST

## 2018-10-11 PROCEDURE — G8984 CARRY CURRENT STATUS: HCPCS | Performed by: PHYSICAL THERAPIST

## 2018-10-11 PROCEDURE — 97035 APP MDLTY 1+ULTRASOUND EA 15: CPT | Performed by: PHYSICAL THERAPIST

## 2018-10-11 PROCEDURE — G8985 CARRY GOAL STATUS: HCPCS | Performed by: PHYSICAL THERAPIST

## 2018-10-11 NOTE — THERAPY PROGRESS REPORT/RE-CERT
Outpatient Physical Therapy Ortho Progress Note  [facility]     Patient Name: Calos Lam  : 1940  MRN: [id number]  Today's Date: 10/11/2018      Visit Date: 10/11/2018    Visit Dx:    ICD-10-CM ICD-9-CM   1. Left shoulder tendonitis M75.82 726.10   2. Left arm pain M79.602 729.5       Patient Active Problem List   Diagnosis   • FIORELLA (obstructive sleep apnea) intolerent CPAP   • Sleep-related hypoventilation/hypoxia    • Frontotemporal dementia   • Essential hypertension   • Mixed hyperlipidemia   • Type 2 diabetes mellitus without complication (CMS/[facility])   • History of bladder cancer   • RLS (restless legs syndrome)   • Colon polyp   • PLMD (periodic limb movement disorder)        Past Medical History:   Diagnosis Date   • Essential hypertension    • GERD (gastroesophageal reflux disease)    • Impacted cerumen    • Impotence of organic origin    • Malaise and fatigue    • Mantoux: positive    • Memory loss    • Mixed hyperlipidemia    • Organic sleep apnea    • Pain in thoracic spine    • Primary malignant neoplasm of bladder (CMS/HCC) 2009   • Restless leg syndrome, uncontrolled    • Testicular hypofunction    • Vertigo    • Vitamin D deficiency         Past Surgical History:   Procedure Laterality Date   • APPENDECTOMY     • COLON RESECTION LEFT Left    • COLONOSCOPY  2012    normal   • EYE SURGERY      lasik. Dr. Storey   • LASIK Bilateral    • ROOT CANAL     • TURP / TRANSURETHRAL INCISION / DRAINAGE PROSTATE  1980             PT Ortho     Row Name 10/11/18 1300       Subjective Comments    Subjective Comments My pain is still the same. I've been lifting boxes at my sisters house but we should be done with the move by this weekend  -KH       Subjective Pain    Able to rate subjective pain? yes  -KH    Pre-Treatment Pain Level 5  -KH    Post-Treatment Pain Level 5  -KH       Shoulder Impingement/Rotator Cuff Special Tests    Hare-Austin Test (RC Lesion vs. Bursitis)  Left:;Positive  -KH    Empty Can Test (RC Lesion) Left:;Positive  -KH    Speed's Test (LH of Biceps Lesion) Left:;Positive  -KH       Biceps/Labral Special Tests    Dodge's Test (Labral Test) Left:;Positive  -KH    Yergason's Test (Biceps Tendinopathy vs. Labral Tear) Left:;Positive  -KH       Left Upper Ext    Lt Shoulder Abduction AROM 92  -KH    Lt Shoulder Flexion AROM 88  -KH       MMT Left Upper Ext    Lt Shoulder Flexion MMT, Gross Movement (3+/5) fair plus  -KH    Lt Shoulder Extension MMT, Gross Movement (4-/5) good minus  -KH    Lt Shoulder ABduction MMT, Gross Movement (3+/5) fair plus  -KH    Lt Shoulder Internal Rotation MMT, Gross Movement (4-/5) good minus  -KH    Lt Shoulder External Rotation MMT, Gross Movement (3/5) fair  -KH      User Key  (r) = Recorded By, (t) = Taken By, (c) = Cosigned By    Initials Name Provider Type    Jennifer Sawyer, PT Physical Therapist                            PT Assessment/Plan     Row Name 10/11/18 5472          PT Assessment    Functional Limitations Limitations in functional capacity and performance;Performance in leisure activities;Performance in self-care ADL;Performance in sport activities;Limitations in community activities;Limitation in home management;Decreased safety during functional activities  -     Impairments Muscle strength;Pain;Posture;Range of motion  -     Assessment Comments Patient has received 6 sessions of skilled PT for left arm pain. He tested negative at initial eval for a rotator cuff tear, biceps lesion, or labral tear. Patient's pain and shoulder issues have worsened which may be due to him frequently lifting boxes at his sister's house. His AROM is more limited/painful and special tests performed today indicate potental issues with his labrum, rotator cuff, and biceps muscle. Recommend an MRI and return MD for further consultation since conservative measures have failed.   -     Please refer to paper survey for additional  self-reported information Yes  -KH     Rehab Potential Good  -KH     Patient/caregiver participated in establishment of treatment plan and goals Yes  -GEETA     Patient would benefit from skilled therapy intervention Yes  -KH        PT Plan    PT Frequency Other (comment)   hold PT until patient sees MD ALBERTO     Planned CPT's? PT THER ACT EA 15 MIN: 20421;PT ELECTRICAL STIM UNATTEND: ;PT ELECTRICAL STIM ATTD EA 15 MIN: 97689;PT HOT/COLD PACK WC NONMCARE: 04485;PT MANUAL THERAPY EA 15 MIN: 34858;PT NEUROMUSC RE-EDUCATION EA 15 MIN: 40727;PT RE-EVAL: 04102;PT SELF CARE/HOME MGMT/TRAIN EA 15: 23738;PT ULTRASOUND EA 15 MIN: 13861;PT IONTOPHORESIS EA 15 MIN: 52675;PT THER PROC EA 15 MIN: 48438;PT HOT OR COLD PACK TREAT MCARE;PT TRACTION CERVICAL: 59282  -GEETA     Physical Therapy Interventions (Optional Details) ROM (Range of Motion);strengthening;stretching;modalities  -GEETA     PT Plan Comments Hold PT until patient sees MD ALBERTO       User Key  (r) = Recorded By, (t) = Taken By, (c) = Cosigned By    Initials Name Provider Type    Jennifer Sawyer, PT Physical Therapist                Modalities     Row Name 10/11/18 1300             Ultrasound 90486    Location left deltoid/biceps  -GEETA      Duty Cycle 50  -      Frequency 1.0 MHz  -      Intensity - Wts/cm 1.5  -KH      71477 - PT Ultrasound Minutes 10  -KH        User Key  (r) = Recorded By, (t) = Taken By, (c) = Cosigned By    Initials Name Provider Type    Jennifer Sawyer, PT Physical Therapist                Exercises     Row Name 10/11/18 1300             Subjective Comments    Subjective Comments My pain is still the same. I've been lifting boxes at my sisters house but we should be done with the move by this weekend  -GEETA         Subjective Pain    Able to rate subjective pain? yes  -GEETA      Pre-Treatment Pain Level 5  -KH      Post-Treatment Pain Level 5  -KH         Total Minutes    01299 - PT Manual Therapy Minutes 15  -KH        User Key  (r) = Recorded By,  (t) = Taken By, (c) = Cosigned By    Initials Name Provider Type    Jennifer Sawyer, PT Physical Therapist                        Manual Rx (last 36 hours)      Manual Treatments     Row Name 10/11/18 1300             Total Minutes    02167 - PT Manual Therapy Minutes 15  -KH         Manual Rx 1    Manual Rx 1 Location Left shoulder  -KH      Manual Rx 1 Type PROM flex/Abd  -KH      Manual Rx 1 Duration 10  -KH         Manual Rx 2    Manual Rx 2 Location Left biceps  -      Manual Rx 2 Type STM  -KH      Manual Rx 2 Duration 5 min  -        User Key  (r) = Recorded By, (t) = Taken By, (c) = Cosigned By    Initials Name Provider Type    Jennifer Sawyer, PT Physical Therapist                PT OP Goals     Row Name 10/11/18 1400          PT Short Term Goals    STG 1 Patient will demonstrate safety and independence with initial hEP  -     STG 1 Progress Ongoing  -     STG 1 Progress Comments unable to perform initial HEP due to pain  -     STG 2 Patient will increase left shoulder flex/abduction to 155 degrees or greater to reduce pain with overhead activities  -     STG 2 Progress Ongoing  -     STG 3 Patient will increase left shoulder flex/abduction strength to 4/5 or greater to improve stability with lifting household items  -     STG 3 Progress Ongoing  -        Long Term Goals    LTG 1 Patient will increase left shoulder flex/abduction to 160 degrees or greater to reduce pain with overhead activities  -     LTG 1 Progress Ongoing  -     LTG 2 Patient will reduce level of perceived disability on the SPADI from 28% disability to </=20% disability in order to improve quality of life  -     LTG 2 Progress Ongoing  -     LTG 2 Progress Comments 30%   -     LTG 3 Patient will reduce peak pain level with overheard reaching from 6/10 to 4/10   -     LTG 3 Progress Ongoing  -       User Key  (r) = Recorded By, (t) = Taken By, (c) = Cosigned By    Initials Name Provider Type    GEETA Brown  Jennifer, PT Physical Therapist               Outcome Measure Options: Other Outcome Measure  Other Outcome Measure Tool Used  Other Outcome Measure Tool Comments: SPADI: 30% disability      Time Calculation:   Start Time: 1330  Stop Time: 1355  Time Calculation (min): 25 min  Therapy Suggested Charges     Code   Minutes Charges    08933 (CPT®) Hc Pt Neuromusc Re Education Ea 15 Min      50291 (CPT®) Hc Pt Ther Proc Ea 15 Min      99901 (CPT®) Hc Gait Training Ea 15 Min      16890 (CPT®) Hc Pt Therapeutic Act Ea 15 Min      95255 (CPT®) Hc Pt Manual Therapy Ea 15 Min 15 1    46907 (CPT®) Hc Pt Ther Massage- Per 15 Min      97775 (CPT®) Hc Pt Iontophoresis Ea 15 Min      57093 (CPT®) Hc Pt Elec Stim Ea-Per 15 Min      94029 (CPT®) Hc Pt Ultrasound Ea 15 Min 10 1    60037 (CPT®) Hc Pt Self Care/Mgmt/Train Ea 15 Min      35535 (CPT®) Hc Pt Prosthetic (S) Train Initial Encounter, Each 15 Min      95168 (CPT®) Hc Orthotic(S) Mgmt/Train Initial Encounter, Each 15min      80570 (CPT®) Hc Pt Aquatic Therapy Ea 15 Min      93261 (CPT®) Hc Pt Orthotic(S)/Prosthetic(S) Encounter, Each 15 Min       (CPT®) Hc Pt Electrical Stim Unattended      Total  25 2        Therapy Charges for Today     Code Description Service Date Service Provider Modifiers Qty    84116445818 HC PT ULTRASOUND EA 15 MIN 10/11/2018 Jennifer Brown, PT GP 1    40182205449 HC PT MANUAL THERAPY EA 15 MIN 10/11/2018 Jennifer Brown, PT GP 1    28697558304 HC PT CARRY MOV HAND OBJ CURRENT 10/11/2018 Jennifer Brown, PT GP,  1    42774771186 HC PT CARRY MOV HAND OBJ PROJECTED 10/11/2018 Jennifer Brown, PT GP, CI 1          PT G-Codes  PT Professional Judgement Used?: Yes  Outcome Measure Options: Other Outcome Measure  Functional Limitation: Carrying, moving and handling objects  Carrying, Moving and Handling Objects Current Status (): At least 20 percent but less than 40 percent impaired, limited or restricted  Carrying, Moving and Handling Objects Goal Status  (): At least 1 percent but less than 20 percent impaired, limited or restricted         Jennifer Brown, PT  10/11/2018

## 2018-10-12 ENCOUNTER — TELEPHONE (OUTPATIENT)
Dept: INTERNAL MEDICINE | Facility: CLINIC | Age: 78
End: 2018-10-12

## 2018-10-12 DIAGNOSIS — M25.512 ACUTE PAIN OF LEFT SHOULDER: Primary | ICD-10-CM

## 2018-10-16 ENCOUNTER — TELEPHONE (OUTPATIENT)
Dept: INTERNAL MEDICINE | Facility: CLINIC | Age: 78
End: 2018-10-16

## 2018-10-16 ENCOUNTER — APPOINTMENT (OUTPATIENT)
Dept: PHYSICAL THERAPY | Facility: HOSPITAL | Age: 78
End: 2018-10-16

## 2018-10-16 NOTE — TELEPHONE ENCOUNTER
Wife left message that order placed for 's MRI is incorrect.     States correct area to be imaged is in note from Physical Therapy. They did not have image & prefer to wait until order is corrected.    Thanks!  TW    LMTRC.  JUANITA    I d/w patient and changed.  JUANITA

## 2018-10-17 DIAGNOSIS — M89.8X2 PAIN OF LEFT HUMERUS: Primary | ICD-10-CM

## 2018-10-18 ENCOUNTER — APPOINTMENT (OUTPATIENT)
Dept: PHYSICAL THERAPY | Facility: HOSPITAL | Age: 78
End: 2018-10-18

## 2018-10-21 ENCOUNTER — HOSPITAL ENCOUNTER (OUTPATIENT)
Dept: MRI IMAGING | Facility: HOSPITAL | Age: 78
Discharge: HOME | End: 2018-10-21

## 2018-10-21 DIAGNOSIS — M25.512 ACUTE PAIN OF LEFT SHOULDER: ICD-10-CM

## 2018-10-22 ENCOUNTER — TELEPHONE (OUTPATIENT)
Dept: INTERNAL MEDICINE | Facility: CLINIC | Age: 78
End: 2018-10-22

## 2018-10-22 NOTE — TELEPHONE ENCOUNTER
Patient wife called today. She states her  went to have his MRI on Saturday. She states the wrong MRI was approved. She states it should be for his humerus not his shoulder. Can we get this correct and rescheduled for the patient. There are two orders in the the system one for each. DEXTER    I put in correct one and thought I told Lucrecia.  Ele, ask Lucrecia to cancer shoulder MRI.  I couldn't cancel because already scheduled.  Lucrecia can you call patient about this.  JUANITA

## 2018-10-25 ENCOUNTER — HOSPITAL ENCOUNTER (OUTPATIENT)
Dept: MRI IMAGING | Facility: HOSPITAL | Age: 78
Discharge: HOME | End: 2018-10-25
Admitting: INTERNAL MEDICINE

## 2018-10-25 ENCOUNTER — TELEPHONE (OUTPATIENT)
Dept: NEUROLOGY | Facility: CLINIC | Age: 78
End: 2018-10-25

## 2018-10-25 ENCOUNTER — HOSPITAL ENCOUNTER (OUTPATIENT)
Dept: MRI IMAGING | Facility: HOSPITAL | Age: 78
Discharge: HOME | End: 2018-10-25

## 2018-10-25 DIAGNOSIS — M89.8X2 PAIN OF LEFT HUMERUS: ICD-10-CM

## 2018-10-25 PROCEDURE — 73218 MRI UPPER EXTREMITY W/O DYE: CPT

## 2018-10-25 PROCEDURE — 73221 MRI JOINT UPR EXTREM W/O DYE: CPT

## 2018-10-25 RX ORDER — RIVASTIGMINE 13.3 MG/24H
1 PATCH, EXTENDED RELEASE TRANSDERMAL DAILY
Qty: 90 PATCH | Refills: 3 | Status: SHIPPED | OUTPATIENT
Start: 2018-10-25 | End: 2019-08-01 | Stop reason: SDUPTHER

## 2018-10-25 RX ORDER — NEBIVOLOL HYDROCHLORIDE 10 MG/1
TABLET ORAL
Qty: 90 TABLET | Refills: 2 | Status: SHIPPED | OUTPATIENT
Start: 2018-10-25 | End: 2019-06-19 | Stop reason: SDUPTHER

## 2018-10-25 NOTE — TELEPHONE ENCOUNTER
----- Message from Phyllis Abbott sent at 10/24/2018  1:22 PM EDT -----  Contact: [phone]  Patient needs a refill on his Exelon Patch. Please advise

## 2018-10-26 DIAGNOSIS — IMO0001 TEAR OF LEFT SUPRASPINATUS TENDON, INITIAL ENCOUNTER: Primary | ICD-10-CM

## 2018-11-09 DIAGNOSIS — M25.512 CHRONIC LEFT SHOULDER PAIN: Primary | ICD-10-CM

## 2018-11-09 DIAGNOSIS — G89.29 CHRONIC LEFT SHOULDER PAIN: Primary | ICD-10-CM

## 2018-11-20 RX ORDER — AMLODIPINE BESYLATE 10 MG/1
TABLET ORAL
Qty: 90 TABLET | Refills: 2 | Status: SHIPPED | OUTPATIENT
Start: 2018-11-20 | End: 2019-08-13 | Stop reason: SDUPTHER

## 2018-12-05 ENCOUNTER — OFFICE VISIT (OUTPATIENT)
Dept: SLEEP MEDICINE | Facility: HOSPITAL | Age: 78
End: 2018-12-05
Attending: INTERNAL MEDICINE

## 2018-12-05 VITALS
SYSTOLIC BLOOD PRESSURE: 137 MMHG | DIASTOLIC BLOOD PRESSURE: 64 MMHG | BODY MASS INDEX: 22.76 KG/M2 | WEIGHT: 159 LBS | HEART RATE: 49 BPM | HEIGHT: 70 IN

## 2018-12-05 DIAGNOSIS — G47.33 OSA (OBSTRUCTIVE SLEEP APNEA): Primary | ICD-10-CM

## 2018-12-05 DIAGNOSIS — G47.61 PLMD (PERIODIC LIMB MOVEMENT DISORDER): ICD-10-CM

## 2018-12-05 PROCEDURE — G0463 HOSPITAL OUTPT CLINIC VISIT: HCPCS

## 2018-12-05 PROCEDURE — 99213 OFFICE O/P EST LOW 20 MIN: CPT | Performed by: INTERNAL MEDICINE

## 2018-12-05 NOTE — PROGRESS NOTES
"Follow Up Sleep Disorders Center Note     Chief Complaint:  FIORELLA     Primary Care Physician: Karlie Hart MD    Interval History:   I last saw the patient in August.  With further adjustments of his oral appliance, he states his sleep time has improved.  His [place] Sleepiness Scale is normal at 2.    Review of Systems:  Recorded on the Sleep Questionnaire.  Unremarkable     Social History:    Social History     Socioeconomic History   • Marital status:      Spouse name: Not on file   • Number of children: 1   • Years of education: Not on file   • Highest education level: Not on file   Occupational History   • Occupation: Healthcare Executive   Tobacco Use   • Smoking status: Former Smoker     Types: Cigarettes     Last attempt to quit:      Years since quittin.9   • Smokeless tobacco: Never Used   Substance and Sexual Activity   • Alcohol use: No   • Drug use: No       Allergies:  Beta adrenergic blockers and Sulfa antibiotics     Medication Review:  Reviewed.      Vital Signs:    Vitals:    18 1100   BP: 137/64   Pulse: (!) 49   Weight: 72.1 kg (159 lb)   Height: 177.8 cm (70\")     Body mass index is 22.81 kg/m².    Physical Exam:    Constitutional:  Well developed white male and appears in no apparent distress.  Awake & oriented times 3.  Normal mood with normal recent and remote memory and normal judgement.  Eyes:  Conjunctivae normal.  Oropharynx:  moist mucous membranes without exudate and a normal sized tongue and uvula and moderate-severe narrowing of the posterior pharyngeal opening and he does have an overbite        Impression:   Obstructive sleep apnea by overnight polysomnogram 2018.  Mild obstructive sleep apnea for total sleep time; mainly moderate severity when supine and during REM.  On his left side he is normal.   There was also evidence for periodic limb movement disorder.  Treated with Mirapex   Home sleep study 2018, weight 170 pounds, mild " obstructive sleep apnea with AHI 13 events per hour.  Sleep-related hypoxemia with low O2 saturation 78% for 22 minutes.  Total time snoring 76%  Patient presently treated with an oral appliance with improvement in symptoms        Plan:  Good sleep hygiene measures should be maintained.  The patient is benefiting from the treatment being provided.     The patient is to have a repeat home sleep study with oral appliance in place at the new settings.    The patient will call for any problems and will follow up after the repeat home sleep study.      Julian Dahl MD  Sleep Medicine  12/05/18  12:02 PM

## 2018-12-06 ENCOUNTER — HOSPITAL ENCOUNTER (OUTPATIENT)
Dept: PHYSICAL THERAPY | Facility: HOSPITAL | Age: 78
Setting detail: THERAPIES SERIES
Discharge: HOME | End: 2018-12-06

## 2018-12-06 DIAGNOSIS — M79.602 LEFT ARM PAIN: Primary | ICD-10-CM

## 2018-12-06 PROCEDURE — 97110 THERAPEUTIC EXERCISES: CPT | Performed by: PHYSICAL THERAPIST

## 2018-12-06 PROCEDURE — 97140 MANUAL THERAPY 1/> REGIONS: CPT | Performed by: PHYSICAL THERAPIST

## 2018-12-06 NOTE — THERAPY PROGRESS REPORT/RE-CERT
Outpatient Physical Therapy Ortho Progress Note  [facility]     Patient Name: Calos Lam  : 1940  MRN: [id number]  Today's Date: 2018      Visit Date: 2018    Visit Dx:    ICD-10-CM ICD-9-CM   1. Left shoulder tendonitis M75.82 726.10   2. Left arm pain M79.602 729.5       Patient Active Problem List   Diagnosis   • FIORELLA (obstructive sleep apnea) intolerent CPAP   • Sleep-related hypoventilation/hypoxia    • Frontotemporal dementia   • Essential hypertension   • Mixed hyperlipidemia   • Type 2 diabetes mellitus without complication (CMS/[facility])   • History of bladder cancer   • RLS (restless legs syndrome)   • Colon polyp   • PLMD (periodic limb movement disorder)        Past Medical History:   Diagnosis Date   • Essential hypertension    • GERD (gastroesophageal reflux disease)    • Impacted cerumen    • Impotence of organic origin    • Malaise and fatigue    • Mantoux: positive    • Memory loss    • Mixed hyperlipidemia    • Organic sleep apnea    • Pain in thoracic spine    • Primary malignant neoplasm of bladder (CMS/HCC) 2009   • Restless leg syndrome, uncontrolled    • Testicular hypofunction    • Vertigo    • Vitamin D deficiency         Past Surgical History:   Procedure Laterality Date   • APPENDECTOMY     • COLON RESECTION LEFT Left    • COLONOSCOPY  2012    normal   • EYE SURGERY      lasik. Dr. Storey   • LASIK Bilateral    • ROOT CANAL     • TURP / TRANSURETHRAL INCISION / DRAINAGE PROSTATE  1980       PT Ortho     Row Name 18 1100       Subjective Comments    Subjective Comments  I got injections and that is helping my movement and pain levels  -KH       Subjective Pain    Able to rate subjective pain?  yes  -KH    Pre-Treatment Pain Level  3  -KH    Post-Treatment Pain Level  3  -KH       Posture/Observations    Alignment Options  Forward head;Cervical lordosis;Thoracic kyphosis;Rounded shoulders  -KH    Forward Head  Severe  -KH    Cervical  Lordosis  Moderate  -KH    Thoracic Kyphosis  Severe  -KH    Rounded Shoulders  Severe  -KH       Right Upper Ext    Rt Shoulder Abduction AROM  165  -KH    Rt Shoulder Flexion AROM  174  -KH    Rt Shoulder External Rotation AROM  70 (at 90 deg abd)  -KH    Rt Shoulder Internal Rotation AROM   60 (at 90 deg abd)  -KH       Left Upper Ext    Lt Shoulder Abduction AROM  140  -KH    Lt Shoulder Flexion AROM  134  -KH    Lt Shoulder External Rotation AROM  39 (at 90 deg abd)  -KH    Lt Shoulder Internal Rotation AROM  33 (at 90 deg abd)  -KH       MMT Right Upper Ext    Rt Shoulder Flexion MMT, Gross Movement  (5/5) normal  -KH    Rt Shoulder Extension MMT, Gross Movement  (5/5) normal  -KH    Rt Shoulder ABduction MMT, Gross Movement  (5/5) normal  -KH    Rt Shoulder Internal Rotation MMT, Gross Movement  (5/5) normal  -KH    Rt Shoulder External Rotation MMT, Gross Movement  (4+/5) good plus  -KH       MMT Left Upper Ext    Lt Shoulder Flexion MMT, Gross Movement  (4-/5) good minus  -KH    Lt Shoulder Extension MMT, Gross Movement  (4-/5) good minus  -KH    Lt Shoulder ABduction MMT, Gross Movement  (4-/5) good minus  -KH    Lt Shoulder Internal Rotation MMT, Gross Movement  (4-/5) good minus  -KH    Lt Shoulder External Rotation MMT, Gross Movement  (4-/5) good minus  -KH      User Key  (r) = Recorded By, (t) = Taken By, (c) = Cosigned By    Initials Name Provider Type    Jennifer Sawyer, PT Physical Therapist                      PT Assessment/Plan     Row Name 12/06/18 1039          PT Assessment    Functional Limitations  Limitations in functional capacity and performance;Performance in leisure activities;Performance in self-care ADL;Performance in sport activities;Limitations in community activities;Limitation in home management;Decreased safety during functional activities  -     Impairments  Muscle strength;Pain;Posture;Range of motion  -     Assessment Comments  Patient was originally referred to physical  therapy with left arm pain which was thought to be due to tendonitis. He failed to improve and was sent back to the MD. MRI Imaging shows a full thickness supraspinatus tear, mild GH joint arthritis with a degenerative tear of the anterior-inferior labrum and mild AC joint arthritis. Patient continues to present with significantly limited L shoulder ROM and strength and is appropriate for skilled PT to avoid surgical intervention. Patient reports difficulty with ADLs, specifically overhead movements, putting away dishes, and washing his hair.   -     Please refer to paper survey for additional self-reported information  Yes  -KH     Rehab Potential  Good  -KH     Patient/caregiver participated in establishment of treatment plan and goals  Yes  -KH     Patient would benefit from skilled therapy intervention  Yes  -KH        PT Plan    PT Frequency  2x/week  -     Predicted Duration of Therapy Intervention (Therapy Eval)  4 weeks  -     Planned CPT's?  PT EVAL LOW COMPLEXITY: 07570;PT RE-EVAL: 39767;PT MANUAL THERAPY EA 15 MIN: 65443;PT EVAL AQUA: 46187;PT HOT OR COLD PACK TREAT MCARE;PT ULTRASOUND EA 15 MIN: 55258;PT IONTOPHORESIS EA 15 MIN: 42186;PT ELECTRICAL STIM UNATTEND: ;PT AQUATIC THERAPY EA 15 MIN: 90081;PT NEUROMUSC RE-EDUCATION EA 15 MIN: 28406;PT THER PROC EA 15 MIN: 92979;PT THER ACT EA 15 MIN: 08953;PT GAIT TRAINING EA 15 MIN: 36445;PT SELF CARE/HOME MGMT/TRAIN EA 15: 31743;PT ELECTRICAL STIM ATTD EA 15 MIN: 70985  -     Physical Therapy Interventions (Optional Details)  patient/family education;home exercise program;postural re-education;ROM (Range of Motion);strengthening;stretching;taping;modalities;gross motor skills;dry needling;manual therapy techniques  -     PT Plan Comments  L shoulder ROM, strengthening, modalaties for pain relief PRN, scapular stability  -       User Key  (r) = Recorded By, (t) = Taken By, (c) = Cosigned By    Initials Name Provider Type    Jennifer Sawyer, PT  Physical Therapist              Exercises     Row Name 12/06/18 1100             Subjective Comments    Subjective Comments  I got injections and that is helping my movement and pain levels  -         Subjective Pain    Able to rate subjective pain?  yes  -KH      Pre-Treatment Pain Level  3  -KH      Post-Treatment Pain Level  3  -KH         Total Minutes    92835 - PT Therapeutic Exercise Minutes  30  -KH      03796 - PT Manual Therapy Minutes  10  -KH         Exercise 1    Exercise Name 1  see flowsheet  -        User Key  (r) = Recorded By, (t) = Taken By, (c) = Cosigned By    Initials Name Provider Type    Jennifer Sawyer, PT Physical Therapist                        Manual Rx (last 36 hours)      Manual Treatments     Row Name 12/06/18 1100             Total Minutes    90157 - PT Manual Therapy Minutes  10  -KH         Manual Rx 1    Manual Rx 1 Location  Left shoulder  -      Manual Rx 1 Type  PROM flex/Abd/IR/ER  -      Manual Rx 1 Duration  10  -KH        User Key  (r) = Recorded By, (t) = Taken By, (c) = Cosigned By    Initials Name Provider Type    Jennifer Sawyer, PT Physical Therapist          PT OP Goals     Row Name 12/06/18 1100          PT Short Term Goals    STG 1  Patient will demonstrate safety and independence with initial hEP  -     STG 1 Progress  Ongoing  -     STG 2  Patient will increase left shoulder flex/abduction to 155 degrees or greater to reduce pain with overhead activities  -     STG 2 Progress  Ongoing  -     STG 3  Patient will increase left shoulder flex/abduction strength to 4/5 or greater to improve stability with lifting household items  -     STG 3 Progress  Ongoing  -        Long Term Goals    LTG 1  Patient will increase left shoulder flex/abduction to 160 degrees or greater to reduce pain with overhead activities  -     LTG 1 Progress  Ongoing  -     LTG 2  Patient will reduce level of perceived disability on the SPADI from 28% disability to </=20%  disability in order to improve quality of life  -     LTG 2 Progress  Ongoing  -     LTG 3  Patient will reduce peak pain level with overheard reaching from 6/10 to 4/10   -     LTG 3 Progress  Ongoing  -       User Key  (r) = Recorded By, (t) = Taken By, (c) = Cosigned By    Initials Name Provider Type    Jennifer Sawyer, PT Physical Therapist           Time Calculation:   Start Time: 1045  Stop Time: 1125  Time Calculation (min): 40 min  Therapy Suggested Charges     Code   Minutes Charges    70819 (CPT®) Hc Pt Neuromusc Re Education Ea 15 Min      20584 (CPT®) Hc Pt Ther Proc Ea 15 Min 30 2    15436 (CPT®) Hc Gait Training Ea 15 Min      56687 (CPT®) Hc Pt Therapeutic Act Ea 15 Min      35519 (CPT®) Hc Pt Manual Therapy Ea 15 Min 10 1    12122 (CPT®) Hc Pt Ther Massage- Per 15 Min      13551 (CPT®) Hc Pt Iontophoresis Ea 15 Min      68955 (CPT®) Hc Pt Elec Stim Ea-Per 15 Min      87058 (CPT®) Hc Pt Ultrasound Ea 15 Min      58306 (CPT®) Hc Pt Self Care/Mgmt/Train Ea 15 Min      76553 (CPT®) Hc Pt Prosthetic (S) Train Initial Encounter, Each 15 Min      21539 (CPT®) Hc Orthotic(S) Mgmt/Train Initial Encounter, Each 15min      01328 (CPT®) Hc Pt Aquatic Therapy Ea 15 Min      60476 (CPT®) Hc Pt Orthotic(S)/Prosthetic(S) Encounter, Each 15 Min       (CPT®) Hc Pt Electrical Stim Unattended      Total  40 3                      Jennifer Brown PT  12/6/2018

## 2018-12-06 NOTE — THERAPY TREATMENT NOTE
Outpatient Physical Therapy Ortho Treatment Note  [facility]     Patient Name: Calos Lam  : 1940  MRN: [id number]  Today's Date: 2018      Visit Date: 2018    Visit Dx:    ICD-10-CM ICD-9-CM   1. Left shoulder tendonitis M75.82 726.10   2. Left arm pain M79.602 729.5       Patient Active Problem List   Diagnosis   • FIORELLA (obstructive sleep apnea) intolerent CPAP   • Sleep-related hypoventilation/hypoxia    • Frontotemporal dementia   • Essential hypertension   • Mixed hyperlipidemia   • Type 2 diabetes mellitus without complication (CMS/[facility])   • History of bladder cancer   • RLS (restless legs syndrome)   • Colon polyp   • PLMD (periodic limb movement disorder)        Past Medical History:   Diagnosis Date   • Essential hypertension    • GERD (gastroesophageal reflux disease)    • Impacted cerumen    • Impotence of organic origin    • Malaise and fatigue    • Mantoux: positive    • Memory loss    • Mixed hyperlipidemia    • Organic sleep apnea    • Pain in thoracic spine    • Primary malignant neoplasm of bladder (CMS/HCC) 2009   • Restless leg syndrome, uncontrolled    • Testicular hypofunction    • Vertigo    • Vitamin D deficiency         Past Surgical History:   Procedure Laterality Date   • APPENDECTOMY     • COLON RESECTION LEFT Left    • COLONOSCOPY  2012    normal   • EYE SURGERY      lasik. Dr. Storey   • LASIK Bilateral    • ROOT CANAL     • TURP / TRANSURETHRAL INCISION / DRAINAGE PROSTATE  1980       PT Ortho     Row Name 18 1100       Subjective Comments    Subjective Comments  I got injections and that is helping my movement and pain levels  -KH       Subjective Pain    Able to rate subjective pain?  yes  -KH    Pre-Treatment Pain Level  3  -KH    Post-Treatment Pain Level  3  -KH       Posture/Observations    Alignment Options  Forward head;Cervical lordosis;Thoracic kyphosis;Rounded shoulders  -KH    Forward Head  Severe  -KH    Cervical  Lordosis  Moderate  -KH    Thoracic Kyphosis  Severe  -KH    Rounded Shoulders  Severe  -KH       Right Upper Ext    Rt Shoulder Abduction AROM  165  -KH    Rt Shoulder Flexion AROM  174  -KH    Rt Shoulder External Rotation AROM  70 (at 90 deg abd)  -KH    Rt Shoulder Internal Rotation AROM   60 (at 90 deg abd)  -KH       Left Upper Ext    Lt Shoulder Abduction AROM  140  -KH    Lt Shoulder Flexion AROM  134  -KH    Lt Shoulder External Rotation AROM  39 (at 90 deg abd)  -KH    Lt Shoulder Internal Rotation AROM  33 (at 90 deg abd)  -KH       MMT Right Upper Ext    Rt Shoulder Flexion MMT, Gross Movement  (5/5) normal  -KH    Rt Shoulder Extension MMT, Gross Movement  (5/5) normal  -KH    Rt Shoulder ABduction MMT, Gross Movement  (5/5) normal  -KH    Rt Shoulder Internal Rotation MMT, Gross Movement  (5/5) normal  -KH    Rt Shoulder External Rotation MMT, Gross Movement  (4+/5) good plus  -KH       MMT Left Upper Ext    Lt Shoulder Flexion MMT, Gross Movement  (4-/5) good minus  -KH    Lt Shoulder Extension MMT, Gross Movement  (4-/5) good minus  -KH    Lt Shoulder ABduction MMT, Gross Movement  (4-/5) good minus  -KH    Lt Shoulder Internal Rotation MMT, Gross Movement  (4-/5) good minus  -KH    Lt Shoulder External Rotation MMT, Gross Movement  (4-/5) good minus  -KH      User Key  (r) = Recorded By, (t) = Taken By, (c) = Cosigned By    Initials Name Provider Type    Jennifer Sawyer, PT Physical Therapist                      PT Assessment/Plan     Row Name 12/06/18 1039          PT Assessment    Functional Limitations  Limitations in functional capacity and performance;Performance in leisure activities;Performance in self-care ADL;Performance in sport activities;Limitations in community activities;Limitation in home management;Decreased safety during functional activities  -     Impairments  Muscle strength;Pain;Posture;Range of motion  -     Assessment Comments  Patient was originally referred to physical  therapy with left arm pain which was thought to be due to tendonitis. He failed to improve and was sent back to the MD. MRI Imaging shows a full thickness supraspinatus tear, mild GH joint arthritis with a degenerative tear of the anterior-inferior labrum and mild AC joint arthritis. Patient continues to present with significantly limited L shoulder ROM and strength and is appropriate for skilled PT to avoid surgical intervention. Patient reports difficulty with ADLs, specifically overhead movements, putting away dishes, and washing his hair.   -     Please refer to paper survey for additional self-reported information  Yes  -KH     Rehab Potential  Good  -KH     Patient/caregiver participated in establishment of treatment plan and goals  Yes  -KH     Patient would benefit from skilled therapy intervention  Yes  -KH        PT Plan    PT Frequency  2x/week  -     Predicted Duration of Therapy Intervention (Therapy Eval)  4 weeks  -     Planned CPT's?  PT EVAL LOW COMPLEXITY: 16065;PT RE-EVAL: 23283;PT MANUAL THERAPY EA 15 MIN: 61828;PT EVAL AQUA: 78142;PT HOT OR COLD PACK TREAT MCARE;PT ULTRASOUND EA 15 MIN: 71136;PT IONTOPHORESIS EA 15 MIN: 39610;PT ELECTRICAL STIM UNATTEND: ;PT AQUATIC THERAPY EA 15 MIN: 47973;PT NEUROMUSC RE-EDUCATION EA 15 MIN: 67737;PT THER PROC EA 15 MIN: 37734;PT THER ACT EA 15 MIN: 81251;PT GAIT TRAINING EA 15 MIN: 07734;PT SELF CARE/HOME MGMT/TRAIN EA 15: 01696;PT ELECTRICAL STIM ATTD EA 15 MIN: 81301  -     Physical Therapy Interventions (Optional Details)  patient/family education;home exercise program;postural re-education;ROM (Range of Motion);strengthening;stretching;taping;modalities;gross motor skills;dry needling;manual therapy techniques  -     PT Plan Comments  L shoulder ROM, strengthening, modalaties for pain relief PRN, scapular stability  -       User Key  (r) = Recorded By, (t) = Taken By, (c) = Cosigned By    Initials Name Provider Type    Jennifer Sawyer, PT  Physical Therapist              Exercises     Row Name 12/06/18 1100             Subjective Comments    Subjective Comments  I got injections and that is helping my movement and pain levels  -         Subjective Pain    Able to rate subjective pain?  yes  -KH      Pre-Treatment Pain Level  3  -KH      Post-Treatment Pain Level  3  -KH         Total Minutes    15589 - PT Therapeutic Exercise Minutes  30  -KH      33379 - PT Manual Therapy Minutes  10  -KH         Exercise 1    Exercise Name 1  see flowsheet  -        User Key  (r) = Recorded By, (t) = Taken By, (c) = Cosigned By    Initials Name Provider Type    Jennifer Sawyer, PT Physical Therapist                  Manual Rx (last 36 hours)      Manual Treatments     Row Name 12/06/18 1100             Total Minutes    79649 - PT Manual Therapy Minutes  10  -KH         Manual Rx 1    Manual Rx 1 Location  Left shoulder  -      Manual Rx 1 Type  PROM flex/Abd/IR/ER  -      Manual Rx 1 Duration  10  -KH        User Key  (r) = Recorded By, (t) = Taken By, (c) = Cosigned By    Initials Name Provider Type    Jennifer Sawyer, PT Physical Therapist          PT OP Goals     Row Name 12/06/18 1100          PT Short Term Goals    STG 1  Patient will demonstrate safety and independence with initial hEP  -     STG 1 Progress  Ongoing  -     STG 2  Patient will increase left shoulder flex/abduction to 155 degrees or greater to reduce pain with overhead activities  -     STG 2 Progress  Ongoing  -     STG 3  Patient will increase left shoulder flex/abduction strength to 4/5 or greater to improve stability with lifting household items  -     STG 3 Progress  Ongoing  -        Long Term Goals    LTG 1  Patient will increase left shoulder flex/abduction to 160 degrees or greater to reduce pain with overhead activities  -     LTG 1 Progress  Ongoing  -     LTG 2  Patient will reduce level of perceived disability on the SPADI from 28% disability to </=20%  disability in order to improve quality of life  -     LTG 2 Progress  Ongoing  -     LTG 3  Patient will reduce peak pain level with overheard reaching from 6/10 to 4/10   -     LTG 3 Progress  Ongoing  -       User Key  (r) = Recorded By, (t) = Taken By, (c) = Cosigned By    Initials Name Provider Type    Jennifer Sawyer, PT Physical Therapist          Time Calculation:   Start Time: 1045  Stop Time: 1125  Time Calculation (min): 40 min  Therapy Suggested Charges     Code   Minutes Charges    77497 (CPT®) Hc Pt Neuromusc Re Education Ea 15 Min      42351 (CPT®) Hc Pt Ther Proc Ea 15 Min 30 2    38410 (CPT®) Hc Gait Training Ea 15 Min      57161 (CPT®) Hc Pt Therapeutic Act Ea 15 Min      01626 (CPT®) Hc Pt Manual Therapy Ea 15 Min 10 1    68243 (CPT®) Hc Pt Ther Massage- Per 15 Min      46424 (CPT®) Hc Pt Iontophoresis Ea 15 Min      73758 (CPT®) Hc Pt Elec Stim Ea-Per 15 Min      18642 (CPT®) Hc Pt Ultrasound Ea 15 Min      19015 (CPT®) Hc Pt Self Care/Mgmt/Train Ea 15 Min      80721 (CPT®) Hc Pt Prosthetic (S) Train Initial Encounter, Each 15 Min      59248 (CPT®) Hc Orthotic(S) Mgmt/Train Initial Encounter, Each 15min      31629 (CPT®) Hc Pt Aquatic Therapy Ea 15 Min      47332 (CPT®) Hc Pt Orthotic(S)/Prosthetic(S) Encounter, Each 15 Min       (CPT®) Hc Pt Electrical Stim Unattended      Total  40 3                      Jennifer Brown PT  12/6/2018

## 2018-12-10 ENCOUNTER — APPOINTMENT (OUTPATIENT)
Dept: PHYSICAL THERAPY | Facility: HOSPITAL | Age: 78
End: 2018-12-10

## 2018-12-19 ENCOUNTER — HOSPITAL ENCOUNTER (OUTPATIENT)
Dept: PHYSICAL THERAPY | Facility: HOSPITAL | Age: 78
Setting detail: THERAPIES SERIES
Discharge: HOME | End: 2018-12-19

## 2018-12-19 DIAGNOSIS — M79.602 LEFT ARM PAIN: Primary | ICD-10-CM

## 2018-12-19 PROCEDURE — 97110 THERAPEUTIC EXERCISES: CPT | Performed by: PHYSICAL THERAPIST

## 2018-12-19 PROCEDURE — 97140 MANUAL THERAPY 1/> REGIONS: CPT | Performed by: PHYSICAL THERAPIST

## 2018-12-19 NOTE — THERAPY TREATMENT NOTE
Outpatient Physical Therapy Ortho Treatment Note  [facility]     Patient Name: Caols Lam  : 1940  MRN: [id number]  Today's Date: 2018      Visit Date: 2018    Visit Dx:    ICD-10-CM ICD-9-CM   1. Left arm pain M79.602 729.5       Patient Active Problem List   Diagnosis   • FIORELLA (obstructive sleep apnea) intolerent CPAP   • Sleep-related hypoventilation/hypoxia    • Frontotemporal dementia   • Essential hypertension   • Mixed hyperlipidemia   • Type 2 diabetes mellitus without complication (CMS/HCC)   • History of bladder cancer   • RLS (restless legs syndrome)   • Colon polyp   • PLMD (periodic limb movement disorder)        Past Medical History:   Diagnosis Date   • Essential hypertension    • GERD (gastroesophageal reflux disease)    • Impacted cerumen    • Impotence of organic origin    • Malaise and fatigue    • Mantoux: positive    • Memory loss    • Mixed hyperlipidemia    • Organic sleep apnea    • Pain in thoracic spine    • Primary malignant neoplasm of bladder (CMS/HCC) 2009   • Restless leg syndrome, uncontrolled    • Testicular hypofunction    • Vertigo    • Vitamin D deficiency         Past Surgical History:   Procedure Laterality Date   • APPENDECTOMY     • COLON RESECTION LEFT Left    • COLONOSCOPY  2012    normal   • EYE SURGERY      lasik. Dr. Storey   • LASIK Bilateral    • ROOT CANAL     • TURP / TRANSURETHRAL INCISION / DRAINAGE PROSTATE  1980                       PT Assessment/Plan     Row Name 18 1212          PT Assessment    Assessment Comments  Patient has trouble relaxing his muscles during PROM for ER and requires tactile cueing to perform AAROM for ER properly with the cane. He is progressing well with his strength training but requires vc's to perform exercises more slowly and to keep his elbow tight with IR/ER.  -GEETA       User Key  (r) = Recorded By, (t) = Taken By, (c) = Cosigned By    Initials Name Provider Type    GEETA  Jennifre Brown, PT Physical Therapist              Exercises     Row Name 12/19/18 1100             Subjective Comments    Subjective Comments  My arm is doing ok, about the same overall  -         Subjective Pain    Able to rate subjective pain?  yes  -KH      Pre-Treatment Pain Level  3  -KH      Post-Treatment Pain Level  3  -KH         Total Minutes    12267 - PT Therapeutic Exercise Minutes  30  -KH      99238 - PT Manual Therapy Minutes  10  -KH         Exercise 1    Exercise Name 1  see flowsheet  -        User Key  (r) = Recorded By, (t) = Taken By, (c) = Cosigned By    Initials Name Provider Type    Jennifer Sawyer, PT Physical Therapist                        Manual Rx (last 36 hours)      Manual Treatments     Row Name 12/19/18 1100             Total Minutes    96103 - PT Manual Therapy Minutes  10  -KH         Manual Rx 1    Manual Rx 1 Location  Left shoulder  -KH      Manual Rx 1 Type  PROM flex/Abd/IR/ER  -      Manual Rx 1 Duration  10  -KH        User Key  (r) = Recorded By, (t) = Taken By, (c) = Cosigned By    Initials Name Provider Type    Jennifer Sawyer, PT Physical Therapist                             Time Calculation:   Start Time: 1130  Stop Time: 1210  Time Calculation (min): 40 min  Therapy Suggested Charges     Code   Minutes Charges    68375 (CPT®) Hc Pt Neuromusc Re Education Ea 15 Min      97234 (CPT®) Hc Pt Ther Proc Ea 15 Min 30 2    96088 (CPT®) Hc Gait Training Ea 15 Min      00046 (CPT®) Hc Pt Therapeutic Act Ea 15 Min      90450 (CPT®) Hc Pt Manual Therapy Ea 15 Min 10 1    03943 (CPT®) Hc Pt Ther Massage- Per 15 Min      56649 (CPT®) Hc Pt Iontophoresis Ea 15 Min      46479 (CPT®) Hc Pt Elec Stim Ea-Per 15 Min      10829 (CPT®) Hc Pt Ultrasound Ea 15 Min      33902 (CPT®) Hc Pt Self Care/Mgmt/Train Ea 15 Min      31823 (CPT®) Hc Pt Prosthetic (S) Train Initial Encounter, Each 15 Min      96510 (CPT®) Hc Orthotic(S) Mgmt/Train Initial Encounter, Each 15min      29961 (CPT®)  Hc Pt Aquatic Therapy Ea 15 Min      92455 (CPT®) Hc Pt Orthotic(S)/Prosthetic(S) Encounter, Each 15 Min       (CPT®) Hc Pt Electrical Stim Unattended      Total  40 3        Therapy Charges for Today     Code Description Service Date Service Provider Modifiers Qty    03667016968 HC PT MANUAL THERAPY EA 15 MIN 12/19/2018 Jennifer Brown, PT GP 1    03188320817 HC PT THER PROC EA 15 MIN 12/19/2018 Jennifer Brown, PT GP 2                    Jennifer Brown, PT  12/19/2018

## 2018-12-24 RX ORDER — ERGOCALCIFEROL 1.25 MG/1
CAPSULE ORAL
Qty: 12 CAPSULE | Refills: 2 | Status: SHIPPED | OUTPATIENT
Start: 2018-12-24 | End: 2019-09-11 | Stop reason: SDUPTHER

## 2018-12-27 ENCOUNTER — HOSPITAL ENCOUNTER (OUTPATIENT)
Dept: SLEEP MEDICINE | Facility: HOSPITAL | Age: 78
Discharge: HOME | End: 2018-12-27
Attending: INTERNAL MEDICINE | Admitting: INTERNAL MEDICINE

## 2018-12-27 DIAGNOSIS — G47.33 OSA (OBSTRUCTIVE SLEEP APNEA): ICD-10-CM

## 2018-12-27 PROCEDURE — 95806 SLEEP STUDY UNATT&RESP EFFT: CPT | Performed by: INTERNAL MEDICINE

## 2018-12-27 PROCEDURE — 95806 SLEEP STUDY UNATT&RESP EFFT: CPT

## 2018-12-28 ENCOUNTER — HOSPITAL ENCOUNTER (OUTPATIENT)
Dept: PHYSICAL THERAPY | Facility: HOSPITAL | Age: 78
Setting detail: THERAPIES SERIES
Discharge: HOME | End: 2018-12-28

## 2018-12-28 DIAGNOSIS — M79.602 LEFT ARM PAIN: Primary | ICD-10-CM

## 2018-12-28 PROCEDURE — 97140 MANUAL THERAPY 1/> REGIONS: CPT | Performed by: PHYSICAL THERAPIST

## 2018-12-28 PROCEDURE — 97110 THERAPEUTIC EXERCISES: CPT | Performed by: PHYSICAL THERAPIST

## 2018-12-28 NOTE — THERAPY TREATMENT NOTE
Outpatient Physical Therapy Ortho Treatment Note  [facility]     Patient Name: Calos Lam  : 1940  MRN: [id number]  Today's Date: 2018      Visit Date: 2018    Visit Dx:    ICD-10-CM ICD-9-CM   1. Left arm pain M79.602 729.5       Patient Active Problem List   Diagnosis   • FIORELLA (obstructive sleep apnea) intolerent CPAP   • Sleep-related hypoventilation/hypoxia    • Frontotemporal dementia   • Essential hypertension   • Mixed hyperlipidemia   • Type 2 diabetes mellitus without complication (CMS/HCC)   • History of bladder cancer   • RLS (restless legs syndrome)   • Colon polyp   • PLMD (periodic limb movement disorder)        Past Medical History:   Diagnosis Date   • Essential hypertension    • GERD (gastroesophageal reflux disease)    • Impacted cerumen    • Impotence of organic origin    • Malaise and fatigue    • Mantoux: positive    • Memory loss    • Mixed hyperlipidemia    • Organic sleep apnea    • Pain in thoracic spine    • Primary malignant neoplasm of bladder (CMS/HCC) 2009   • Restless leg syndrome, uncontrolled    • Testicular hypofunction    • Vertigo    • Vitamin D deficiency         Past Surgical History:   Procedure Laterality Date   • APPENDECTOMY     • COLON RESECTION LEFT Left    • COLONOSCOPY  2012    normal   • EYE SURGERY      lasik. Dr. Storey   • LASIK Bilateral    • ROOT CANAL     • TURP / TRANSURETHRAL INCISION / DRAINAGE PROSTATE  1980                       PT Assessment/Plan     Row Name 18 1147          PT Assessment    Assessment Comments  Patient's AROM and PROM continues to improve but he still required frequent vc's to relax during manual therapy. He continues to have difficulty performing the correct angle with ER exercises and requires tactile cues to correct form. Performed shoulder flex/abduction without weight today due to patient compensating with upper trap when using 2 lb weight.   -KH       User Key  (r) =  Recorded By, (t) = Taken By, (c) = Cosigned By    Initials Name Provider Type    Jennifer Sawyer, PT Physical Therapist              Exercises     Row Name 12/28/18 1100             Subjective Comments    Subjective Comments  My arm is doing well but I wasn't able to do my exercises a whole lot with the Jinny holiday  -         Subjective Pain    Able to rate subjective pain?  yes  -      Pre-Treatment Pain Level  4  -KH      Post-Treatment Pain Level  4  -KH         Total Minutes    20075 - PT Therapeutic Exercise Minutes  30  -KH      13658 - PT Manual Therapy Minutes  10  -KH         Exercise 1    Exercise Name 1  see flowsheet  -        User Key  (r) = Recorded By, (t) = Taken By, (c) = Cosigned By    Initials Name Provider Type    Jennifer Sawyer, PT Physical Therapist                        Manual Rx (last 36 hours)      Manual Treatments     Row Name 12/28/18 1100             Total Minutes    93070 - PT Manual Therapy Minutes  10  -KH         Manual Rx 1    Manual Rx 1 Location  Left shoulder  -      Manual Rx 1 Type  PROM flex/Abd/IR/ER  -      Manual Rx 1 Duration  10  -KH        User Key  (r) = Recorded By, (t) = Taken By, (c) = Cosigned By    Initials Name Provider Type    Jennifer Sawyer, PT Physical Therapist          PT OP Goals     Row Name 12/28/18 1100          PT Short Term Goals    STG 1  Patient will demonstrate safety and independence with initial hEP  -     STG 1 Progress  Progressing  -     STG 1 Progress Comments  still requires cueing for form  -     STG 2  Patient will increase left shoulder flex/abduction to 155 degrees or greater to reduce pain with overhead activities  -     STG 2 Progress  Ongoing  -     STG 3  Patient will increase left shoulder flex/abduction strength to 4/5 or greater to improve stability with lifting household items  -     STG 3 Progress  Ongoing  -        Long Term Goals    LTG 1  Patient will increase left shoulder flex/abduction to 160  degrees or greater to reduce pain with overhead activities  -     LTG 1 Progress  Ongoing  -     LTG 2  Patient will reduce level of perceived disability on the SPADI from 28% disability to </=20% disability in order to improve quality of life  -     LTG 2 Progress  Ongoing  -     LTG 3  Patient will reduce peak pain level with overheard reaching from 6/10 to 4/10   -     LTG 3 Progress  Ongoing  -       User Key  (r) = Recorded By, (t) = Taken By, (c) = Cosigned By    Initials Name Provider Type    Jennifer Sawyer, PT Physical Therapist                         Time Calculation:   Start Time: 1130  Stop Time: 1210  Time Calculation (min): 40 min  Therapy Suggested Charges     Code   Minutes Charges    75777 (CPT®) Hc Pt Neuromusc Re Education Ea 15 Min      56882 (CPT®) Hc Pt Ther Proc Ea 15 Min 30 2    39466 (CPT®) Hc Gait Training Ea 15 Min      77927 (CPT®) Hc Pt Therapeutic Act Ea 15 Min      64261 (CPT®) Hc Pt Manual Therapy Ea 15 Min 10 1    55099 (CPT®) Hc Pt Ther Massage- Per 15 Min      88389 (CPT®) Hc Pt Iontophoresis Ea 15 Min      75033 (CPT®) Hc Pt Elec Stim Ea-Per 15 Min      94509 (CPT®) Hc Pt Ultrasound Ea 15 Min      87113 (CPT®) Hc Pt Self Care/Mgmt/Train Ea 15 Min      63331 (CPT®) Hc Pt Prosthetic (S) Train Initial Encounter, Each 15 Min      28947 (CPT®) Hc Orthotic(S) Mgmt/Train Initial Encounter, Each 15min      89322 (CPT®) Hc Pt Aquatic Therapy Ea 15 Min      77132 (CPT®) Hc Pt Orthotic(S)/Prosthetic(S) Encounter, Each 15 Min       (CPT®) Hc Pt Electrical Stim Unattended      Total  40 3        Therapy Charges for Today     Code Description Service Date Service Provider Modifiers Qty    24306856753 HC PT MANUAL THERAPY EA 15 MIN 12/28/2018 Jennifer Brown, PT GP 1    21694835959 HC PT THER PROC EA 15 MIN 12/28/2018 Jennifer Brown, PT GP 2                    Jennifer Brown PT  12/28/2018

## 2019-01-02 ENCOUNTER — HOSPITAL ENCOUNTER (OUTPATIENT)
Dept: PHYSICAL THERAPY | Facility: HOSPITAL | Age: 79
Setting detail: THERAPIES SERIES
Discharge: HOME | End: 2019-01-02

## 2019-01-02 DIAGNOSIS — M79.602 LEFT ARM PAIN: Primary | ICD-10-CM

## 2019-01-02 PROCEDURE — 97140 MANUAL THERAPY 1/> REGIONS: CPT | Performed by: PHYSICAL THERAPIST

## 2019-01-02 PROCEDURE — 97110 THERAPEUTIC EXERCISES: CPT | Performed by: PHYSICAL THERAPIST

## 2019-01-02 NOTE — THERAPY TREATMENT NOTE
Outpatient Physical Therapy Ortho Treatment Note  [facility]     Patient Name: Calos Lam  : 1940  MRN: [id number]  Today's Date: 2019      Visit Date: 2019    Visit Dx:    ICD-10-CM ICD-9-CM   1. Left arm pain M79.602 729.5       Patient Active Problem List   Diagnosis   • FIORELLA (obstructive sleep apnea) intolerent CPAP   • Sleep-related hypoventilation/hypoxia    • Frontotemporal dementia   • Essential hypertension   • Mixed hyperlipidemia   • Type 2 diabetes mellitus without complication (CMS/[facility])   • History of bladder cancer   • RLS (restless legs syndrome)   • Colon polyp   • PLMD (periodic limb movement disorder)        Past Medical History:   Diagnosis Date   • Essential hypertension    • GERD (gastroesophageal reflux disease)    • Impacted cerumen    • Impotence of organic origin    • Malaise and fatigue    • Mantoux: positive    • Memory loss    • Mixed hyperlipidemia    • Organic sleep apnea    • Pain in thoracic spine    • Primary malignant neoplasm of bladder (CMS/HCC) 2009   • Restless leg syndrome, uncontrolled    • Testicular hypofunction    • Vertigo    • Vitamin D deficiency         Past Surgical History:   Procedure Laterality Date   • APPENDECTOMY     • COLON RESECTION LEFT Left    • COLONOSCOPY  2012    normal   • EYE SURGERY      lasik. Dr. Storey   • LASIK Bilateral    • ROOT CANAL     • TURP / TRANSURETHRAL INCISION / DRAINAGE PROSTATE  1980                       PT Assessment/Plan     Row Name 19 1117          PT Assessment    Assessment Comments  Patient reports no pain with daily ADLs however he hasn't tested out his arm with any sort of heavy lifting at home. Advised patient to only lift very light objects in order to continue allowing his shoulder to heal. Added scapular punches and resisted TB ER both in supine.   -GEETA       User Key  (r) = Recorded By, (t) = Taken By, (c) = Cosigned By    Initials Name Provider Type    GEETA  Jennifer Brown, PT Physical Therapist              Exercises     Row Name 01/02/19 1100             Subjective Comments    Subjective Comments  I really only have pain with some of my exercises. Daily activities don't seem to bother it anymore  -         Subjective Pain    Able to rate subjective pain?  yes  -KH      Pre-Treatment Pain Level  0  -KH      Post-Treatment Pain Level  0  -KH         Total Minutes    76926 - PT Therapeutic Exercise Minutes  30  -KH      85037 - PT Manual Therapy Minutes  10  -KH         Exercise 1    Exercise Name 1  see flowsheet  -        User Key  (r) = Recorded By, (t) = Taken By, (c) = Cosigned By    Initials Name Provider Type    Jennifer Sawyer, PT Physical Therapist                        Manual Rx (last 36 hours)      Manual Treatments     Row Name 01/02/19 1100             Total Minutes    60372 - PT Manual Therapy Minutes  10  -KH         Manual Rx 1    Manual Rx 1 Location  Left shoulder  -      Manual Rx 1 Type  PROM flex/Abd/IR/ER  -      Manual Rx 1 Duration  10  -KH        User Key  (r) = Recorded By, (t) = Taken By, (c) = Cosigned By    Initials Name Provider Type    Jennifer Sawyer, PT Physical Therapist                             Time Calculation:   Start Time: 1100  Stop Time: 1140  Time Calculation (min): 40 min  Therapy Suggested Charges     Code   Minutes Charges    64766 (CPT®) Hc Pt Neuromusc Re Education Ea 15 Min      94392 (CPT®) Hc Pt Ther Proc Ea 15 Min 30 2    57242 (CPT®) Hc Gait Training Ea 15 Min      36641 (CPT®) Hc Pt Therapeutic Act Ea 15 Min      28127 (CPT®) Hc Pt Manual Therapy Ea 15 Min 10 1    70020 (CPT®) Hc Pt Ther Massage- Per 15 Min      63862 (CPT®) Hc Pt Iontophoresis Ea 15 Min      24066 (CPT®) Hc Pt Elec Stim Ea-Per 15 Min      38757 (CPT®) Hc Pt Ultrasound Ea 15 Min      09888 (CPT®) Hc Pt Self Care/Mgmt/Train Ea 15 Min      61059 (CPT®)  Pt Prosthetic (S) Train Initial Encounter, Each 15 Min      09121 (CPT®) Hc Orthotic(S)  Mgmt/Train Initial Encounter, Each 15min      18586 (CPT®) Hc Pt Aquatic Therapy Ea 15 Min      60507 (CPT®) Hc Pt Orthotic(S)/Prosthetic(S) Encounter, Each 15 Min       (CPT®) Hc Pt Electrical Stim Unattended      Total  40 3        Therapy Charges for Today     Code Description Service Date Service Provider Modifiers Qty    97918507379 HC PT MANUAL THERAPY EA 15 MIN 1/2/2019 Jennifer Brown, PT GP 1    48596686183 HC PT THER PROC EA 15 MIN 1/2/2019 Jennifer Brown, PT GP 2                    Jennifer Brown, PT  1/2/2019

## 2019-01-04 ENCOUNTER — HOSPITAL ENCOUNTER (OUTPATIENT)
Dept: PHYSICAL THERAPY | Facility: HOSPITAL | Age: 79
Setting detail: THERAPIES SERIES
Discharge: HOME | End: 2019-01-04

## 2019-01-04 DIAGNOSIS — M79.602 LEFT ARM PAIN: Primary | ICD-10-CM

## 2019-01-04 PROCEDURE — 97140 MANUAL THERAPY 1/> REGIONS: CPT | Performed by: PHYSICAL THERAPIST

## 2019-01-04 PROCEDURE — 97110 THERAPEUTIC EXERCISES: CPT | Performed by: PHYSICAL THERAPIST

## 2019-01-04 NOTE — THERAPY PROGRESS REPORT/RE-CERT
Outpatient Physical Therapy Ortho Progress Note  [facility]     Patient Name: Calos Lam  : 1940  MRN: [id number]  Today's Date: 2019      Visit Date: 2019    Visit Dx:    ICD-10-CM ICD-9-CM   1. Left arm pain M79.602 729.5       Patient Active Problem List   Diagnosis   • FIORELLA (obstructive sleep apnea) intolerent CPAP   • Sleep-related hypoventilation/hypoxia    • Frontotemporal dementia   • Essential hypertension   • Mixed hyperlipidemia   • Type 2 diabetes mellitus without complication (CMS/HCC)   • History of bladder cancer   • RLS (restless legs syndrome)   • Colon polyp   • PLMD (periodic limb movement disorder)        Past Medical History:   Diagnosis Date   • Essential hypertension    • GERD (gastroesophageal reflux disease)    • Impacted cerumen    • Impotence of organic origin    • Malaise and fatigue    • Mantoux: positive    • Memory loss    • Mixed hyperlipidemia    • Organic sleep apnea    • Pain in thoracic spine    • Primary malignant neoplasm of bladder (CMS/HCC) 2009   • Restless leg syndrome, uncontrolled    • Testicular hypofunction    • Vertigo    • Vitamin D deficiency         Past Surgical History:   Procedure Laterality Date   • APPENDECTOMY     • COLON RESECTION LEFT Left    • COLONOSCOPY  2012    normal   • EYE SURGERY      lasik. Dr. Storey   • LASIK Bilateral    • ROOT CANAL     • TURP / TRANSURETHRAL INCISION / DRAINAGE PROSTATE  1980       PT Ortho     Row Name 19 1100       Left Upper Ext    Lt Shoulder Abduction AROM  155  -KH    Lt Shoulder Flexion AROM  155  -KH    Lt Shoulder External Rotation AROM  58 (at 90 deg abd)  -KH    Lt Shoulder Internal Rotation AROM  87 (at 90 deg abd)  -KH      User Key  (r) = Recorded By, (t) = Taken By, (c) = Cosigned By    Initials Name Provider Type    Jnenifer Sawyer, PT Physical Therapist                      PT Assessment/Plan     Row Name 19 1142          PT Assessment     Functional Limitations  Limitations in functional capacity and performance;Performance in leisure activities;Performance in self-care ADL;Performance in sport activities;Limitations in community activities;Limitation in home management;Decreased safety during functional activities  -     Impairments  Muscle strength;Pain;Posture;Range of motion  -     Assessment Comments  Pj has participated in skilled PT since early September of 2018 for left arm pain due to a full thickness supraspinatus tear, mild GH joint arthritis with a degenerative tear of the anterior-inferior labrum and mild AC joint arthritis. Pj reports no pain at rest but continues to have discomfort with any overhead movements. L shoulder AROM is improving and flexion/abduction measures at 155 degrees today. Pj continues to require intermittent cueing to avoid compensation from upper trap with overhead movements. Recommend continued skilled PT to help further increase shoulder stability and reduce discomfort with lifting/reaching.   -     Please refer to paper survey for additional self-reported information  Yes  -KH     Rehab Potential  Good  -KH     Patient/caregiver participated in establishment of treatment plan and goals  Yes  -KH     Patient would benefit from skilled therapy intervention  Yes  -KH        PT Plan    PT Frequency  2x/week  -     Predicted Duration of Therapy Intervention (Therapy Eval)  4 weeks  -KH     Planned CPT's?  PT RE-EVAL: 35046;PT MANUAL THERAPY EA 15 MIN: 50944;PT HOT OR COLD PACK TREAT MCARE;PT ULTRASOUND EA 15 MIN: 77919;PT IONTOPHORESIS EA 15 MIN: 33008;PT TRACTION CERVICAL: 92200;PT ELECTRICAL STIM UNATTEND: ;PT AQUATIC THERAPY EA 15 MIN: 84151;PT NEUROMUSC RE-EDUCATION EA 15 MIN: 72936;PT THER PROC EA 15 MIN: 90913;PT THER ACT EA 15 MIN: 66712;PT GAIT TRAINING EA 15 MIN: 95056;PT SELF CARE/HOME MGMT/TRAIN EA 15: 34360;PT ELECTRICAL STIM ATTD EA 15 MIN: 55928  -     Physical Therapy Interventions  (Optional Details)  joint mobilization;postural re-education;ROM (Range of Motion);strengthening;modalities;stretching;taping;gross motor skills;home exercise program;patient/family education  -     PT Plan Comments  L shoulder ROM, strengthening, modalaties for pain relief PRN, scapular stability  -       User Key  (r) = Recorded By, (t) = Taken By, (c) = Cosigned By    Initials Name Provider Type    Jennifer Sawyer, PT Physical Therapist              Exercises     Row Name 01/04/19 1100             Subjective Comments    Subjective Comments  I have no pain at rest but it hurts when I reach overhead  -         Subjective Pain    Able to rate subjective pain?  yes  -      Pre-Treatment Pain Level  0  -KH      Post-Treatment Pain Level  0  -KH         Total Minutes    08876 - PT Therapeutic Exercise Minutes  30  -KH      45673 - PT Manual Therapy Minutes  10  -KH         Exercise 1    Exercise Name 1  see flowsheet  -      Cueing 1  Verbal;Tactile;Demo  -        User Key  (r) = Recorded By, (t) = Taken By, (c) = Cosigned By    Initials Name Provider Type    Jennifer Sawyer, PT Physical Therapist                        Manual Rx (last 36 hours)      Manual Treatments     Row Name 01/04/19 1100             Total Minutes    34203 - PT Manual Therapy Minutes  10  -KH         Manual Rx 1    Manual Rx 1 Location  Left shoulder  -      Manual Rx 1 Type  PROM flex/Abd/IR/ER  -      Manual Rx 1 Duration  10  -KH        User Key  (r) = Recorded By, (t) = Taken By, (c) = Cosigned By    Initials Name Provider Type    Jennifer Sawyer, PT Physical Therapist          PT OP Goals     Row Name 01/04/19 1100          PT Short Term Goals    STG 1  Patient will demonstrate safety and independence with initial hEP  -     STG 1 Progress  Met  -     STG 2  Patient will increase left shoulder flex/abduction to 155 degrees or greater to reduce pain with overhead activities  -     STG 2 Progress  Met  -     STG 2  Progress Comments  155 degrees  -     STG 3  Patient will increase left shoulder flex/abduction strength to 4/5 or greater to improve stability with lifting household items  -     STG 3 Progress  Ongoing  -        Long Term Goals    LTG 1  Patient will increase left shoulder flex/abduction to 160 degrees or greater to reduce pain with overhead activities  -     LTG 1 Progress  Ongoing  -     LTG 2  Patient will reduce level of perceived disability on the SPADI from 28% disability to </=20% disability in order to improve quality of life  -     LTG 2 Progress  Ongoing  -     LTG 3  Patient will reduce peak pain level with overheard reaching from 6/10 to 4/10   -     LTG 3 Progress  Met  -     LTG 3 Progress Comments  3/10  -       User Key  (r) = Recorded By, (t) = Taken By, (c) = Cosigned By    Initials Name Provider Type    Jennifer Sawyer, PT Physical Therapist                         Time Calculation:   Start Time: 1100  Stop Time: 1140  Time Calculation (min): 40 min  Therapy Suggested Charges     Code   Minutes Charges    33593 (CPT®) Hc Pt Neuromusc Re Education Ea 15 Min      99453 (CPT®) Hc Pt Ther Proc Ea 15 Min 30 2    16056 (CPT®) Hc Gait Training Ea 15 Min      34657 (CPT®) Hc Pt Therapeutic Act Ea 15 Min      52716 (CPT®) Hc Pt Manual Therapy Ea 15 Min 10 1    32760 (CPT®) Hc Pt Ther Massage- Per 15 Min      78246 (CPT®) Hc Pt Iontophoresis Ea 15 Min      20037 (CPT®) Hc Pt Elec Stim Ea-Per 15 Min      46070 (CPT®) Hc Pt Ultrasound Ea 15 Min      22651 (CPT®) Hc Pt Self Care/Mgmt/Train Ea 15 Min      39669 (CPT®) Hc Pt Prosthetic (S) Train Initial Encounter, Each 15 Min      54761 (CPT®) Hc Orthotic(S) Mgmt/Train Initial Encounter, Each 15min      96783 (CPT®) Hc Pt Aquatic Therapy Ea 15 Min      93850 (CPT®) Hc Pt Orthotic(S)/Prosthetic(S) Encounter, Each 15 Min       (CPT®) Hc Pt Electrical Stim Unattended      Total  40 3        Therapy Charges for Today     Code Description  Service Date Service Provider Modifiers Qty    24556365716 HC PT MANUAL THERAPY EA 15 MIN 1/4/2019 Jennifer Brown, PT GP 1    49820783642 HC PT THER PROC EA 15 MIN 1/4/2019 Jennifer Brown, PT GP 2                    Jennifer Brown, PT  1/4/2019

## 2019-01-07 ENCOUNTER — TELEPHONE (OUTPATIENT)
Dept: SLEEP MEDICINE | Facility: HOSPITAL | Age: 79
End: 2019-01-07

## 2019-01-07 NOTE — TELEPHONE ENCOUNTER
Spoke with patient about sleep study results, he will follow up in December, Faxing results to [facility]

## 2019-02-26 DIAGNOSIS — E11.8 CONTROLLED DIABETES MELLITUS TYPE 2 WITH COMPLICATIONS, UNSPECIFIED WHETHER LONG TERM INSULIN USE (HCC): ICD-10-CM

## 2019-02-26 DIAGNOSIS — E78.5 HYPERLIPIDEMIA, UNSPECIFIED HYPERLIPIDEMIA TYPE: Primary | ICD-10-CM

## 2019-02-26 DIAGNOSIS — Z12.5 SCREENING PSA (PROSTATE SPECIFIC ANTIGEN): ICD-10-CM

## 2019-02-28 LAB
ALBUMIN SERPL-MCNC: 4.1 G/DL (ref 3.5–5.2)
ALBUMIN/CREAT UR: 19.8 MG/G CREAT (ref 0–30)
ALBUMIN/GLOB SERPL: 1.6 G/DL
ALP SERPL-CCNC: 55 U/L (ref 39–117)
ALT SERPL-CCNC: 14 U/L (ref 1–41)
APPEARANCE UR: CLEAR
AST SERPL-CCNC: 11 U/L (ref 1–40)
BACTERIA #/AREA URNS HPF: NORMAL /HPF
BASOPHILS # BLD AUTO: 0.02 10*3/MM3 (ref 0–0.2)
BASOPHILS NFR BLD AUTO: 0.3 % (ref 0–1.5)
BILIRUB SERPL-MCNC: 0.7 MG/DL (ref 0.1–1.2)
BILIRUB UR QL STRIP: NEGATIVE
BUN SERPL-MCNC: 21 MG/DL (ref 8–23)
BUN/CREAT SERPL: 23.6 (ref 7–25)
CALCIUM SERPL-MCNC: 9.8 MG/DL (ref 8.6–10.5)
CHLORIDE SERPL-SCNC: 97 MMOL/L (ref 98–107)
CHOLEST SERPL-MCNC: 164 MG/DL (ref 0–200)
CO2 SERPL-SCNC: 30.3 MMOL/L (ref 22–29)
COLOR UR: YELLOW
CREAT SERPL-MCNC: 0.89 MG/DL (ref 0.76–1.27)
CREAT UR-MCNC: 97.1 MG/DL
EOSINOPHIL # BLD AUTO: 0.15 10*3/MM3 (ref 0–0.4)
EOSINOPHIL NFR BLD AUTO: 2.1 % (ref 0.3–6.2)
EPI CELLS #/AREA URNS HPF: NORMAL /HPF
ERYTHROCYTE [DISTWIDTH] IN BLOOD BY AUTOMATED COUNT: 13 % (ref 12.3–15.4)
GLOBULIN SER CALC-MCNC: 2.5 GM/DL
GLUCOSE SERPL-MCNC: 130 MG/DL (ref 65–99)
GLUCOSE UR QL: NEGATIVE
HBA1C MFR BLD: 6 % (ref 4.8–5.6)
HCT VFR BLD AUTO: 48.5 % (ref 37.5–51)
HDLC SERPL-MCNC: 52 MG/DL (ref 40–60)
HGB BLD-MCNC: 16 G/DL (ref 13–17.7)
HGB UR QL STRIP: NEGATIVE
IMM GRANULOCYTES # BLD AUTO: 0.02 10*3/MM3 (ref 0–0.05)
IMM GRANULOCYTES NFR BLD AUTO: 0.3 % (ref 0–0.5)
KETONES UR QL STRIP: NEGATIVE
LDLC SERPL CALC-MCNC: 97 MG/DL (ref 0–100)
LEUKOCYTE ESTERASE UR QL STRIP: NEGATIVE
LYMPHOCYTES # BLD AUTO: 1.5 10*3/MM3 (ref 0.7–3.1)
LYMPHOCYTES NFR BLD AUTO: 20.7 % (ref 19.6–45.3)
MCH RBC QN AUTO: 30.9 PG (ref 26.6–33)
MCHC RBC AUTO-ENTMCNC: 33 G/DL (ref 31.5–35.7)
MCV RBC AUTO: 93.6 FL (ref 79–97)
MICRO URNS: NORMAL
MICRO URNS: NORMAL
MICROALBUMIN UR-MCNC: 19.2 UG/ML
MONOCYTES # BLD AUTO: 0.72 10*3/MM3 (ref 0.1–0.9)
MONOCYTES NFR BLD AUTO: 9.9 % (ref 5–12)
MUCOUS THREADS URNS QL MICRO: PRESENT /HPF
NEUTROPHILS # BLD AUTO: 4.83 10*3/MM3 (ref 1.4–7)
NEUTROPHILS NFR BLD AUTO: 66.7 % (ref 42.7–76)
NITRITE UR QL STRIP: NEGATIVE
NRBC BLD AUTO-RTO: 0 /100 WBC (ref 0–0)
PH UR STRIP: 6 [PH] (ref 5–7.5)
PLATELET # BLD AUTO: 238 10*3/MM3 (ref 140–450)
POTASSIUM SERPL-SCNC: 4.3 MMOL/L (ref 3.5–5.2)
PROT SERPL-MCNC: 6.6 G/DL (ref 6–8.5)
PROT UR QL STRIP: NEGATIVE
PSA SERPL-MCNC: 1.04 NG/ML (ref 0–4)
RBC # BLD AUTO: 5.18 10*6/MM3 (ref 4.14–5.8)
RBC #/AREA URNS HPF: NORMAL /HPF
SODIUM SERPL-SCNC: 140 MMOL/L (ref 136–145)
SP GR UR: 1.02 (ref 1–1.03)
TRIGL SERPL-MCNC: 75 MG/DL (ref 0–150)
TSH SERPL DL<=0.005 MIU/L-ACNC: 1.39 MIU/ML (ref 0.27–4.2)
URINALYSIS REFLEX: NORMAL
UROBILINOGEN UR STRIP-MCNC: 0.2 MG/DL (ref 0.2–1)
VLDLC SERPL CALC-MCNC: 15 MG/DL (ref 5–40)
WBC # BLD AUTO: 7.24 10*3/MM3 (ref 3.4–10.8)
WBC #/AREA URNS HPF: NORMAL /HPF

## 2019-03-07 ENCOUNTER — OFFICE VISIT (OUTPATIENT)
Dept: INTERNAL MEDICINE | Facility: CLINIC | Age: 79
End: 2019-03-07

## 2019-03-07 VITALS
HEART RATE: 54 BPM | DIASTOLIC BLOOD PRESSURE: 70 MMHG | OXYGEN SATURATION: 96 % | HEIGHT: 70 IN | BODY MASS INDEX: 23.77 KG/M2 | SYSTOLIC BLOOD PRESSURE: 120 MMHG | WEIGHT: 166 LBS

## 2019-03-07 DIAGNOSIS — E78.2 MIXED HYPERLIPIDEMIA: ICD-10-CM

## 2019-03-07 DIAGNOSIS — I45.10 RBBB: ICD-10-CM

## 2019-03-07 DIAGNOSIS — E11.9 TYPE 2 DIABETES MELLITUS WITHOUT COMPLICATION, UNSPECIFIED WHETHER LONG TERM INSULIN USE (HCC): ICD-10-CM

## 2019-03-07 DIAGNOSIS — E55.9 VITAMIN D DEFICIENCY: ICD-10-CM

## 2019-03-07 DIAGNOSIS — Z00.00 MEDICARE ANNUAL WELLNESS VISIT, SUBSEQUENT: Primary | ICD-10-CM

## 2019-03-07 DIAGNOSIS — M54.50 BILATERAL LOW BACK PAIN WITHOUT SCIATICA, UNSPECIFIED CHRONICITY: ICD-10-CM

## 2019-03-07 DIAGNOSIS — I10 ESSENTIAL HYPERTENSION: ICD-10-CM

## 2019-03-07 PROCEDURE — 72110 X-RAY EXAM L-2 SPINE 4/>VWS: CPT | Performed by: INTERNAL MEDICINE

## 2019-03-07 PROCEDURE — 96160 PT-FOCUSED HLTH RISK ASSMT: CPT | Performed by: INTERNAL MEDICINE

## 2019-03-07 PROCEDURE — 93000 ELECTROCARDIOGRAM COMPLETE: CPT | Performed by: INTERNAL MEDICINE

## 2019-03-07 PROCEDURE — G0439 PPPS, SUBSEQ VISIT: HCPCS | Performed by: INTERNAL MEDICINE

## 2019-03-07 PROCEDURE — 99214 OFFICE O/P EST MOD 30 MIN: CPT | Performed by: INTERNAL MEDICINE

## 2019-03-07 NOTE — PROGRESS NOTES
Subjective     Calos Lam is a 78 y.o. male who presents for an annual wellness visit as well as check up of dm-2, htn, hld.        History of Present Illness     Dm-2.  Good BS control.     HTN.  Control is excellent.   HLD.  He is maintained on pravastatin with good control.    C/o LBP.  Going on several months.  No radiation.  Occurs with getting out of bed.  Better with exertion.      Review of Systems   Respiratory: Negative.    Cardiovascular: Negative.        The following portions of the patient's history were reviewed and updated as appropriate: allergies, current medications, past family history, past medical history, past social history, past surgical history and problem list.  Health maintenance tab was reviewed and updated with the patient.       Patient Active Problem List    Diagnosis Date Noted   • Vitamin D deficiency 03/07/2019   • Incomplete RBBB 03/07/2019   • PLMD (periodic limb movement disorder) 02/10/2018   • Colon polyp 07/25/2017     Note Last Updated: 8/31/2017     Overview:   Added automatically from request for surgery [id number]     • History of bladder cancer 02/18/2017   • RLS (restless legs syndrome) 02/18/2017   • Type 2 diabetes mellitus without complication (CMS/[facility]) 02/15/2017   • Essential hypertension    • Mixed hyperlipidemia    • Frontotemporal dementia 01/18/2017   • FIORELLA on CPAP 12/11/2016   • Sleep-related hypoventilation/hypoxia  12/11/2016       Past Medical History:   Diagnosis Date   • Essential hypertension    • GERD (gastroesophageal reflux disease)    • Impacted cerumen    • Impotence of organic origin    • Malaise and fatigue    • Mantoux: positive    • Memory loss    • Mixed hyperlipidemia    • Organic sleep apnea    • Pain in thoracic spine    • Primary malignant neoplasm of bladder (CMS/[facility]) 09/2009   • Restless leg syndrome, uncontrolled    • Testicular hypofunction    • Vertigo    • Vitamin D deficiency        Past Surgical History:   Procedure Laterality Date    • APPENDECTOMY     • COLON RESECTION LEFT Left    • COLONOSCOPY  2012    normal   • EYE SURGERY      lasik. Dr. Storey   • LASIK Bilateral    • ROOT CANAL     • TURP / TRANSURETHRAL INCISION / DRAINAGE PROSTATE  1980       Family History   Problem Relation Age of Onset   • Diabetes Sister    • COPD Sister    • Diabetes Brother    • Heart disease Brother    • Hypertension Brother    • Dementia Maternal Grandmother    • Alcohol abuse Neg Hx        Social History     Socioeconomic History   • Marital status:      Spouse name: Not on file   • Number of children: 1   • Years of education: Not on file   • Highest education level: Not on file   Social Needs   • Financial resource strain: Not on file   • Food insecurity - worry: Not on file   • Food insecurity - inability: Not on file   • Transportation needs - medical: Not on file   • Transportation needs - non-medical: Not on file   Occupational History   • Occupation: [employer]   Tobacco Use   • Smoking status: Former Smoker     Types: Cigarettes     Last attempt to quit: 1973     Years since quittin.2   • Smokeless tobacco: Never Used   Substance and Sexual Activity   • Alcohol use: No   • Drug use: No   • Sexual activity: Not on file   Other Topics Concern   • Not on file   Social History Narrative   • Not on file       Current Outpatient Medications on File Prior to Visit   Medication Sig Dispense Refill   • aliskiren (TEKTURNA) 300 MG tablet Tekturna 300 mg tablet   take 1 tablet by mouth once daily     • amLODIPine (NORVASC) 10 MG tablet TAKE ONE TABLET BY MOUTH ONE TIME DAILY  90 tablet 2   • ASPIRIN 81 PO Take  by mouth Daily.     • BYSTOLIC 10 MG tablet TAKE ONE TABLET BY MOUTH ONE TIME DAILY  90 tablet 2   •  MG tablet      • losartan-hydrochlorothiazide (HYZAAR) 50-12.5 MG per tablet Take 1 tablet by mouth Daily. 90 tablet 3   • omeprazole (PRILOSEC) 20 MG capsule Prilosec 20 mg capsule,delayed release    "Take 1 capsule every day by oral route.     • pramipexole (MIRAPEX) 0.25 MG tablet Take 2 tablets by mouth Every Night. 60 tablet 3   • pravastatin (PRAVACHOL) 40 MG tablet TAKE ONE TABLET BY MOUTH ONE TIME DAILY  90 tablet 2   • rivastigmine (EXELON) 13.3 MG/24HR patch Place 1 patch on the skin as directed by provider Daily. 90 patch 3   • solifenacin (VESICARE) 10 MG tablet Vesicare 10 mg tablet   Take 1 tablet every day by oral route.     • tadalafil (CIALIS) 20 MG tablet Cialis 20 mg tablet   Take 1 tablet every day by oral route as needed.     • tamsulosin (FLOMAX) 0.4 MG capsule 24 hr capsule tamsulosin 0.4 mg capsule     • vitamin D (ERGOCALCIFEROL) 83069 units capsule capsule TAKE ONE CAPSULE BY MOUTH WEEKLY  12 capsule 2   • [DISCONTINUED] HYDROcodone-acetaminophen (NORCO) 5-325 MG per tablet hydrocodone 5 mg-acetaminophen 325 mg tablet     • [DISCONTINUED] nebivolol (BYSTOLIC) 5 MG tablet Every 12 (Twelve) Hours.     • [DISCONTINUED] pravastatin (PRAVACHOL) 40 MG tablet pravastatin 40 mg tablet       No current facility-administered medications on file prior to visit.        Allergies   Allergen Reactions   • Beta Adrenergic Blockers    • Sulfa Antibiotics        Immunization History   Administered Date(s) Administered   • Flu Vaccine High Dose PF 65YR+ 10/13/2017   • Hepatitis A 01/01/2019   • Influenza TIV (IM) 10/11/2014, 11/15/2015, 11/08/2016   • Pneumococcal Conjugate 13-Valent (PCV13) 02/15/2017   • Pneumococcal Polysaccharide (PPSV23) 02/28/2018   • Td, Not Adsorbed 05/04/2014   • Zostavax 01/01/2013       Objective     /70   Pulse 54   Ht 177.8 cm (70\")   Wt 75.3 kg (166 lb)   SpO2 96%   BMI 23.82 kg/m²     Physical Exam   Constitutional: He is oriented to person, place, and time. He appears well-developed and well-nourished.   HENT:   Head: Normocephalic and atraumatic.   Right Ear: Hearing and tympanic membrane normal.   Left Ear: Hearing and tympanic membrane normal.   Mouth/Throat: " No posterior oropharyngeal erythema.   Neck: Neck supple. No thyromegaly present.   Cardiovascular: Normal rate, regular rhythm and normal heart sounds.   No murmur heard.  Pulmonary/Chest: Effort normal and breath sounds normal.   Abdominal: Soft. He exhibits no distension. There is no hepatosplenomegaly. There is no tenderness.   Lymphadenopathy:     He has no cervical adenopathy.   Neurological: He is alert and oriented to person, place, and time.   Skin: Skin is warm and dry.   Psychiatric: He has a normal mood and affect. His speech is normal and behavior is normal. Judgment and thought content normal. Cognition and memory are normal.          ECG 12 Lead  Date/Time: 3/7/2019 1:57 PM  Performed by: Karlie Hart MD  Authorized by: Karlie Hart MD   Comparison: compared with previous ECG   Similar to previous ECG  Rhythm: sinus rhythm  Rate: normal  Conduction: right bundle branch block  ST Segments: ST segments normal  T Waves: T waves normal  QRS axis: normal  Other findings: non-specific ST-T wave changes    Clinical impression: abnormal EKG              Assessment/Plan   Calos was seen today for annual exam.    Diagnoses and all orders for this visit:    Medicare annual wellness visit, subsequent    Type 2 diabetes mellitus without complication, unspecified whether long term insulin use (CMS/[facility])    Essential hypertension  -     ECG 12 Lead    Mixed hyperlipidemia  -     ECG 12 Lead    Vitamin D deficiency  -     Vitamin D 25 Hydroxy    RBBB    Bilateral low back pain without sciatica, unspecified chronicity  -     XR Spine Lumbar 4+ View  -     Ambulatory Referral to Physical Therapy    X-rays of the L/S spine  performed today for following indication:   pain.  X-ray reveal nad.  There is no available x-ray for comparison.  X-ray sent to radiology for official interpretation and findings.      Discussion    AWV.  See scanned forms and/or computer for carrol history, PHQ-9, functional ability  questionnaire, cognitive impairment screening.  Direct observation of cognitive abilities:  The patient does not exhibit any impairment in cognitive abilities upon direct observation at today's visit.   These were all reviewed with the patient and the patient was provided with a personal prevention plan of service in patient instructions.  Patient was given advice or handouts on the following topics:  nutrition, exercise  DM-2. Continue healthy diet.    HTN. Continue current regimen.   HLD.  Continue pravastatin.    RBBB.  Likely secondary to FIORELLA.  Stable.  LBP.  I discussed with the patient a trial of conservative management with:   physical therapy.  Let me know if not feeling better over the next several weeks or if there is any change in symptoms.  I have recommended that the patient get the following immunizations:  Shingrix.        Health Maintenance   Topic Date Due   • ZOSTER VACCINE (2 of 3) 02/26/2013   • TDAP/TD VACCINES (1 - Tdap) 05/05/2014   • HEMOGLOBIN A1C  08/27/2019   • LIPID PANEL  02/27/2020   • URINE MICROALBUMIN  02/27/2020   • MEDICARE ANNUAL WELLNESS  03/07/2020   • COLONOSCOPY  08/08/2022   • INFLUENZA VACCINE  Completed   • PNEUMOCOCCAL VACCINES (65+ LOW/MEDIUM RISK)  Completed            Future Appointments   Date Time Provider Department Center   8/1/2019 11:00 AM Presley Kyle APRN [facility] None   9/6/2019  9:00 AM [facility] None   9/11/2019  2:30 PM Karlie Hart MD [facility] None   12/5/2019 11:45 AM Julian Dahl MD [facility] None

## 2019-03-08 LAB — 25(OH)D3+25(OH)D2 SERPL-MCNC: 41.3 NG/ML (ref 30–100)

## 2019-03-14 ENCOUNTER — DOCUMENTATION (OUTPATIENT)
Dept: PHYSICAL THERAPY | Facility: HOSPITAL | Age: 79
End: 2019-03-14

## 2019-03-14 DIAGNOSIS — M79.602 LEFT ARM PAIN: Primary | ICD-10-CM

## 2019-03-14 NOTE — THERAPY DISCHARGE NOTE
Outpatient Physical Therapy Discharge Summary         Patient Name: Calos Lam  : 1940  MRN: [id number]    Today's Date: 3/14/2019    Visit Dx:    ICD-10-CM ICD-9-CM   1. Left arm pain M79.602 729.5       PT OP Goals     Row Name 19 1100          PT Short Term Goals    STG 1  Patient will demonstrate safety and independence with initial hEP  -     STG 1 Progress  Met  -     STG 2  Patient will increase left shoulder flex/abduction to 155 degrees or greater to reduce pain with overhead activities  -     STG 2 Progress  Met  -     STG 3  Patient will increase left shoulder flex/abduction strength to 4/5 or greater to improve stability with lifting household items  -     STG 3 Progress  Not Met  -        Long Term Goals    LTG 1  Patient will increase left shoulder flex/abduction to 160 degrees or greater to reduce pain with overhead activities  -     LTG 1 Progress  Not Met  -     LTG 2  Patient will reduce level of perceived disability on the SPADI from 28% disability to </=20% disability in order to improve quality of life  -     LTG 2 Progress  Not Met  -     LTG 3  Patient will reduce peak pain level with overheard reaching from 6/10 to 4/10   -     LTG 3 Progress  Met  -       User Key  (r) = Recorded By, (t) = Taken By, (c) = Cosigned By    Initials Name Provider Type    Jennifer Sawyer, PT Physical Therapist          OP PT Discharge Summary  Date of Discharge: 19  Reason for Discharge: Maximum functional potential achieved  Outcomes Achieved: Patient able to partially acheive established goals  Discharge Destination: Home with home program      Time Calculation:        Therapy Suggested Charges     Code   Minutes Charges    None                       Jennifer Brown PT  3/14/2019

## 2019-03-18 ENCOUNTER — TELEPHONE (OUTPATIENT)
Dept: INTERNAL MEDICINE | Facility: CLINIC | Age: 79
End: 2019-03-18

## 2019-03-18 DIAGNOSIS — M25.519 SHOULDER PAIN, UNSPECIFIED CHRONICITY, UNSPECIFIED LATERALITY: Primary | ICD-10-CM

## 2019-03-18 DIAGNOSIS — M54.2 NECK PAIN: ICD-10-CM

## 2019-03-28 ENCOUNTER — HOSPITAL ENCOUNTER (OUTPATIENT)
Dept: PHYSICAL THERAPY | Facility: HOSPITAL | Age: 79
Setting detail: THERAPIES SERIES
Discharge: HOME | End: 2019-03-28

## 2019-03-28 DIAGNOSIS — M77.8 LEFT SHOULDER TENDONITIS: ICD-10-CM

## 2019-03-28 DIAGNOSIS — M79.602 LEFT ARM PAIN: Primary | ICD-10-CM

## 2019-03-28 PROCEDURE — 97161 PT EVAL LOW COMPLEX 20 MIN: CPT

## 2019-03-28 PROCEDURE — 97110 THERAPEUTIC EXERCISES: CPT

## 2019-03-29 NOTE — THERAPY EVALUATION
Outpatient Physical Therapy Ortho Initial Evaluation  [facility]     Patient Name: Calos Lam  : 1940  MRN: [id number]  Today's Date: 3/29/2019      Visit Date: 2019    Patient Active Problem List   Diagnosis   • FIORELLA on CPAP   • Sleep-related hypoventilation/hypoxia    • Frontotemporal dementia   • Essential hypertension   • Mixed hyperlipidemia   • Type 2 diabetes mellitus without complication (CMS/HCC)   • History of bladder cancer   • RLS (restless legs syndrome)   • Colon polyp   • PLMD (periodic limb movement disorder)   • Vitamin D deficiency   • Incomplete RBBB        Past Medical History:   Diagnosis Date   • Essential hypertension    • GERD (gastroesophageal reflux disease)    • Impacted cerumen    • Impotence of organic origin    • Malaise and fatigue    • Mantoux: positive    • Memory loss    • Mixed hyperlipidemia    • Organic sleep apnea    • Pain in thoracic spine    • Primary malignant neoplasm of bladder (CMS/HCC) 2009   • Restless leg syndrome, uncontrolled    • Testicular hypofunction    • Vertigo    • Vitamin D deficiency         Past Surgical History:   Procedure Laterality Date   • APPENDECTOMY     • COLON RESECTION LEFT Left    • COLONOSCOPY  2012    normal   • EYE SURGERY      lasik. Dr. Storey   • LASIK Bilateral    • ROOT CANAL     • TURP / TRANSURETHRAL INCISION / DRAINAGE PROSTATE         Visit Dx:     ICD-10-CM ICD-9-CM   1. Left arm pain M79.602 729.5   2. Left shoulder tendonitis M75.82 726.10         Patient History     Row Name 19 1400             History    Chief Complaint  Pain;Joint stiffness;Muscle weakness  -DH (r) EM (t) DH (c)      Type of Pain  Upper Extremity / Arm  -DH (r) EM (t) DH (c)      Date Current Problem(s) Began  18  -DH (r) EM (t) DH (c)      Brief Description of Current Complaint  Pt reports acute exacerbation of L shoulder pain. Pt hurt shoulder in 2018 after moving sister into senior  living. Pt received steroid shot 2 months ago and found moderate relief of pain. Increased diffculty with reaching/dressing.   -DH (r) EM (t) DH (c)      Patient/Caregiver Goals  Relieve pain;Improve mobility;Improve strength  -DH (r) EM (t) DH (c)      Occupation/sports/leisure activities  gardening, working out, walking  -DH (r) EM (t) DH (c)      How has patient tried to help current problem?  NSAIDS, steroid shot  -DH (r) EM (t) DH (c)      What clinical tests have you had for this problem?  MRI  -DH (r) EM (t) DH (c)      Results of Clinical Tests  full-thickness supraspinatus tear, anterior-inferior labral involvement  -DH (r) EM (t) DH (c)         Pain     Pain Location  Shoulder  -DH (r) EM (t) DH (c)      Pain at Present  0  -DH (r) EM (t) DH (c)      Pain at Best  0  -DH (r) EM (t) DH (c)      Pain at Worst  3  -DH (r) EM (t) DH (c)      Pain Frequency  Intermittent  -DH (r) EM (t) DH (c)      Pain Description  Aching;Discomfort;Tender  -DH (r) EM (t) DH (c)      What Performance Factors Make the Current Problem(s) WORSE?  reaching, overhead activity  -DH (r) EM (t) DH (c)      What Performance Factors Make the Current Problem(s) BETTER?  rest   -DH (r) EM (t) DH (c)      Is your sleep disturbed?  No  -DH (r) EM (t) DH (c)      What position do you sleep in?  Right sidelying  -DH (r) EM (t) DH (c)      Difficulties with ADL's?  yes   -DH (r) EM (t) DH (c)      Difficulties with recreational activities?  yes  -DH (r) EM (t) DH (c)         Daily Activities    Primary Language  English  -DH (r) EM (t) DH (c)      Pt Participated in POC and Goals  Yes  -DH (r) EM (t) DH (c)         Safety    Are you being hurt, hit, or frightened by anyone at home or in your life?  No  -DH (r) EM (t) DH (c)      Are you being neglected by a caregiver  No  -DH (r) EM (t) DH (c)        User Key  (r) = Recorded By, (t) = Taken By, (c) = Cosigned By    Initials Name Provider Type    Darian Ngo, PT Physical Therapist    EM  Sunshine Peres, PT Student PT Student          PT Ortho     Row Name 03/28/19 9104       Subjective Pain    Able to rate subjective pain?  yes  -DH (r) EM (t) DH (c)    Pre-Treatment Pain Level  0  -DH (r) EM (t) DH (c)    Post-Treatment Pain Level  2  -DH (r) EM (t) DH (c)       Posture/Observations    Posture/Observations Comments  rounded shoulders, kyphotic thoracic spine, forward head  -DH (r) EM (t) DH (c)       Shoulder Impingement/Rotator Cuff Special Tests    Empty Can Test (RC Lesion)  Left:;Positive  -DH (r) EM (t) DH (c)    Drop Arm Test (Full Thickness RC Lesion)  Left:;Positive  -DH (r) EM (t) DH (c)    Speed's Test (LH of Biceps Lesion)  Left:;Positive  -DH (r) EM (t) DH (c)       Shoulder Girdle Palpation    Deltoid  Left:;Tender mild TTP supraspinatus/infraspinatus; mild TTP biceps tendon  -DH (r) EM (t) DH (c)       Left Upper Ext    Lt Shoulder Abduction AROM  125  -DH (r) EM (t) DH (c)    Lt Shoulder Flexion AROM  120  -DH (r) EM (t) DH (c)       MMT (Manual Muscle Testing)    General MMT Comments  L scaption 3+/5; R scaption 4/5  -DH (r) EM (t) DH (c)       MMT Right Upper Ext    Rt Shoulder Flexion MMT, Gross Movement  (4+/5) good plus  -DH (r) EM (t) DH (c)    Rt Shoulder ABduction MMT, Gross Movement  (4+/5) good plus  -DH (r) EM (t) DH (c)    Rt Shoulder External Rotation MMT, Gross Movement  (4+/5) good plus  -DH (r) EM (t) DH (c)       MMT Left Upper Ext    Lt Shoulder Flexion MMT, Gross Movement  (4-/5) good minus  -DH (r) EM (t) DH (c)    Lt Shoulder ABduction MMT, Gross Movement  (4-/5) good minus  -DH (r) EM (t) DH (c)    Lt Shoulder External Rotation MMT, Gross Movement  (3+/5) fair plus  -DH (r) EM (t) DH (c)       Sensation    Sensation WNL?  WNL  -DH (r) EM (t) DH (c)      User Key  (r) = Recorded By, (t) = Taken By, (c) = Cosigned By    Initials Name Provider Type    Darian Ngo, PT Physical Therapist    Sunshine Viera, PT Student PT Student                   [unfilled]          PT OP Goals     Row Name 03/28/19 1500          PT Short Term Goals    STG Date to Achieve  04/11/19  -DH (r) EM (t) DH (c)     STG 1  pt will be independent with HEP in 2 weeks.  -DH (r) EM (t) DH (c)     STG 2  Pt will demonstrate L shoulder flex/abd AAROM 150 degrees   -DH (r) EM (t) DH (c)     STG 3  Pt will report <2/10 pain in L shoulder with reaching exercises in 2 weeks.   -DH (r) EM (t) DH (c)        Long Term Goals    LTG Date to Achieve  04/28/19  -DH (r) EM (t) DH (c)     LTG 1  Pt will demonstrate L shoulder AAROM flex/abd >160 degrees in order to improve ease with dressing in 4 weeks.  -DH (r) EM (t) DH (c)     LTG 2  Pt will score >10% on SPADI in 4 weeks.  -DH (r) EM (t) DH (c)     LTG 3  Pt will report <2/10 pain in L shoulder with overhead activity in 4 weeks.  -DH (r) EM (t) DH (c)        Time Calculation    PT Goal Re-Cert Due Date  04/28/19  -DH (r) EM (t) DH (c)       User Key  (r) = Recorded By, (t) = Taken By, (c) = Cosigned By    Initials Name Provider Type     Darian Son, PT Physical Therapist    EM Sunshine Peres, PT Student PT Student          PT Assessment/Plan     Row Name 03/28/19 1517          PT Assessment    Functional Limitations  Limitation in home management;Limitations in community activities;Performance in leisure activities;Performance in self-care ADL;Performance in sport activities  -DH (r) EM (t) DH (c)     Impairments  Impaired muscle endurance;Impaired muscle power;Motor function;Range of motion;Posture;Joint mobility;Joint integrity;Pain;Muscle strength  -DH (r) EM (t) DH (c)     Assessment Comments  Calos Lam is a 79 yo male presenting to physical therapy with complaints of L shoulder pain secondary to full-thickness supraspinatus tear and labral involvement. Pt presents with a stable clinical condition. Pt has no comorbidities or personal factors that will affect his progress. Pt demonstrates decrease L shoulder  AROM, flex/abd MMT, and pain with reaching and dressing. Signs and symptoms are consistent with physical therapy diagnosis of full-thickness supraspinatus tear limiting dressing/bathing.   -DH (r) EM (t)  (c)     Please refer to paper survey for additional self-reported information  Yes  -DH (r) EM (t) DH (c)     Rehab Potential  Good  -DH (r) EM (t)  (c)     Patient/caregiver participated in establishment of treatment plan and goals  Yes  -DH (r) EM (t) DH (c)     Patient would benefit from skilled therapy intervention  Yes  -DH (r) EM (t)  (c)        PT Plan    PT Frequency  2x/week  -DH (r) EM (t)  (c)     Planned CPT's?  PT EVAL LOW COMPLEXITY: 96551;PT RE-EVAL: 94755;PT THER PROC EA 15 MIN: 59905;PT THER ACT EA 15 MIN: 99515;PT MANUAL THERAPY EA 15 MIN: 40392;PT NEUROMUSC RE-EDUCATION EA 15 MIN: 92824;PT SELF CARE/HOME MGMT/TRAIN EA 15: 75527;PT HOT OR COLD PACK TREAT MCARE;PT ELECTRICAL STIM UNATTEND: ;PT ULTRASOUND EA 15 MIN: 15829  -DH (r) EM (t)  (c)     Physical Therapy Interventions (Optional Details)  dry needling;fine motor skills;gross motor skills;home exercise program;joint mobilization;manual therapy techniques;modalities;motor coordination training;neuromuscular re-education;patient/family education;postural re-education;ROM (Range of Motion);strengthening;stretching  - (r) EM (t)  (c)       User Key  (r) = Recorded By, (t) = Taken By, (c) = Cosigned By    Initials Name Provider Type     Darian Son, PT Physical Therapist    EM Sunshine Peres, PT Student PT Student            Exercises     Row Name 03/28/19 1500             Subjective Pain    Able to rate subjective pain?  yes  -DH (r) EM (t)  (c)      Pre-Treatment Pain Level  0  -DH (r) EM (t)  (c)      Post-Treatment Pain Level  2  -DH (r) EM (t)  (c)         Total Minutes    72685 - PT Therapeutic Exercise Minutes  30  -DH (r) EM (t)  (c)         Exercise 1    Exercise Name 1  Emphasis on scapular  stabilizaiton and L shoulder AAROM/AROM  -DH (r) EM (t) DH (c)        User Key  (r) = Recorded By, (t) = Taken By, (c) = Cosigned By    Initials Name Provider Type    Darian Ngo, PT Physical Therapist    EM Sunshine Peres, PT Student PT Student                                  Time Calculation:     Start Time: 1330  Stop Time: 1415  Time Calculation (min): 45 min                   Darian Son, PT  3/29/2019

## 2019-04-02 ENCOUNTER — HOSPITAL ENCOUNTER (OUTPATIENT)
Dept: PHYSICAL THERAPY | Facility: HOSPITAL | Age: 79
Setting detail: THERAPIES SERIES
Discharge: HOME | End: 2019-04-02

## 2019-04-02 DIAGNOSIS — M77.8 LEFT SHOULDER TENDONITIS: ICD-10-CM

## 2019-04-02 DIAGNOSIS — M79.602 LEFT ARM PAIN: Primary | ICD-10-CM

## 2019-04-02 PROCEDURE — 97140 MANUAL THERAPY 1/> REGIONS: CPT | Performed by: PHYSICAL THERAPIST

## 2019-04-02 PROCEDURE — 97110 THERAPEUTIC EXERCISES: CPT | Performed by: PHYSICAL THERAPIST

## 2019-04-02 NOTE — THERAPY TREATMENT NOTE
Outpatient Physical Therapy Ortho Treatment Note  [facility]     Patient Name: Calos Lam  : 1940  MRN: [id number]  Today's Date: 2019      Visit Date: 2019    Visit Dx:    ICD-10-CM ICD-9-CM   1. Left arm pain M79.602 729.5   2. Left shoulder tendonitis M75.82 726.10       Patient Active Problem List   Diagnosis   • FIORELLA on CPAP   • Sleep-related hypoventilation/hypoxia    • Frontotemporal dementia   • Essential hypertension   • Mixed hyperlipidemia   • Type 2 diabetes mellitus without complication (CMS/HCC)   • History of bladder cancer   • RLS (restless legs syndrome)   • Colon polyp   • PLMD (periodic limb movement disorder)   • Vitamin D deficiency   • Incomplete RBBB        Past Medical History:   Diagnosis Date   • Essential hypertension    • GERD (gastroesophageal reflux disease)    • Impacted cerumen    • Impotence of organic origin    • Malaise and fatigue    • Mantoux: positive    • Memory loss    • Mixed hyperlipidemia    • Organic sleep apnea    • Pain in thoracic spine    • Primary malignant neoplasm of bladder (CMS/HCC) 2009   • Restless leg syndrome, uncontrolled    • Testicular hypofunction    • Vertigo    • Vitamin D deficiency         Past Surgical History:   Procedure Laterality Date   • APPENDECTOMY     • COLON RESECTION LEFT Left    • COLONOSCOPY  2012    normal   • EYE SURGERY      lasik. Dr. Storey   • LASIK Bilateral    • ROOT CANAL     • TURP / TRANSURETHRAL INCISION / DRAINAGE PROSTATE  1980       PT Assessment/Plan     Row Name 19 1334          PT Assessment    Assessment Comments  Pj tolerated treatment well.  He is slowly progressing with therapeutic exercises to maximize function with the L shoulder.  -MP        PT Plan    PT Plan Comments  Monitor the effects of today's treatment and progress as indicated.  -MP       User Key  (r) = Recorded By, (t) = Taken By, (c) = Cosigned By    Initials Name Provider Type    IAN Steven  Eduardo, PT Physical Therapist            Exercises     Row Name 04/02/19 1300             Subjective Comments    Subjective Comments  Pj reports that his biggest problem continues to be elevation.  -MP         Subjective Pain    Able to rate subjective pain?  yes  -MP      Pre-Treatment Pain Level  0  -MP      Post-Treatment Pain Level  0  -MP         Total Minutes    72212 - PT Therapeutic Exercise Minutes  35  -MP      03460 - PT Manual Therapy Minutes  10  -MP         Exercise 1    Exercise Name 1  Therapeutic exercises included lying wand ER, 10s x 10, lying assisted flexion 10s x 10, standing pulldown, neutral , red theraband, 10 x 2, standing row, supinated, red theraband, 10 x 2, ER walkouts, red theraband, 10 x 2 and pulley flexion/scaption, x 10 each in sitting.  -MP      Cueing 1  Verbal;Tactile  -MP      Additional Comments  Cues provided for exercise technique.  He has a tendency to let the forearm drift from neutral with the ER walkouts.  -MP        User Key  (r) = Recorded By, (t) = Taken By, (c) = Cosigned By    Initials Name Provider Type    Eduardo Gallegos PT Physical Therapist             Manual Rx (last 36 hours)      Manual Treatments     Row Name 04/02/19 1300             Total Minutes    51041 - PT Manual Therapy Minutes  10  -MP         Manual Rx 1    Manual Rx 1 Location  L shoulder  -MP      Manual Rx 1 Type  PROM  -MP      Manual Rx 1 Grade  Flexion, ER and IR  -MP      Manual Rx 1 Duration  8 minutes  -MP         Manual Rx 2    Manual Rx 2 Location  L GH joint  -MP      Manual Rx 2 Type  Glides, anterior, posterior and inferior  -MP      Manual Rx 2 Grade  3-4  -MP      Manual Rx 2 Duration  X 5 each  -MP        User Key  (r) = Recorded By, (t) = Taken By, (c) = Cosigned By    Initials Name Provider Type    Eduardo Gallegos PT Physical Therapist          PT OP Goals     Row Name 04/02/19 1300          PT Short Term Goals    STG Date to Achieve  04/11/19  -MP     STG 1  pt will be  independent with HEP in 2 weeks.  -MP     STG 2  Pt will demonstrate L shoulder flex/abd AAROM 150 degrees   -MP     STG 3  Pt will report <2/10 pain in L shoulder with reaching exercises in 2 weeks.   -MP        Long Term Goals    LTG Date to Achieve  04/28/19  -MP     LTG 1  Pt will demonstrate L shoulder AAROM flex/abd >160 degrees in order to improve ease with dressing in 4 weeks.  -MP     LTG 2  Pt will score >10% on SPADI in 4 weeks.  -MP     LTG 3  Pt will report <2/10 pain in L shoulder with overhead activity in 4 weeks.  -MP       User Key  (r) = Recorded By, (t) = Taken By, (c) = Cosigned By    Initials Name Provider Type    Eduardo Gallegos PT Physical Therapist          Therapy Education  Education Details: Lying AAROM exercises, wand ER and flexion, were added to his HEP.  Given: HEP, Symptoms/condition management  Program: New  How Provided: Written  Provided to: Patient  Level of Understanding: Teach back education performed     Time Calculation:   Start Time: 1115  Stop Time: 1200  Time Calculation (min): 45 min  Therapy Charges for Today     Code Description Service Date Service Provider Modifiers Qty    98275337756 HC PT THER PROC EA 15 MIN 4/2/2019 Eduardo Steven, PT GP 2    04894561768 HC PT MANUAL THERAPY EA 15 MIN 4/2/2019 Eduardo Steven, PT GP 1          Eduardo Steven PT  4/2/2019

## 2019-04-17 ENCOUNTER — HOSPITAL ENCOUNTER (OUTPATIENT)
Dept: PHYSICAL THERAPY | Facility: HOSPITAL | Age: 79
Setting detail: THERAPIES SERIES
Discharge: HOME | End: 2019-04-17

## 2019-04-17 DIAGNOSIS — M77.8 LEFT SHOULDER TENDONITIS: ICD-10-CM

## 2019-04-17 DIAGNOSIS — M79.602 LEFT ARM PAIN: Primary | ICD-10-CM

## 2019-04-17 PROCEDURE — 97140 MANUAL THERAPY 1/> REGIONS: CPT

## 2019-04-17 PROCEDURE — 97110 THERAPEUTIC EXERCISES: CPT

## 2019-04-17 NOTE — THERAPY TREATMENT NOTE
Outpatient Physical Therapy Ortho Treatment Note  [facility]     Patient Name: Calos Lam  : 1940  MRN: [id number]  Today's Date: 2019      Visit Date: 2019    Visit Dx:    ICD-10-CM ICD-9-CM   1. Left arm pain M79.602 729.5   2. Left shoulder tendonitis M75.82 726.10       Patient Active Problem List   Diagnosis   • FIORELLA on CPAP   • Sleep-related hypoventilation/hypoxia    • Frontotemporal dementia   • Essential hypertension   • Mixed hyperlipidemia   • Type 2 diabetes mellitus without complication (CMS/HCC)   • History of bladder cancer   • RLS (restless legs syndrome)   • Colon polyp   • PLMD (periodic limb movement disorder)   • Vitamin D deficiency   • Incomplete RBBB        Past Medical History:   Diagnosis Date   • Essential hypertension    • GERD (gastroesophageal reflux disease)    • Impacted cerumen    • Impotence of organic origin    • Malaise and fatigue    • Mantoux: positive    • Memory loss    • Mixed hyperlipidemia    • Organic sleep apnea    • Pain in thoracic spine    • Primary malignant neoplasm of bladder (CMS/HCC) 2009   • Restless leg syndrome, uncontrolled    • Testicular hypofunction    • Vertigo    • Vitamin D deficiency         Past Surgical History:   Procedure Laterality Date   • APPENDECTOMY     • COLON RESECTION LEFT Left    • COLONOSCOPY  2012    normal   • EYE SURGERY      lasik. Dr. Storey   • LASIK Bilateral    • ROOT CANAL     • TURP / TRANSURETHRAL INCISION / DRAINAGE PROSTATE  1980                       PT Assessment/Plan     Row Name 19 1200          PT Assessment    Assessment Comments  Mr. Lam tolerated treatment well today. He demos compensatory muscle activation during PROM, and unable to relax fully despite cues. Continue with AAROM and strengthening parascapular musculature and postural muscles.  -CA        PT Plan    PT Plan Comments  progress as indicated.  -CA       User Key  (r) = Recorded By, (t) =  Taken By, (c) = Cosigned By    Initials Name Provider Type    Cortney Altman PT Physical Therapist            Exercises     Row Name 04/17/19 1200 04/17/19 1100          Subjective Comments    Subjective Comments  I cont to have pain when lifting my arm overhead  -CA  --        Subjective Pain    Able to rate subjective pain?  yes  -CA  --     Pre-Treatment Pain Level  0  -CA  --        Total Minutes    34662 - PT Therapeutic Exercise Minutes  30  -CA  --     15543 - PT Manual Therapy Minutes  --  10  -CA        Exercise 1    Exercise Name 1  see flowsheet  -CA  --     Cueing 1  Verbal;Tactile;Demo  -CA  --       User Key  (r) = Recorded By, (t) = Taken By, (c) = Cosigned By    Initials Name Provider Type    Cortney Altman PT Physical Therapist                      Manual Rx (last 36 hours)      Manual Treatments     Row Name 04/17/19 1100             Total Minutes    15530 - PT Manual Therapy Minutes  10  -CA         Manual Rx 1    Manual Rx 1 Location  L shoulder  -CA      Manual Rx 1 Type  PROM  -CA      Manual Rx 1 Grade  Flexion, ER and IR  -CA      Manual Rx 1 Duration  8 minutes  -CA         Manual Rx 2    Manual Rx 2 Location  L GH joint  -CA      Manual Rx 2 Type  Glides, anterior, posterior and inferior  -CA      Manual Rx 2 Grade  3-4  -CA      Manual Rx 2 Duration  X 5 each  -CA        User Key  (r) = Recorded By, (t) = Taken By, (c) = Cosigned By    Initials Name Provider Type    Cortney Altman PT Physical Therapist                             Time Calculation:   Start Time: 1200  Stop Time: 1240  Time Calculation (min): 40 min  Total Timed Code Minutes- PT: 40 minute(s)  Therapy Charges for Today     Code Description Service Date Service Provider Modifiers Qty    96056255116 HC PT THER PROC EA 15 MIN 4/17/2019 Cortney Salgado, PT GP 2    93817654519 HC PT MANUAL THERAPY EA 15 MIN 4/17/2019 Cortney Salgado, PT GP 1                    Cortney Salgado PT  4/17/2019

## 2019-04-17 NOTE — PATIENT INSTRUCTIONS
Medicare Wellness  Personal Prevention Plan of Service     Date of Office Visit:  2019  Encounter Provider:  Karlie Hart MD  Place of Service:  [facility]  Patient Name: Calos Lam  :  1940    As part of the Medicare Wellness portion of your visit today, we are providing you with this personalized preventive plan of services (PPPS). This plan is based upon recommendations of the [place] Preventive Services Task Force (USPSTF) and the Advisory Committee on Immunization Practices (ACIP).    This lists the preventive care services that should be considered, and provides dates of when you are due. Items listed as completed are up-to-date and do not require any further intervention.    Health Maintenance   Topic Date Due   • ZOSTER VACCINE (2 of 3) 2013   • TDAP/TD VACCINES (1 - Tdap) 2014   • INFLUENZA VACCINE  2018   • MEDICARE ANNUAL WELLNESS  2019   • HEMOGLOBIN A1C  2019   • LIPID PANEL  2020   • URINE MICROALBUMIN  2020   • COLONOSCOPY  2022   • PNEUMOCOCCAL VACCINES (65+ LOW/MEDIUM RISK)  Completed       No orders of the defined types were placed in this encounter.      No Follow-up on file.         Neurosurgery Patient had low grade fever yesterday afternoon, but she has been afebrile since then. Her WBC has trended down. Plan is to proceed with Pleural shunt placement today. D/W Dr. Obi Stover and Dr. Michaela Stone, patient's daughter.  
 
Gwyn Hamman, NP

## 2019-04-19 ENCOUNTER — HOSPITAL ENCOUNTER (OUTPATIENT)
Dept: PHYSICAL THERAPY | Facility: HOSPITAL | Age: 79
Setting detail: THERAPIES SERIES
Discharge: HOME | End: 2019-04-19

## 2019-04-19 DIAGNOSIS — M77.8 LEFT SHOULDER TENDONITIS: ICD-10-CM

## 2019-04-19 DIAGNOSIS — M79.602 LEFT ARM PAIN: Primary | ICD-10-CM

## 2019-04-19 PROCEDURE — 97140 MANUAL THERAPY 1/> REGIONS: CPT | Performed by: PHYSICAL THERAPIST

## 2019-04-19 PROCEDURE — 97110 THERAPEUTIC EXERCISES: CPT | Performed by: PHYSICAL THERAPIST

## 2019-04-19 NOTE — THERAPY TREATMENT NOTE
Outpatient Physical Therapy Ortho Treatment Note  [facility]     Patient Name: Calos Lam  : 1940  MRN: [id number]  Today's Date: 2019      Visit Date: 2019    Visit Dx:    ICD-10-CM ICD-9-CM   1. Left arm pain M79.602 729.5   2. Left shoulder tendonitis M75.82 726.10       Patient Active Problem List   Diagnosis   • FIORELLA on CPAP   • Sleep-related hypoventilation/hypoxia    • Frontotemporal dementia   • Essential hypertension   • Mixed hyperlipidemia   • Type 2 diabetes mellitus without complication (CMS/HCC)   • History of bladder cancer   • RLS (restless legs syndrome)   • Colon polyp   • PLMD (periodic limb movement disorder)   • Vitamin D deficiency   • Incomplete RBBB        Past Medical History:   Diagnosis Date   • Essential hypertension    • GERD (gastroesophageal reflux disease)    • Impacted cerumen    • Impotence of organic origin    • Malaise and fatigue    • Mantoux: positive    • Memory loss    • Mixed hyperlipidemia    • Organic sleep apnea    • Pain in thoracic spine    • Primary malignant neoplasm of bladder (CMS/HCC) 2009   • Restless leg syndrome, uncontrolled    • Testicular hypofunction    • Vertigo    • Vitamin D deficiency         Past Surgical History:   Procedure Laterality Date   • APPENDECTOMY     • COLON RESECTION LEFT Left    • COLONOSCOPY  2012    normal   • EYE SURGERY      lasik. Dr. Storey   • LASIK Bilateral    • ROOT CANAL     • TURP / TRANSURETHRAL INCISION / DRAINAGE PROSTATE  1980       PT Assessment/Plan     Row Name 19 1034 19 1032       PT Assessment    Assessment Comments  --  Pj required monitoring for exercise technique.  He does not relax well with PROM and general technique with PREs is not good as well.    -MP       PT Plan    PT Plan Comments  He reported that the lumbar spine should have been part of his order and I will assess that next visit.  -MP  Monitor the effects of today's treatment and  progress as indicated.  -MP      User Key  (r) = Recorded By, (t) = Taken By, (c) = Cosigned By    Initials Name Provider Type    Eduardo Gallegos PT Physical Therapist            Exercises     Row Name 04/19/19 0900             Subjective Pain    Able to rate subjective pain?  yes  -MP      Pre-Treatment Pain Level  2  -MP      Post-Treatment Pain Level  1  -MP         Total Minutes    13027 - PT Therapeutic Exercise Minutes  35  -MP      77350 - PT Manual Therapy Minutes  10  -MP         Exercise 1    Exercise Name 1  Refer to land flow sheet  -MP      Cueing 1  Verbal;Tactile  -MP      Additional Comments  Cues for exercise technique.  Pj was repeatedly reminded to relax the L arm with stretching exercises and for postioning with the PREs.  He began a new exercise, sleeper stretch, 10s x 10.  -MP        User Key  (r) = Recorded By, (t) = Taken By, (c) = Cosigned By    Initials Name Provider Type    MP Eduardo Steven PT Physical Therapist              Manual Rx (last 36 hours)      Manual Treatments     Row Name 04/19/19 0900             Total Minutes    88496 - PT Manual Therapy Minutes  10  -MP         Manual Rx 1    Manual Rx 1 Location   L shoulder  -MP      Manual Rx 1 Type  PROM for flexion, ER and IR  -MP        User Key  (r) = Recorded By, (t) = Taken By, (c) = Cosigned By    Initials Name Provider Type    Eduardo Gallegos, PT Physical Therapist          PT OP Goals     Row Name 04/19/19 1000          PT Short Term Goals    STG Date to Achieve  04/11/19  -MP     STG 1  pt will be independent with HEP in 2 weeks.  -MP     STG 2  Pt will demonstrate L shoulder flex/abd AAROM 150 degrees   -MP     STG 3  Pt will report <2/10 pain in L shoulder with reaching exercises in 2 weeks.   -MP        Long Term Goals    LTG Date to Achieve  04/28/19  -MP     LTG 1  Pt will demonstrate L shoulder AAROM flex/abd >160 degrees in order to improve ease with dressing in 4 weeks.  -MP     LTG 2  Pt will score >10% on SPADI  in 4 weeks.  -MP     LTG 3  Pt will report <2/10 pain in L shoulder with overhead activity in 4 weeks.  -MP       User Key  (r) = Recorded By, (t) = Taken By, (c) = Cosigned By    Initials Name Provider Type    Eduardo Gallegos, PT Physical Therapist          Therapy Education  Education Details: Sleeper stretch was added to his HEP.  Given: HEP, Symptoms/condition management  Program: New  How Provided: Written  Provided to: Patient  Level of Understanding: Teach back education performed     Time Calculation:   Start Time: 0945  Stop Time: 1030  Time Calculation (min): 45 min  Therapy Charges for Today     Code Description Service Date Service Provider Modifiers Qty    49459082874  PT THER PROC EA 15 MIN 4/19/2019 Eduardo Steven, PT GP 2    [id number]  PT MANUAL THERAPY EA 15 MIN 4/19/2019 Eduardo Steven, PT GP 1          Eduardo Steven PT  4/19/2019

## 2019-04-22 ENCOUNTER — APPOINTMENT (OUTPATIENT)
Dept: PHYSICAL THERAPY | Facility: HOSPITAL | Age: 79
End: 2019-04-22

## 2019-04-24 ENCOUNTER — HOSPITAL ENCOUNTER (OUTPATIENT)
Dept: PHYSICAL THERAPY | Facility: HOSPITAL | Age: 79
Setting detail: THERAPIES SERIES
Discharge: HOME | End: 2019-04-24

## 2019-04-24 DIAGNOSIS — M79.602 LEFT ARM PAIN: Primary | ICD-10-CM

## 2019-04-24 DIAGNOSIS — M77.8 LEFT SHOULDER TENDONITIS: ICD-10-CM

## 2019-04-24 PROCEDURE — 97110 THERAPEUTIC EXERCISES: CPT | Performed by: PHYSICAL THERAPIST

## 2019-04-24 NOTE — THERAPY TREATMENT NOTE
Outpatient Physical Therapy Ortho Treatment Note  [facility]     Patient Name: Calos Lam  : 1940  MRN: [id number]  Today's Date: 2019      Visit Date: 2019    Visit Dx:    ICD-10-CM ICD-9-CM   1. Left arm pain M79.602 729.5   2. Left shoulder tendonitis M75.82 726.10       Patient Active Problem List   Diagnosis   • FIORELLA on CPAP   • Sleep-related hypoventilation/hypoxia    • Frontotemporal dementia   • Essential hypertension   • Mixed hyperlipidemia   • Type 2 diabetes mellitus without complication (CMS/HCC)   • History of bladder cancer   • RLS (restless legs syndrome)   • Colon polyp   • PLMD (periodic limb movement disorder)   • Vitamin D deficiency   • Incomplete RBBB        Past Medical History:   Diagnosis Date   • Essential hypertension    • GERD (gastroesophageal reflux disease)    • Impacted cerumen    • Impotence of organic origin    • Malaise and fatigue    • Mantoux: positive    • Memory loss    • Mixed hyperlipidemia    • Organic sleep apnea    • Pain in thoracic spine    • Primary malignant neoplasm of bladder (CMS/HCC) 2009   • Restless leg syndrome, uncontrolled    • Testicular hypofunction    • Vertigo    • Vitamin D deficiency         Past Surgical History:   Procedure Laterality Date   • APPENDECTOMY     • COLON RESECTION LEFT Left    • COLONOSCOPY  2012    normal   • EYE SURGERY      lasik. Dr. Storey   • LASIK Bilateral    • ROOT CANAL     • TURP / TRANSURETHRAL INCISION / DRAINAGE PROSTATE  1980       PT Assessment/Plan     Row Name 19 1516          PT Assessment    Assessment Comments  Pj's reports that he is beginning to notice improved function with the L UE.  He requires more than average amount of supervision to do the exercises correclty and needs cues for relaxation during manual therapy.  -MP        PT Plan    PT Plan Comments  Continue as indicated.  -MP       User Key  (r) = Recorded By, (t) = Taken By, (c) = Cosigned By     Initials Name Provider Type    Eduardo Gallegos, PT Physical Therapist            Exercises     Row Name 04/24/19 1400             Subjective Pain    Able to rate subjective pain?  yes  -MP      Pre-Treatment Pain Level  2  -MP      Post-Treatment Pain Level  1  -MP         Total Minutes    66690 - PT Therapeutic Exercise Minutes  25  -MP         Exercise 1    Exercise Name 1  Refer to land flow sheet  -MP      Cueing 1  Verbal;Tactile  -MP      Additional Comments  cues for exercise technique.  He progressed with shoulder wall slides x 10  -MP        User Key  (r) = Recorded By, (t) = Taken By, (c) = Cosigned By    Initials Name Provider Type    Eduardo Gallegos, PT Physical Therapist          PT OP Goals     Row Name 04/24/19 1500          PT Short Term Goals    STG Date to Achieve  04/11/19  -MP     STG 1  pt will be independent with HEP in 2 weeks.  -MP     STG 2  Pt will demonstrate L shoulder flex/abd AAROM 150 degrees   -MP     STG 3  Pt will report <2/10 pain in L shoulder with reaching exercises in 2 weeks.   -MP        Long Term Goals    LTG Date to Achieve  04/28/19  -MP     LTG 1  Pt will demonstrate L shoulder AAROM flex/abd >160 degrees in order to improve ease with dressing in 4 weeks.  -MP     LTG 2  Pt will score >10% on SPADI in 4 weeks.  -MP     LTG 3  Pt will report <2/10 pain in L shoulder with overhead activity in 4 weeks.  -MP       User Key  (r) = Recorded By, (t) = Taken By, (c) = Cosigned By    Initials Name Provider Type    Eduardo Gallegos PT Physical Therapist          Therapy Education  Education Details: Shoulder wall slides were added to his HEP.  Program: New  How Provided: Written  Provided to: Patient  Level of Understanding: Teach back education performed     Time Calculation:   Start Time: 1440  Stop Time: 1515  Time Calculation (min): 35 min  Therapy Charges for Today     Code Description Service Date Service Provider Modifiers Qty    04396532273 HC PT THER PROC EA 15 MIN  4/24/2019 Eduardo Steven, PT GP 2          Eduardo Steven, PT  4/24/2019

## 2019-04-30 ENCOUNTER — HOSPITAL ENCOUNTER (OUTPATIENT)
Dept: PHYSICAL THERAPY | Facility: HOSPITAL | Age: 79
Setting detail: THERAPIES SERIES
Discharge: HOME | End: 2019-04-30

## 2019-04-30 DIAGNOSIS — M77.8 LEFT SHOULDER TENDONITIS: ICD-10-CM

## 2019-04-30 DIAGNOSIS — M79.602 LEFT ARM PAIN: Primary | ICD-10-CM

## 2019-04-30 PROCEDURE — 97140 MANUAL THERAPY 1/> REGIONS: CPT | Performed by: PHYSICAL THERAPIST

## 2019-04-30 PROCEDURE — 97110 THERAPEUTIC EXERCISES: CPT | Performed by: PHYSICAL THERAPIST

## 2019-05-02 ENCOUNTER — HOSPITAL ENCOUNTER (OUTPATIENT)
Dept: PHYSICAL THERAPY | Facility: HOSPITAL | Age: 79
Setting detail: THERAPIES SERIES
Discharge: HOME | End: 2019-05-02

## 2019-05-02 PROCEDURE — 97110 THERAPEUTIC EXERCISES: CPT | Performed by: PHYSICAL THERAPIST

## 2019-05-02 NOTE — THERAPY DISCHARGE NOTE
Outpatient Physical Therapy Ortho Treatment Note/Discharge Summary  [facility]     Patient Name: Calos Lam  : 1940  MRN: [id number]  Today's Date: 2019      Visit Date: 2019    Visit Dx:  No diagnosis found.    Patient Active Problem List   Diagnosis   • FIORELLA on CPAP   • Sleep-related hypoventilation/hypoxia    • Frontotemporal dementia   • Essential hypertension   • Mixed hyperlipidemia   • Type 2 diabetes mellitus without complication (CMS/HCC)   • History of bladder cancer   • RLS (restless legs syndrome)   • Colon polyp   • PLMD (periodic limb movement disorder)   • Vitamin D deficiency   • Incomplete RBBB        Past Medical History:   Diagnosis Date   • Essential hypertension    • GERD (gastroesophageal reflux disease)    • Impacted cerumen    • Impotence of organic origin    • Malaise and fatigue    • Mantoux: positive    • Memory loss    • Mixed hyperlipidemia    • Organic sleep apnea    • Pain in thoracic spine    • Primary malignant neoplasm of bladder (CMS/HCC) 2009   • Restless leg syndrome, uncontrolled    • Testicular hypofunction    • Vertigo    • Vitamin D deficiency         Past Surgical History:   Procedure Laterality Date   • APPENDECTOMY     • COLON RESECTION LEFT Left    • COLONOSCOPY  2012    normal   • EYE SURGERY      lasik. Dr. Storey   • LASIK Bilateral    • ROOT CANAL     • TURP / TRANSURETHRAL INCISION / DRAINAGE PROSTATE         PT Ortho     Row Name 19 1100       General ROM    GENERAL ROM COMMENTS  Functional AROM of the L shoulder, standing, elevation in the scapular plane 145 degrees, reach behind the neck T1 spinous process and reach behind the back T12 spinous process.  All were pain free.    -MP       MMT (Manual Muscle Testing)    General MMT Comments  L shoulder, flexion 4+/5, abduction 4/5 with pain, ER 4-/5, IR 4+/5 and extension 4+ to 5/5.    -MP      User Key  (r) = Recorded By, (t) = Taken By, (c) = Cosigned  By    Initials Name Provider Type    Eduardo Gallegos, PT Physical Therapist            Exercises     Row Name 05/02/19 1700             Subjective Comments    Subjective Comments  Pj stated that he is ready to continue his rehab for the L shoulder independently.  -MP         Subjective Pain    Able to rate subjective pain?  yes  -MP      Pre-Treatment Pain Level  0  -MP      Post-Treatment Pain Level  0  -MP         Total Minutes    46304 - PT Therapeutic Exercise Minutes  40  -MP      10051 - PT Manual Therapy Minutes  5  -MP         Exercise 1    Exercise Name 1  Refer to land flow sheet  -MP      Cueing 1  Verbal;Tactile  -MP      Additional Comments  Progressed therapeutic exercises with L shooulder punch for flexion and scaption, each with a one pound dumbell, 10 x 2 B  -MP        User Key  (r) = Recorded By, (t) = Taken By, (c) = Cosigned By    Initials Name Provider Type    Eduardo Gallegos PT Physical Therapist             Manual Rx (last 36 hours)      Manual Treatments     Row Name 05/02/19 1700             Total Minutes    86565 - PT Manual Therapy Minutes  5  -MP         Manual Rx 1    Manual Rx 1 Location   L shoulder  -MP      Manual Rx 1 Type  PROM for flexion, ER and IR  -MP        User Key  (r) = Recorded By, (t) = Taken By, (c) = Cosigned By    Initials Name Provider Type    Eduardo Gallegos, PT Physical Therapist          PT OP Goals     Row Name 05/02/19 1700          PT Short Term Goals    STG Date to Achieve  04/11/19  -MP     STG 1  pt will be independent with HEP in 2 weeks.  -MP     STG 1 Progress  Met  -MP     STG 2  Pt will demonstrate L shoulder flex/abd AAROM 150 degrees   -MP     STG 2 Progress  Met  -MP     STG 3  Pt will report <2/10 pain in L shoulder with reaching exercises in 2 weeks.   -MP     STG 3 Progress  Met  -MP        Long Term Goals    LTG Date to Achieve  04/28/19  -MP     LTG 1  Pt will demonstrate L shoulder AAROM flex/abd >160 degrees in order to improve ease with  dressing in 4 weeks.  -MP     LTG 1 Progress  Met  -MP     LTG 2  Pt will score >10% on SPADI in 4 weeks.  -MP     LTG 2 Progress  Met  -MP     LTG 3  Pt will report <2/10 pain in L shoulder with overhead activity in 4 weeks.  -MP     LTG 3 Progress  Met  -MP       User Key  (r) = Recorded By, (t) = Taken By, (c) = Cosigned By    Initials Name Provider Type    Eduardo Gallegos, PT Physical Therapist          Therapy Education  Education Details: HEP progressed with theraband lat pulls, rowing, ER walk out and dumbell flexion and scaption at 90 degrees.  Red, green and blue theraband was issued.  Given: HEP, Symptoms/condition management  Program: New  How Provided: Written  Provided to: Patient  Level of Understanding: Teach back education performed     Time Calculation:   Start Time: 1515  Stop Time: 1600  Time Calculation (min): 45 min  Therapy Charges for Today     Code Description Service Date Service Provider Modifiers Qty    39928126025 HC PT THER PROC EA 15 MIN 5/2/2019 Eduardo Steven, PT GP 3        OP PT Discharge Summary  Date of Discharge: 05/02/19  Reason for Discharge: All goals achieved  Outcomes Achieved: Able to achieve all goals within established timeline  Discharge Destination: Home with home program  Discharge Instructions/Additional Comments: Pj is independent with a complete HEP and ready to try improving further independently.      Eduardo Steven PT  5/2/2019

## 2019-06-18 ENCOUNTER — OFFICE VISIT (OUTPATIENT)
Dept: INTERNAL MEDICINE | Facility: CLINIC | Age: 79
End: 2019-06-18

## 2019-06-18 VITALS
BODY MASS INDEX: 23.91 KG/M2 | SYSTOLIC BLOOD PRESSURE: 132 MMHG | WEIGHT: 167 LBS | OXYGEN SATURATION: 98 % | DIASTOLIC BLOOD PRESSURE: 64 MMHG | HEART RATE: 63 BPM | HEIGHT: 70 IN

## 2019-06-18 DIAGNOSIS — F02.80 FRONTOTEMPORAL DEMENTIA (HCC): Primary | ICD-10-CM

## 2019-06-18 DIAGNOSIS — Z02.4 ENCOUNTER FOR EXAMINATION FOR DRIVING LICENSE: ICD-10-CM

## 2019-06-18 DIAGNOSIS — G31.09 FRONTOTEMPORAL DEMENTIA (HCC): Primary | ICD-10-CM

## 2019-06-18 PROCEDURE — 99213 OFFICE O/P EST LOW 20 MIN: CPT | Performed by: INTERNAL MEDICINE

## 2019-06-18 NOTE — PROGRESS NOTES
Subjective     Calos Lam is a 78 y.o. male who presents with   Chief Complaint   Patient presents with   • Paper Work   • Dementia       History of Present Illness     Patient is here to discuss [facility] driving evaluation.  He underwent a full evaluation given his diagnosis of FTD.  He was deemed still able to drive with the following restrictions:  No interstate driving.  I am comfortable with this.     Review of Systems   Respiratory: Negative.    Cardiovascular: Negative.        The following portions of the patient's history were reviewed and updated as appropriate: allergies, current medications and problem list.    Patient Active Problem List    Diagnosis Date Noted   • Vitamin D deficiency 03/07/2019   • Incomplete RBBB 03/07/2019   • PLMD (periodic limb movement disorder) 02/10/2018   • Colon polyp 07/25/2017     Note Last Updated: 8/31/2017     Overview:   Added automatically from request for surgery [id number]     • History of bladder cancer 02/18/2017   • RLS (restless legs syndrome) 02/18/2017   • Type 2 diabetes mellitus without complication (CMS/[facility]) 02/15/2017   • Essential hypertension    • Mixed hyperlipidemia    • Frontotemporal dementia 01/18/2017   • FIORELLA on CPAP 12/11/2016   • Sleep-related hypoventilation/hypoxia  12/11/2016       Current Outpatient Medications on File Prior to Visit   Medication Sig Dispense Refill   • aliskiren (TEKTURNA) 300 MG tablet Tekturna 300 mg tablet   take 1 tablet by mouth once daily     • amLODIPine (NORVASC) 10 MG tablet TAKE ONE TABLET BY MOUTH ONE TIME DAILY  90 tablet 2   • ASPIRIN 81 PO Take  by mouth Daily.     • BYSTOLIC 10 MG tablet TAKE ONE TABLET BY MOUTH ONE TIME DAILY  90 tablet 2   •  MG tablet      • losartan-hydrochlorothiazide (HYZAAR) 50-12.5 MG per tablet Take 1 tablet by mouth Daily. 90 tablet 3   • omeprazole (PRILOSEC) 20 MG capsule Prilosec 20 mg capsule,delayed release   Take 1 capsule every day by oral route.     • pramipexole  "(MIRAPEX) 0.25 MG tablet Take 2 tablets by mouth Every Night. 60 tablet 3   • pravastatin (PRAVACHOL) 40 MG tablet TAKE ONE TABLET BY MOUTH ONE TIME DAILY  90 tablet 2   • rivastigmine (EXELON) 13.3 MG/24HR patch Place 1 patch on the skin as directed by provider Daily. 90 patch 3   • solifenacin (VESICARE) 10 MG tablet Vesicare 10 mg tablet   Take 1 tablet every day by oral route.     • tadalafil (CIALIS) 20 MG tablet Cialis 20 mg tablet   Take 1 tablet every day by oral route as needed.     • tamsulosin (FLOMAX) 0.4 MG capsule 24 hr capsule tamsulosin 0.4 mg capsule     • vitamin D (ERGOCALCIFEROL) 77503 units capsule capsule TAKE ONE CAPSULE BY MOUTH WEEKLY  12 capsule 2     No current facility-administered medications on file prior to visit.        Objective     /64   Pulse 63   Ht 177.8 cm (70\")   Wt 75.8 kg (167 lb)   SpO2 98%   BMI 23.96 kg/m²     Physical Exam   Constitutional: He is oriented to person, place, and time. He appears well-developed and well-nourished.   HENT:   Head: Atraumatic.   Neurological: He is alert and oriented to person, place, and time.   Psychiatric: He has a normal mood and affect.       Assessment/Plan   Calos was seen today for paper work and dementia.    Diagnoses and all orders for this visit:    Frontotemporal dementia    Encounter for examination for driving license        Discussion    Patient presents in f/u of FTD.  He just had a full driving evaluation.  His limitation is no interstate driving.  The patient is in agreement with this.  I am in agreement with this.  Paperwork is completed.  Copy for chart.  10/15 minutes was spent in counseling of the following topics:, test results, impressions             Future Appointments   Date Time Provider Department Center   8/1/2019 11:00 AM Presley Kyle APRN [facility] None   9/6/2019  9:00 AM [facility] None   9/11/2019  2:30 PM Karlie Hart MD [facility] None   12/5/2019 11:45 AM " Julian Dahl MD [facility] None

## 2019-06-19 RX ORDER — NEBIVOLOL HYDROCHLORIDE 10 MG/1
TABLET ORAL
Qty: 90 TABLET | Refills: 1 | Status: SHIPPED | OUTPATIENT
Start: 2019-06-19 | End: 2020-02-06

## 2019-06-19 RX ORDER — LOSARTAN POTASSIUM AND HYDROCHLOROTHIAZIDE 12.5; 5 MG/1; MG/1
TABLET ORAL
Qty: 90 TABLET | Refills: 2 | Status: SHIPPED | OUTPATIENT
Start: 2019-06-19 | End: 2020-04-07

## 2019-07-10 RX ORDER — PRAVASTATIN SODIUM 40 MG
TABLET ORAL
Qty: 90 TABLET | Refills: 1 | Status: SHIPPED | OUTPATIENT
Start: 2019-07-10 | End: 2020-01-21

## 2019-08-01 ENCOUNTER — OFFICE VISIT (OUTPATIENT)
Dept: NEUROLOGY | Facility: CLINIC | Age: 79
End: 2019-08-01

## 2019-08-01 VITALS
DIASTOLIC BLOOD PRESSURE: 70 MMHG | HEIGHT: 70 IN | OXYGEN SATURATION: 97 % | HEART RATE: 54 BPM | SYSTOLIC BLOOD PRESSURE: 102 MMHG | BODY MASS INDEX: 23.48 KG/M2 | WEIGHT: 164 LBS

## 2019-08-01 DIAGNOSIS — G31.09 FRONTOTEMPORAL DEMENTIA (HCC): Primary | ICD-10-CM

## 2019-08-01 DIAGNOSIS — F02.80 FRONTOTEMPORAL DEMENTIA (HCC): Primary | ICD-10-CM

## 2019-08-01 PROCEDURE — 99213 OFFICE O/P EST LOW 20 MIN: CPT | Performed by: NURSE PRACTITIONER

## 2019-08-01 RX ORDER — RIVASTIGMINE 13.3 MG/24H
1 PATCH, EXTENDED RELEASE TRANSDERMAL DAILY
Qty: 90 PATCH | Refills: 3 | Status: SHIPPED | OUTPATIENT
Start: 2019-08-01 | End: 2020-05-19 | Stop reason: SDUPTHER

## 2019-08-01 RX ORDER — HEPATITIS A VACCINE 1440 [IU]/ML
INJECTION, SUSPENSION INTRAMUSCULAR
COMMUNITY
Start: 2019-06-21 | End: 2019-09-11

## 2019-08-01 NOTE — PROGRESS NOTES
Subjective:     Patient ID: Calos Lam is a 78 y.o. male presenting for follow up for frontotemporal dementia. My last visit with the patient was on July 27, 2018. He is maintained on Exelon 13.3 mg/24 hours. He has been doing well since his last visit. Last neuropsychology testing was in October 2017 and  did reveal decline in areas of attention, executive memory, verbal memory, visual memory, fluency, and language.     History of Present Illness  The following portions of the patient's history were reviewed and updated as appropriate: allergies, current medications, past family history, past medical history, past social history, past surgical history and problem list.    Review of Systems   Constitutional: Negative.    HENT: Negative.    Eyes: Negative.    Respiratory: Negative.    Cardiovascular: Negative.    Gastrointestinal: Negative.    Endocrine: Negative.    Genitourinary: Negative.    Musculoskeletal: Negative.    Neurological: Negative for dizziness, tremors, seizures, syncope, facial asymmetry, speech difficulty, weakness, light-headedness, numbness and headaches.   Hematological: Does not bruise/bleed easily.   Psychiatric/Behavioral: Negative for agitation, behavioral problems, confusion, decreased concentration, dysphoric mood, hallucinations, self-injury, sleep disturbance and suicidal ideas. The patient is not nervous/anxious and is not hyperactive.         Objective:    Neurologic Exam  This patient was well-developed, well-nourished and in no acute distress.      GAIT: Gait, station, heel, toe and tandem walk were normal. There was no drift of the arms. Romberg’s sign was not present.       VASCULAR: The carotid arteries had normal upstroke to palpation without bruits. The vertebral arteries were without bruits. The radial pulses were equal and without delay. Cardiac examination revealed no murmurs with a regular rhythm. Pedal pulses were normal. The extremities were without cyanosis, clubbing  or edema.      MENTAL STATUS: This patient was alert and oriented to person, place, time and situation. Recent and remote memory - intact. Attention span, fund of knowledge and concentration were normal. He had difficulty repeating simple sentences, mixes up several words. More in depth memory testing-reviewed Dr. Flaherty's report.     CRANIAL NERVES: Olfaction-not tested. Visual fields full in all quadrants to confrontation. The pupils were equally round and reactive to light at 3-4 mm. There was no evidence of a Sanchez Essence pupil. Funduscopic exam revealed no papilledema, hemorrhage or exudate. The gaze was conjugate. The vertical and horizontal eye movements were full without nystagmus. There was no facial weakness. There was no facial sensory loss to pinprick bilaterally. Hearing was normal for light finger rub and casual speech. Speech is normal with trace  dysarthria. The neck is supple and strength is normal in rotation, flexion and extension. Tongue and palate movements are normal. He has had eyelid surgery for ptosis recently      MOTOR UPPER EXTREMTIES: Bilaterally the deltoid, biceps, triceps, wrist extensors, intrinsic hand muscles, and  were grade 5 and normal. Bulk, tone and strength of these muscles were normal without abnormal movements.      MOTOR LOWER EXTREMITIES: Bilaterally the hip flexors, hip abductors, knee flexors and extensors, ankle plantar flexors and dorsiflexors were grade 5 with normal. Bulk, tone and strength of these muscles were normal without abnormal movements.  DEEP TENDON REFLEXES: Bilateral biceps, triceps, and brachioradialis reflexes were grade 1 symmetric. Bilateral knee, hamstrings and ankle reflexes were grade 1 and symmetric. The plantar responses were downgoing. Ankle clonus was not present.      CEREBELLAR: Finger to nose, rapid finger opposition, and heel-to-shin tests were performed normally, bilaterally.      MUSCULOSKELETAL: Normal range of motion of the neck is  noted. There was no back pain with straight leg raise. Internal and external rotation of the hips was normal.       SENSORY: There was normal upper and lower extremity sensation to vibration, proprioception, and pinprick.      HIGHER CORTICAL FUNCTION: There were no aphasic or apraxic errors. Visual fields were intact to confrontation.     Physical Exam    Assessment/Plan:     Calos was seen today for dementia.    Diagnoses and all orders for this visit:    Frontotemporal dementia    Other orders  -     rivastigmine (EXELON) 13.3 MG/24HR patch; Place 1 patch on the skin as directed by provider Daily.         The patient has been doing well since his last visit.  He is to continue Exelon patch 13.3 mg per 24 hours.  We spent quite a bit of time discussing lifestyle modifications for best outcomes, including controlling cardiovascular risk factors, regular physical activity, and staying cognitively active.  They will follow-up in 1 year, sooner if needed area    >50% of this 60 minute appointment was spent discussing and educating the patient on the importance of staying cognitively, physically, and socially active for best outcomes for dementia.

## 2019-08-13 RX ORDER — AMLODIPINE BESYLATE 10 MG/1
TABLET ORAL
Qty: 30 TABLET | Refills: 1 | Status: SHIPPED | OUTPATIENT
Start: 2019-08-13 | End: 2019-09-13 | Stop reason: SDUPTHER

## 2019-09-06 ENCOUNTER — TELEPHONE (OUTPATIENT)
Dept: INTERNAL MEDICINE | Facility: CLINIC | Age: 79
End: 2019-09-06

## 2019-09-06 DIAGNOSIS — E78.5 HYPERLIPIDEMIA, UNSPECIFIED HYPERLIPIDEMIA TYPE: Primary | ICD-10-CM

## 2019-09-06 DIAGNOSIS — E11.8 CONTROLLED DIABETES MELLITUS TYPE 2 WITH COMPLICATIONS, UNSPECIFIED WHETHER LONG TERM INSULIN USE (HCC): ICD-10-CM

## 2019-09-06 LAB
ALBUMIN SERPL-MCNC: 4.3 G/DL (ref 3.5–5.2)
ALBUMIN/GLOB SERPL: 2 G/DL
ALP SERPL-CCNC: 48 U/L (ref 39–117)
ALT SERPL-CCNC: 14 U/L (ref 1–41)
AST SERPL-CCNC: 16 U/L (ref 1–40)
BILIRUB SERPL-MCNC: 0.6 MG/DL (ref 0.2–1.2)
BUN SERPL-MCNC: 16 MG/DL (ref 8–23)
BUN/CREAT SERPL: 18 (ref 7–25)
CALCIUM SERPL-MCNC: 9 MG/DL (ref 8.6–10.5)
CHLORIDE SERPL-SCNC: 101 MMOL/L (ref 98–107)
CHOLEST SERPL-MCNC: 151 MG/DL (ref 0–200)
CO2 SERPL-SCNC: 30.3 MMOL/L (ref 22–29)
CREAT SERPL-MCNC: 0.89 MG/DL (ref 0.76–1.27)
GLOBULIN SER CALC-MCNC: 2.2 GM/DL
GLUCOSE SERPL-MCNC: 131 MG/DL (ref 65–99)
HBA1C MFR BLD: 6.2 % (ref 4.8–5.6)
HDLC SERPL-MCNC: 52 MG/DL (ref 40–60)
LDLC SERPL CALC-MCNC: 87 MG/DL (ref 0–100)
POTASSIUM SERPL-SCNC: 3.9 MMOL/L (ref 3.5–5.2)
PROT SERPL-MCNC: 6.5 G/DL (ref 6–8.5)
SODIUM SERPL-SCNC: 142 MMOL/L (ref 136–145)
TRIGL SERPL-MCNC: 60 MG/DL (ref 0–150)
VLDLC SERPL CALC-MCNC: 12 MG/DL

## 2019-09-06 NOTE — TELEPHONE ENCOUNTER
Patient stopped by at check out and asked if  could add a thyroid and b12 to his lab orders that he had done today. NABILA Kumar advised patient OK to add and then add.  SLW

## 2019-09-07 LAB
Lab: NORMAL
TSH SERPL DL<=0.005 MIU/L-ACNC: 1.14 UIU/ML (ref 0.27–4.2)
VIT B12 SERPL-MCNC: 286 PG/ML (ref 211–946)
WRITTEN AUTHORIZATION: NORMAL

## 2019-09-11 ENCOUNTER — OFFICE VISIT (OUTPATIENT)
Dept: INTERNAL MEDICINE | Facility: CLINIC | Age: 79
End: 2019-09-11

## 2019-09-11 VITALS
BODY MASS INDEX: 23.62 KG/M2 | WEIGHT: 165 LBS | DIASTOLIC BLOOD PRESSURE: 72 MMHG | HEART RATE: 58 BPM | SYSTOLIC BLOOD PRESSURE: 138 MMHG | OXYGEN SATURATION: 97 % | HEIGHT: 70 IN

## 2019-09-11 DIAGNOSIS — Z23 ENCOUNTER FOR IMMUNIZATION: ICD-10-CM

## 2019-09-11 DIAGNOSIS — E78.2 MIXED HYPERLIPIDEMIA: ICD-10-CM

## 2019-09-11 DIAGNOSIS — E11.9 TYPE 2 DIABETES MELLITUS WITHOUT COMPLICATION, UNSPECIFIED WHETHER LONG TERM INSULIN USE (HCC): Primary | ICD-10-CM

## 2019-09-11 DIAGNOSIS — I10 ESSENTIAL HYPERTENSION: ICD-10-CM

## 2019-09-11 DIAGNOSIS — E53.8 B12 DEFICIENCY: ICD-10-CM

## 2019-09-11 PROCEDURE — 99214 OFFICE O/P EST MOD 30 MIN: CPT | Performed by: INTERNAL MEDICINE

## 2019-09-11 PROCEDURE — G0008 ADMIN INFLUENZA VIRUS VAC: HCPCS | Performed by: INTERNAL MEDICINE

## 2019-09-11 PROCEDURE — 90653 IIV ADJUVANT VACCINE IM: CPT | Performed by: INTERNAL MEDICINE

## 2019-09-11 RX ORDER — ERGOCALCIFEROL 1.25 MG/1
CAPSULE ORAL
Qty: 12 CAPSULE | Refills: 1 | Status: SHIPPED | OUTPATIENT
Start: 2019-09-11 | End: 2020-02-27

## 2019-09-12 NOTE — PROGRESS NOTES
Subjective     Calos Lam is a 78 y.o. male who presents with   Chief Complaint   Patient presents with   • Diabetes   • Hypertension   • Hyperlipidemia       History of Present Illness     Dm-2.  Good BS control.     HTN.  Control is excellent.   HLD.  He is maintained on pravastatin with good control.  B12 is a little low on labs.      Review of Systems   Respiratory: Negative.    Cardiovascular: Negative.        The following portions of the patient's history were reviewed and updated as appropriate: allergies, current medications and problem list.    Patient Active Problem List    Diagnosis Date Noted   • B12 deficiency 09/11/2019   • Vitamin D deficiency 03/07/2019   • Incomplete RBBB 03/07/2019   • PLMD (periodic limb movement disorder) 02/10/2018   • Colon polyp 07/25/2017     Note Last Updated: 8/31/2017     Overview:   Added automatically from request for surgery [id number]     • History of bladder cancer 02/18/2017   • RLS (restless legs syndrome) 02/18/2017   • Type 2 diabetes mellitus without complication (CMS/[facility]) 02/15/2017   • Essential hypertension    • Mixed hyperlipidemia    • Frontotemporal dementia 01/18/2017   • FIORELLA (obstructive sleep apnea) 12/11/2016     Note Last Updated: 9/11/2019     Uses a mouth piece     • Sleep-related hypoventilation/hypoxia  12/11/2016       Current Outpatient Medications on File Prior to Visit   Medication Sig Dispense Refill   • aliskiren (TEKTURNA) 300 MG tablet Tekturna 300 mg tablet   take 1 tablet by mouth once daily     • amLODIPine (NORVASC) 10 MG tablet TAKE ONE TABLET BY MOUTH ONE TIME DAILY  30 tablet 1   • ASPIRIN 81 PO Take  by mouth Daily.     • BYSTOLIC 10 MG tablet TAKE ONE TABLET BY MOUTH ONE TIME DAILY  90 tablet 1   •  MG tablet      • losartan-hydrochlorothiazide (HYZAAR) 50-12.5 MG per tablet TAKE ONE TABLET BY MOUTH ONE TIME DAILY  90 tablet 2   • omeprazole (PRILOSEC) 20 MG capsule Prilosec 20 mg capsule,delayed release   Take 1 capsule  "every day by oral route.     • pramipexole (MIRAPEX) 0.25 MG tablet Take 2 tablets by mouth Every Night. 60 tablet 3   • pravastatin (PRAVACHOL) 40 MG tablet TAKE ONE TABLET BY MOUTH ONE TIME DAILY  90 tablet 1   • rivastigmine (EXELON) 13.3 MG/24HR patch Place 1 patch on the skin as directed by provider Daily. 90 patch 3   • solifenacin (VESICARE) 10 MG tablet Vesicare 10 mg tablet   Take 1 tablet every day by oral route.     • tadalafil (CIALIS) 20 MG tablet Cialis 20 mg tablet   Take 1 tablet every day by oral route as needed.     • tamsulosin (FLOMAX) 0.4 MG capsule 24 hr capsule tamsulosin 0.4 mg capsule       No current facility-administered medications on file prior to visit.        Objective     /72   Pulse 58   Ht 177.8 cm (70\")   Wt 74.8 kg (165 lb)   SpO2 97%   BMI 23.68 kg/m²     Physical Exam   Constitutional: He is oriented to person, place, and time. He appears well-developed and well-nourished.   HENT:   Head: Atraumatic.   Cardiovascular: Normal rate, regular rhythm and normal heart sounds.   Pulmonary/Chest: Effort normal and breath sounds normal.   Neurological: He is alert and oriented to person, place, and time.   Skin: Skin is warm and dry.   Psychiatric: He has a normal mood and affect.       Assessment/Plan   Calos was seen today for diabetes, hypertension and hyperlipidemia.    Diagnoses and all orders for this visit:    Type 2 diabetes mellitus without complication, unspecified whether long term insulin use (CMS/[facility])    Essential hypertension    Mixed hyperlipidemia    B12 deficiency  -     CBC & Differential  -     Intrinsic Factor Ab  -     Methylmalonic Acid, Serum    Encounter for immunization    Other orders  -     Fluzone High Dose =>65Years        Discussion    DM-2. Continue healthy diet.    HTN. Continue current regimen.   HLD.  Continue pravastatin.    B12 borderline low. Check additional labs to determine po replacement versus injections.        Future Appointments "   Date Time Provider Department [place]   12/5/2019 11:45 AM Julian Dahl MD [facility] None   3/18/2020  9:10 AM [facility] None   3/25/2020  3:30 PM Karlie Hart MD [facility] None   8/4/2020 11:30 AM Presley Kyle APRN [facility] None

## 2019-09-13 RX ORDER — AMLODIPINE BESYLATE 10 MG/1
TABLET ORAL
Qty: 60 TABLET | Refills: 0 | Status: SHIPPED | OUTPATIENT
Start: 2019-09-13 | End: 2019-11-24 | Stop reason: SDUPTHER

## 2019-09-14 LAB
BASOPHILS # BLD AUTO: 0.03 10*3/MM3 (ref 0–0.2)
BASOPHILS NFR BLD AUTO: 0.4 % (ref 0–1.5)
EOSINOPHIL # BLD AUTO: 0.17 10*3/MM3 (ref 0–0.4)
EOSINOPHIL NFR BLD AUTO: 2.3 % (ref 0.3–6.2)
ERYTHROCYTE [DISTWIDTH] IN BLOOD BY AUTOMATED COUNT: 13.5 % (ref 12.3–15.4)
HCT VFR BLD AUTO: 48.1 % (ref 37.5–51)
HGB BLD-MCNC: 15.6 G/DL (ref 13–17.7)
IF BLOCK AB SER-ACNC: 1 AU/ML (ref 0–1.1)
IMM GRANULOCYTES # BLD AUTO: 0.02 10*3/MM3 (ref 0–0.05)
IMM GRANULOCYTES NFR BLD AUTO: 0.3 % (ref 0–0.5)
LYMPHOCYTES # BLD AUTO: 1.49 10*3/MM3 (ref 0.7–3.1)
LYMPHOCYTES NFR BLD AUTO: 20 % (ref 19.6–45.3)
Lab: ABNORMAL
MCH RBC QN AUTO: 30.8 PG (ref 26.6–33)
MCHC RBC AUTO-ENTMCNC: 32.4 G/DL (ref 31.5–35.7)
MCV RBC AUTO: 94.9 FL (ref 79–97)
METHYLMALONATE SERPL-SCNC: 556 NMOL/L (ref 0–378)
MONOCYTES # BLD AUTO: 0.55 10*3/MM3 (ref 0.1–0.9)
MONOCYTES NFR BLD AUTO: 7.4 % (ref 5–12)
NEUTROPHILS # BLD AUTO: 5.19 10*3/MM3 (ref 1.7–7)
NEUTROPHILS NFR BLD AUTO: 69.6 % (ref 42.7–76)
NRBC BLD AUTO-RTO: 0 /100 WBC (ref 0–0.2)
PLATELET # BLD AUTO: 248 10*3/MM3 (ref 140–450)
RBC # BLD AUTO: 5.07 10*6/MM3 (ref 4.14–5.8)
WBC # BLD AUTO: 7.45 10*3/MM3 (ref 3.4–10.8)

## 2019-11-24 DIAGNOSIS — I10 ESSENTIAL HYPERTENSION: Primary | ICD-10-CM

## 2019-11-25 RX ORDER — PRAMIPEXOLE DIHYDROCHLORIDE 0.25 MG/1
0.5 TABLET ORAL NIGHTLY
Qty: 60 TABLET | Refills: 3 | Status: SHIPPED | OUTPATIENT
Start: 2019-11-25

## 2019-11-25 RX ORDER — AMLODIPINE BESYLATE 10 MG/1
TABLET ORAL
Qty: 60 TABLET | Refills: 2 | Status: SHIPPED | OUTPATIENT
Start: 2019-11-25 | End: 2020-05-20 | Stop reason: SDUPTHER

## 2019-12-05 ENCOUNTER — APPOINTMENT (OUTPATIENT)
Dept: SLEEP MEDICINE | Facility: HOSPITAL | Age: 79
End: 2019-12-05
Attending: INTERNAL MEDICINE

## 2019-12-10 ENCOUNTER — OFFICE VISIT (OUTPATIENT)
Dept: SLEEP MEDICINE | Facility: HOSPITAL | Age: 79
End: 2019-12-10
Attending: INTERNAL MEDICINE

## 2019-12-10 VITALS — WEIGHT: 164 LBS | BODY MASS INDEX: 23.48 KG/M2 | HEIGHT: 70 IN

## 2019-12-10 DIAGNOSIS — G47.33 OSA (OBSTRUCTIVE SLEEP APNEA): Primary | ICD-10-CM

## 2019-12-10 DIAGNOSIS — IMO0002 SLEEP-RELATED HYPOVENTILATION: ICD-10-CM

## 2019-12-10 PROCEDURE — G0463 HOSPITAL OUTPT CLINIC VISIT: HCPCS

## 2019-12-10 PROCEDURE — 99213 OFFICE O/P EST LOW 20 MIN: CPT | Performed by: INTERNAL MEDICINE

## 2019-12-10 NOTE — PROGRESS NOTES
"Follow Up Sleep Disorders Center Note     Chief Complaint:  FIORELLA     Primary Care Physician: Karlie Hart MD    Interval History:   I last saw the patient 1 year ago.  He states he is improved.  He has adjusted his dental appliance to plastics.  Additionally, he got a new mattress.  He goes to bed at midnight and awakens between 6 and 7 AM.  He will use the bathroom during the nighttime.  [place] Sleepiness Scale is normal at 2    Review of Systems:    A complete review of systems was done and all were negative with the exception of the above    Social History:    Social History     Socioeconomic History   • Marital status:      Spouse name: Not on file   • Number of children: 1   • Years of education: Not on file   • Highest education level: Not on file   Occupational History   • Occupation: Healthcare Executive   Tobacco Use   • Smoking status: Former Smoker     Types: Cigarettes     Last attempt to quit: 1973     Years since quittin.9   • Smokeless tobacco: Never Used   Substance and Sexual Activity   • Alcohol use: No   • Drug use: No       Allergies:  Beta adrenergic blockers and Sulfa antibiotics     Medication Review:  Reviewed.      Vital Signs:    Vitals:    12/10/19 1432   Weight: 74.4 kg (164 lb)   Height: 177.8 cm (70\")     Body mass index is 23.53 kg/m².    Physical Exam:    Constitutional:  Well developed 78 y.o. male that appears in no apparent distress.  Awake & oriented times 3.  Normal mood with normal recent and remote memory and normal judgement.  Eyes:  Conjunctivae normal.  Oropharynx:  moist mucous membranes without exudate and a normal sized tongue and uvula and moderate-severe narrowing of the posterior pharyngeal opening.  He does have an overbite.       Impression:   Obstructive sleep apnea by overnight polysomnogram 2018.  Mild obstructive sleep apnea for total sleep time; mainly moderate severity when supine and during REM.  On his left side he is normal. "   There was also evidence for periodic limb movement disorder.  Treated with Mirapex   Home sleep study July 18, 2018, weight 170 pounds, mild obstructive sleep apnea with AHI 13 events per hour. Sleep-related hypoxemia with low O2 saturation 78% for 22 minutes.  Total time snoring 76%  Patient presently treated with an oral appliance with improvement in symptoms    Plan:  Good sleep hygiene measures should be maintained.  The patient is benefiting from the treatment being provided.     After reviewing all with the patient, it is recommended to proceed with a repeat home sleep study with readjusted oral appliance in place    The patient will call for any problems and will follow up after repeat home sleep study with oral appliance in place performed      Julian Dahl MD  Sleep Medicine  12/10/19  2:37 PM

## 2020-01-08 ENCOUNTER — HOSPITAL ENCOUNTER (OUTPATIENT)
Dept: SLEEP MEDICINE | Facility: HOSPITAL | Age: 80
Discharge: HOME | End: 2020-01-08
Admitting: INTERNAL MEDICINE

## 2020-01-08 DIAGNOSIS — G47.33 OSA (OBSTRUCTIVE SLEEP APNEA): ICD-10-CM

## 2020-01-08 PROCEDURE — 95806 SLEEP STUDY UNATT&RESP EFFT: CPT | Performed by: INTERNAL MEDICINE

## 2020-01-08 PROCEDURE — 95806 SLEEP STUDY UNATT&RESP EFFT: CPT

## 2020-01-21 RX ORDER — PRAVASTATIN SODIUM 40 MG
TABLET ORAL
Qty: 90 TABLET | Refills: 0 | Status: SHIPPED | OUTPATIENT
Start: 2020-01-21 | End: 2020-04-20

## 2020-01-22 ENCOUNTER — TELEPHONE (OUTPATIENT)
Dept: NEUROLOGY | Facility: HOSPITAL | Age: 80
End: 2020-01-22

## 2020-01-29 ENCOUNTER — OFFICE VISIT (OUTPATIENT)
Dept: SLEEP MEDICINE | Facility: HOSPITAL | Age: 80
End: 2020-01-29

## 2020-01-29 VITALS — WEIGHT: 161 LBS | BODY MASS INDEX: 23.05 KG/M2 | HEIGHT: 70 IN

## 2020-01-29 DIAGNOSIS — G47.33 OSA (OBSTRUCTIVE SLEEP APNEA): Primary | ICD-10-CM

## 2020-01-29 DIAGNOSIS — G47.61 PLMD (PERIODIC LIMB MOVEMENT DISORDER): ICD-10-CM

## 2020-01-29 DIAGNOSIS — IMO0002 SLEEP-RELATED HYPOVENTILATION: ICD-10-CM

## 2020-01-29 DIAGNOSIS — G25.81 RLS (RESTLESS LEGS SYNDROME): ICD-10-CM

## 2020-01-29 PROCEDURE — 99213 OFFICE O/P EST LOW 20 MIN: CPT | Performed by: INTERNAL MEDICINE

## 2020-01-29 PROCEDURE — G0463 HOSPITAL OUTPT CLINIC VISIT: HCPCS

## 2020-01-29 NOTE — PROGRESS NOTES
"Follow Up Sleep Disorders Center Note     Chief Complaint:  FIORELLA     Primary Care Physician: Karlie Hart MD    Interval History:   The patient had a home sleep study 2020.  The patient uses an oral appliance set at 4.5.  He is unchanged.  He has no specific complaints.  He will use the bathroom once during the nighttime.  [place] Sleepiness Scale is normal at 2    Review of Systems:    A complete review of systems was done and all were negative with the exception of the above    Social History:    Social History     Socioeconomic History   • Marital status:      Spouse name: Not on file   • Number of children: 1   • Years of education: Not on file   • Highest education level: Not on file   Occupational History   • Occupation: Healthcare Executive   Tobacco Use   • Smoking status: Former Smoker     Types: Cigarettes     Last attempt to quit: 1973     Years since quittin.1   • Smokeless tobacco: Never Used   Substance and Sexual Activity   • Alcohol use: No   • Drug use: No       Allergies:  Beta adrenergic blockers and Sulfa antibiotics     Medication Review:  Reviewed.      Vital Signs:    Vitals:    20 1023   Weight: 73 kg (161 lb)   Height: 177.8 cm (70\")     Body mass index is 23.1 kg/m².    Physical Exam:    Constitutional:  Well developed 79 y.o. male that appears in no apparent distress.  Awake & oriented times 3.  Normal mood with normal recent and remote memory and normal judgement.  Eyes:  Conjunctivae normal.  Oropharynx:  moist mucous membranes without exudate and a normal sized tongue and uvula and moderate-severe narrowing of the posterior pharyngeal opening and he does have an overbite     Results Review: I reviewed his home sleep study from 2020.  AHI normal in the side sleeping position.  When supine for 35 minutes, AHI extrapolated to 36 events per hour.  No sleep-related hypoxia.  Snoring improved at 43%.     Impression:   Obstructive sleep apnea by overnight " polysomnogram January 20, 2018.  Mild obstructive sleep apnea for total sleep time; mainly moderate severity when supine and during REM.  On his left side he is normal.   There was also evidence for periodic limb movement disorder om NPSG. The patient has symptoms of RLS. Treated with Mirapex   Home sleep study July 18, 2018, weight 170 pounds, mild obstructive sleep apnea with AHI 13 events per hour. Sleep-related hypoxemia with low O2 saturation 78% for 22 minutes.  Total time snoring 76%  Patient presently treated with an oral appliance with improvement in symptoms.  Home sleep study 1/8/2020 with AHI normal when on his right and left sides.  When supine for 35 minutes, AHI extrapolated to 36 events per hour.  No sleep-related hypoxia present.  The patient snored 43% of his total sleep time.    Plan:  Good sleep hygiene measures should be maintained.  The patient is benefiting from the treatment being provided.     I reviewed all with the patient.  If possible, the patient will advance his oral appliance to 5.5.  If he cannot tolerate it he will return to 4.5.  The patient will maintain a side sleeping position.  The patient is improved.    The patient will continue meds as prescribed.    The patient will call for any problems and will follow up as needed       Julian Dahl MD  Sleep Medicine  01/29/20  10:24 AM

## 2020-02-06 RX ORDER — NEBIVOLOL HYDROCHLORIDE 10 MG/1
TABLET ORAL
Qty: 90 TABLET | Refills: 0 | Status: SHIPPED | OUTPATIENT
Start: 2020-02-06 | End: 2020-05-20 | Stop reason: SDUPTHER

## 2020-02-27 RX ORDER — ERGOCALCIFEROL 1.25 MG/1
CAPSULE ORAL
Qty: 12 CAPSULE | Refills: 0 | Status: SHIPPED | OUTPATIENT
Start: 2020-02-27 | End: 2020-05-20 | Stop reason: SDUPTHER

## 2020-03-03 ENCOUNTER — TELEPHONE (OUTPATIENT)
Dept: INTERNAL MEDICINE | Facility: CLINIC | Age: 80
End: 2020-03-03

## 2020-03-18 DIAGNOSIS — E53.8 B12 DEFICIENCY: ICD-10-CM

## 2020-03-18 DIAGNOSIS — E11.8 CONTROLLED DIABETES MELLITUS TYPE 2 WITH COMPLICATIONS, UNSPECIFIED WHETHER LONG TERM INSULIN USE (HCC): Primary | ICD-10-CM

## 2020-03-18 DIAGNOSIS — E55.9 VITAMIN D DEFICIENCY: ICD-10-CM

## 2020-03-18 DIAGNOSIS — E78.5 HYPERLIPIDEMIA, UNSPECIFIED HYPERLIPIDEMIA TYPE: ICD-10-CM

## 2020-03-19 LAB
25(OH)D3+25(OH)D2 SERPL-MCNC: 55.1 NG/ML (ref 30–100)
ALBUMIN SERPL-MCNC: 4.1 G/DL (ref 3.5–5.2)
ALBUMIN/CREAT UR: 15 MG/G CREAT (ref 0–29)
ALBUMIN/GLOB SERPL: 1.6 G/DL
ALP SERPL-CCNC: 55 U/L (ref 39–117)
ALT SERPL-CCNC: 18 U/L (ref 1–41)
APPEARANCE UR: CLEAR
AST SERPL-CCNC: 16 U/L (ref 1–40)
BACTERIA #/AREA URNS HPF: NORMAL /HPF
BASOPHILS # BLD AUTO: 0.04 10*3/MM3 (ref 0–0.2)
BASOPHILS NFR BLD AUTO: 0.6 % (ref 0–1.5)
BILIRUB SERPL-MCNC: 0.6 MG/DL (ref 0.2–1.2)
BILIRUB UR QL STRIP: NEGATIVE
BUN SERPL-MCNC: 19 MG/DL (ref 8–23)
BUN/CREAT SERPL: 19.6 (ref 7–25)
CALCIUM SERPL-MCNC: 9.3 MG/DL (ref 8.6–10.5)
CASTS URNS MICRO: NORMAL
CHLORIDE SERPL-SCNC: 98 MMOL/L (ref 98–107)
CHOLEST SERPL-MCNC: 173 MG/DL (ref 0–200)
CO2 SERPL-SCNC: 30.6 MMOL/L (ref 22–29)
COLOR UR: YELLOW
CREAT SERPL-MCNC: 0.97 MG/DL (ref 0.76–1.27)
CREAT UR-MCNC: 65.4 MG/DL
EOSINOPHIL # BLD AUTO: 0.24 10*3/MM3 (ref 0–0.4)
EOSINOPHIL NFR BLD AUTO: 3.5 % (ref 0.3–6.2)
EPI CELLS #/AREA URNS HPF: NORMAL /HPF
ERYTHROCYTE [DISTWIDTH] IN BLOOD BY AUTOMATED COUNT: 12.9 % (ref 12.3–15.4)
GLOBULIN SER CALC-MCNC: 2.6 GM/DL
GLUCOSE SERPL-MCNC: 121 MG/DL (ref 65–99)
GLUCOSE UR QL: NEGATIVE
HBA1C MFR BLD: 6.4 % (ref 4.8–5.6)
HCT VFR BLD AUTO: 48.3 % (ref 37.5–51)
HDLC SERPL-MCNC: 52 MG/DL (ref 40–60)
HGB BLD-MCNC: 16.3 G/DL (ref 13–17.7)
HGB UR QL STRIP: NEGATIVE
IMM GRANULOCYTES # BLD AUTO: 0.01 10*3/MM3 (ref 0–0.05)
IMM GRANULOCYTES NFR BLD AUTO: 0.1 % (ref 0–0.5)
KETONES UR QL STRIP: NEGATIVE
LDLC SERPL CALC-MCNC: 101 MG/DL (ref 0–100)
LEUKOCYTE ESTERASE UR QL STRIP: NEGATIVE
LYMPHOCYTES # BLD AUTO: 1.6 10*3/MM3 (ref 0.7–3.1)
LYMPHOCYTES NFR BLD AUTO: 23.4 % (ref 19.6–45.3)
MCH RBC QN AUTO: 30.8 PG (ref 26.6–33)
MCHC RBC AUTO-ENTMCNC: 33.7 G/DL (ref 31.5–35.7)
MCV RBC AUTO: 91.3 FL (ref 79–97)
MICROALBUMIN UR-MCNC: 9.8 UG/ML
MONOCYTES # BLD AUTO: 0.61 10*3/MM3 (ref 0.1–0.9)
MONOCYTES NFR BLD AUTO: 8.9 % (ref 5–12)
NEUTROPHILS # BLD AUTO: 4.35 10*3/MM3 (ref 1.7–7)
NEUTROPHILS NFR BLD AUTO: 63.5 % (ref 42.7–76)
NITRITE UR QL STRIP: NEGATIVE
NRBC BLD AUTO-RTO: 0 /100 WBC (ref 0–0.2)
PH UR STRIP: 6.5 [PH] (ref 5–8)
PLATELET # BLD AUTO: 233 10*3/MM3 (ref 140–450)
POTASSIUM SERPL-SCNC: 4 MMOL/L (ref 3.5–5.2)
PROT SERPL-MCNC: 6.7 G/DL (ref 6–8.5)
PROT UR QL STRIP: NEGATIVE
RBC # BLD AUTO: 5.29 10*6/MM3 (ref 4.14–5.8)
RBC #/AREA URNS HPF: NORMAL /HPF
SODIUM SERPL-SCNC: 139 MMOL/L (ref 136–145)
SP GR UR: 1.01 (ref 1–1.03)
TRIGL SERPL-MCNC: 98 MG/DL (ref 0–150)
TSH SERPL DL<=0.005 MIU/L-ACNC: 1.85 UIU/ML (ref 0.27–4.2)
UROBILINOGEN UR STRIP-MCNC: NORMAL MG/DL
VIT B12 SERPL-MCNC: 914 PG/ML (ref 211–946)
VLDLC SERPL CALC-MCNC: 19.6 MG/DL
WBC # BLD AUTO: 6.85 10*3/MM3 (ref 3.4–10.8)
WBC #/AREA URNS HPF: NORMAL /HPF

## 2020-04-07 RX ORDER — LOSARTAN POTASSIUM AND HYDROCHLOROTHIAZIDE 12.5; 5 MG/1; MG/1
TABLET ORAL
Qty: 90 TABLET | Refills: 1 | Status: SHIPPED | OUTPATIENT
Start: 2020-04-07 | End: 2020-05-19 | Stop reason: SDUPTHER

## 2020-04-20 RX ORDER — PRAVASTATIN SODIUM 40 MG
TABLET ORAL
Qty: 90 TABLET | Refills: 0 | Status: SHIPPED | OUTPATIENT
Start: 2020-04-20 | End: 2020-05-19 | Stop reason: SDUPTHER

## 2020-05-18 ENCOUNTER — TELEPHONE (OUTPATIENT)
Dept: NEUROLOGY | Facility: CLINIC | Age: 80
End: 2020-05-18

## 2020-05-18 NOTE — TELEPHONE ENCOUNTER
Received a letter from the patient dated 5/8/2020 indicating he had been seen by Dr. Santiago in 2016 with diagnosis frontal lobe dementia and subsequently followed up by Presley Rolle NP.  Upon his request I reviewed the chart.    He was initially seen by Dr. Santiago in 2016 after he had had neuropsych testing which demonstrated a differential of frontotemporal degeneration versus subcortical dementia.  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  Which is at about age 68.  He had had a CT of the brain and Dr. Santiago ordered an MRI of the brain.  I reviewed the films.  There is mild diffuse atrophy and mild small vessel changes.  There may be some predilection towards the anterior temporal lobes but there is clearly no ballooning of the temporal tips.  He had normal thyroid functions and B12 level.    Subsequently he had follow-up neuropsych testing which demonstrated increasing deficits 2017 pointing more in the direction of frontotemporal degeneration.  He also more recently had follow-up B12 level that was in the high 200s and a methylmalonic acid level was elevated and so he was apparently placed on B12 with a follow-up B12 level in the 500s.  He has obstructive sleep apnea with recent follow-up by Dr. Dahl with oxygen saturations into the mid 80s.  He apparently did not previously tolerate CPAP.  He also has a diagnosis of periodic limb movements and restless leg syndrome.    It appears that he has a follow-up scheduled with Presley Rolle 9/15/2020.  Upon his request I can see him in follow-up but will likely be again turned back over to 1 of our nurse practitioners.  Depending on the circumstance he may wish to consider being followed by a neuropsychiatrist such as Dr. Welch or Dr. Schoenbachler at the [place] [facility].    S

## 2020-05-19 RX ORDER — LOSARTAN POTASSIUM AND HYDROCHLOROTHIAZIDE 12.5; 5 MG/1; MG/1
1 TABLET ORAL DAILY
Qty: 90 TABLET | Refills: 1 | Status: SHIPPED | OUTPATIENT
Start: 2020-05-19 | End: 2021-01-12 | Stop reason: SDUPTHER

## 2020-05-19 RX ORDER — TAMSULOSIN HYDROCHLORIDE 0.4 MG/1
1 CAPSULE ORAL DAILY
Qty: 90 CAPSULE | Refills: 1 | Status: SHIPPED | OUTPATIENT
Start: 2020-05-19 | End: 2021-01-12 | Stop reason: SDUPTHER

## 2020-05-19 RX ORDER — RIVASTIGMINE 13.3 MG/24H
1 PATCH, EXTENDED RELEASE TRANSDERMAL DAILY
Qty: 90 PATCH | Refills: 3 | Status: SHIPPED | OUTPATIENT
Start: 2020-05-19 | End: 2021-06-15

## 2020-05-19 RX ORDER — PRAVASTATIN SODIUM 40 MG
40 TABLET ORAL DAILY
Qty: 90 TABLET | Refills: 0 | Status: SHIPPED | OUTPATIENT
Start: 2020-05-19 | End: 2020-11-01

## 2020-05-20 DIAGNOSIS — I10 ESSENTIAL HYPERTENSION: ICD-10-CM

## 2020-05-20 RX ORDER — ERGOCALCIFEROL 1.25 MG/1
50000 CAPSULE ORAL
Qty: 12 CAPSULE | Refills: 1 | Status: SHIPPED | OUTPATIENT
Start: 2020-05-20 | End: 2020-09-21

## 2020-05-20 RX ORDER — NEBIVOLOL HYDROCHLORIDE 10 MG/1
10 TABLET ORAL DAILY
Qty: 90 TABLET | Refills: 1 | Status: SHIPPED | OUTPATIENT
Start: 2020-05-20 | End: 2020-06-04 | Stop reason: SDUPTHER

## 2020-05-20 RX ORDER — AMLODIPINE BESYLATE 10 MG/1
10 TABLET ORAL DAILY
Qty: 180 TABLET | Refills: 1 | Status: SHIPPED | OUTPATIENT
Start: 2020-05-20 | End: 2021-06-10

## 2020-06-04 RX ORDER — NEBIVOLOL HYDROCHLORIDE 10 MG/1
10 TABLET ORAL DAILY
Qty: 90 TABLET | Refills: 0 | Status: SHIPPED | OUTPATIENT
Start: 2020-06-04 | End: 2021-03-05

## 2020-06-09 ENCOUNTER — OFFICE VISIT (OUTPATIENT)
Dept: INTERNAL MEDICINE | Facility: CLINIC | Age: 80
End: 2020-06-09

## 2020-06-09 VITALS
HEART RATE: 53 BPM | DIASTOLIC BLOOD PRESSURE: 60 MMHG | WEIGHT: 163 LBS | HEIGHT: 70 IN | SYSTOLIC BLOOD PRESSURE: 138 MMHG | TEMPERATURE: 97.7 F | BODY MASS INDEX: 23.34 KG/M2 | RESPIRATION RATE: 16 BRPM

## 2020-06-09 DIAGNOSIS — E11.9 TYPE 2 DIABETES MELLITUS WITHOUT COMPLICATION, UNSPECIFIED WHETHER LONG TERM INSULIN USE (HCC): ICD-10-CM

## 2020-06-09 DIAGNOSIS — E78.2 MIXED HYPERLIPIDEMIA: ICD-10-CM

## 2020-06-09 DIAGNOSIS — Z00.00 MEDICARE ANNUAL WELLNESS VISIT, SUBSEQUENT: Primary | ICD-10-CM

## 2020-06-09 DIAGNOSIS — Z00.00 WELL ADULT EXAM: ICD-10-CM

## 2020-06-09 DIAGNOSIS — I10 ESSENTIAL HYPERTENSION: ICD-10-CM

## 2020-06-09 PROCEDURE — 96160 PT-FOCUSED HLTH RISK ASSMT: CPT | Performed by: INTERNAL MEDICINE

## 2020-06-09 PROCEDURE — 99397 PER PM REEVAL EST PAT 65+ YR: CPT | Performed by: INTERNAL MEDICINE

## 2020-06-09 PROCEDURE — G0439 PPPS, SUBSEQ VISIT: HCPCS | Performed by: INTERNAL MEDICINE

## 2020-06-09 NOTE — PROGRESS NOTES
Subjective     Calos Lam is a 79 y.o. male who presents for an annual wellness visit/cpe.        History of Present Illness     Dm-2.  Good BS control.     HTN.  Control is excellent.   HLD.  He is maintained on pravastatin with good control.    Review of Systems   Constitutional: Negative.    HENT: Negative.    Eyes: Negative.    Respiratory: Negative.    Cardiovascular: Negative.    Endocrine: Negative.    Genitourinary: Negative.    Musculoskeletal: Negative.    Skin: Negative.    Allergic/Immunologic: Negative.    Neurological: Negative.    Hematological: Negative.    Psychiatric/Behavioral: Negative.        The following portions of the patient's history were reviewed and updated as appropriate: allergies, current medications, past family history, past medical history, past social history, past surgical history and problem list.  Health maintenance tab was reviewed and updated with the patient.       Patient Active Problem List    Diagnosis Date Noted   • B12 deficiency 09/11/2019     Note Last Updated: 9/16/2019     IF is normal.  Recommend oral supplement.       • Vitamin D deficiency 03/07/2019   • Incomplete RBBB 03/07/2019   • PLMD (periodic limb movement disorder) 02/10/2018   • Colon polyp 07/25/2017     Note Last Updated: 8/31/2017     Overview:   Added automatically from request for surgery [id number]     • History of bladder cancer 02/18/2017   • RLS (restless legs syndrome) 02/18/2017   • Type 2 diabetes mellitus without complication (CMS/[facility]) 02/15/2017   • Essential hypertension    • Mixed hyperlipidemia    • Frontotemporal dementia (CMS/[facility]) 01/18/2017   • FIORELLA (obstructive sleep apnea) 12/11/2016     Note Last Updated: 9/11/2019     Uses a mouth piece     • Sleep-related hypoventilation/hypoxia  12/11/2016       Past Medical History:   Diagnosis Date   • Essential hypertension    • GERD (gastroesophageal reflux disease)    • Impacted cerumen    • Impotence of organic origin    • Malaise and  fatigue    • Mantoux: positive    • Memory loss    • Mixed hyperlipidemia    • FIORELLA (obstructive sleep apnea)     Treated with an oral appliance   • Pain in thoracic spine    • Primary malignant neoplasm of bladder (CMS/HCC) 2009   • Restless leg syndrome, uncontrolled    • Testicular hypofunction    • Vertigo    • Vitamin D deficiency        Past Surgical History:   Procedure Laterality Date   • APPENDECTOMY     • COLON RESECTION LEFT Left    • COLONOSCOPY  2012    normal   • EYE SURGERY      lasik. Dr. Storey   • LASIK Bilateral    • ROOT CANAL     • TURP / TRANSURETHRAL INCISION / DRAINAGE PROSTATE         Family History   Problem Relation Age of Onset   • Diabetes Sister    • COPD Sister    • Diabetes Brother    • Heart disease Brother    • Hypertension Brother    • Dementia Maternal Grandmother    • Alcohol abuse Neg Hx        Social History     Socioeconomic History   • Marital status:      Spouse name: Not on file   • Number of children: 1   • Years of education: Not on file   • Highest education level: Not on file   Occupational History   • Occupation: Healthcare Executive   Tobacco Use   • Smoking status: Former Smoker     Types: Cigarettes     Last attempt to quit: 1973     Years since quittin.4   • Smokeless tobacco: Never Used   Substance and Sexual Activity   • Alcohol use: No   • Drug use: No       Current Outpatient Medications on File Prior to Visit   Medication Sig Dispense Refill   • aliskiren (TEKTURNA) 300 MG tablet Tekturna 300 mg tablet   take 1 tablet by mouth once daily     • amLODIPine (NORVASC) 10 MG tablet Take 1 tablet by mouth Daily. 180 tablet 1   • ASPIRIN 81 PO Take  by mouth Daily.     • BYSTOLIC 10 MG tablet Take 1 tablet by mouth Daily. 90 tablet 0   •  MG tablet      • losartan-hydrochlorothiazide (HYZAAR) 50-12.5 MG per tablet Take 1 tablet by mouth Daily. 90 tablet 1   • omeprazole (PRILOSEC) 20 MG capsule Prilosec 20 mg  "capsule,delayed release   Take 1 capsule every day by oral route.     • pramipexole (MIRAPEX) 0.25 MG tablet Take 2 tablets by mouth Every Night. 60 tablet 3   • pravastatin (PRAVACHOL) 40 MG tablet Take 1 tablet by mouth Daily. 90 tablet 0   • rivastigmine (EXELON) 13.3 MG/24HR patch Place 1 patch on the skin as directed by provider Daily. 90 patch 3   • solifenacin (VESICARE) 10 MG tablet Vesicare 10 mg tablet   Take 1 tablet every day by oral route.     • tadalafil (CIALIS) 20 MG tablet Cialis 20 mg tablet   Take 1 tablet every day by oral route as needed.     • tamsulosin (FLOMAX) 0.4 MG capsule 24 hr capsule Take 1 capsule by mouth Daily. 90 capsule 1   • vitamin D (ERGOCALCIFEROL) 1.25 MG (71071 UT) capsule capsule Take 1 capsule by mouth Every 7 (Seven) Days. 12 capsule 1     No current facility-administered medications on file prior to visit.        Allergies   Allergen Reactions   • Beta Adrenergic Blockers    • Sulfa Antibiotics        Immunization History   Administered Date(s) Administered   • Fluzone High Dose =>65 Years ([employer] ONLY) 10/13/2017, 09/11/2019   • Hepatitis A 12/27/2018, 06/21/2019   • Influenza TIV (IM) 10/11/2014, 11/15/2015, 11/08/2016   • Pneumococcal Conjugate 13-Valent (PCV13) 02/15/2017   • Pneumococcal Polysaccharide (PPSV23) 02/28/2018   • Shingrix 06/07/2019   • Td, Not Adsorbed 05/04/2014   • Zostavax 01/01/2013, 06/07/2019       Objective     /60   Pulse 53   Temp 97.7 °F (36.5 °C) (Temporal)   Resp 16   Ht 177.8 cm (70\")   Wt 73.9 kg (163 lb)   BMI 23.39 kg/m²     Physical Exam   Constitutional: He is oriented to person, place, and time. He appears well-developed and well-nourished.   HENT:   Head: Normocephalic and atraumatic.   Right Ear: Hearing and tympanic membrane normal.   Left Ear: Hearing and tympanic membrane normal.   Mouth/Throat: No posterior oropharyngeal erythema.   Neck: Neck supple. No thyromegaly present.   Cardiovascular: Normal rate, regular " rhythm and normal heart sounds.   No murmur heard.  Pulmonary/Chest: Effort normal and breath sounds normal.   Abdominal: Soft. He exhibits no distension. There is no hepatosplenomegaly. There is no tenderness.   Lymphadenopathy:     He has no cervical adenopathy.   Neurological: He is alert and oriented to person, place, and time.   Skin: Skin is warm and dry.   Psychiatric: He has a normal mood and affect. His speech is normal and behavior is normal. Judgment and thought content normal. Cognition and memory are normal.       Assessment/Plan   Calos was seen today for medicare wellness-subsequent.    Diagnoses and all orders for this visit:    Medicare annual wellness visit, subsequent    Well adult exam    Type 2 diabetes mellitus without complication, unspecified whether long term insulin use (CMS/[facility])    Essential hypertension    Mixed hyperlipidemia        Discussion    AWV.  See below for carrol history, PHQ-9, functional ability questionnaire, cognitive impairment screening.  Direct observation of cognitive abilities:  Mild impairment of cognitive function.    These were all reviewed with the patient and the patient was provided with a personal prevention plan of service in patient instructions.  Patient was given advice or handouts on the following topics:  nutrition, exercise.     DM-2.  The patient will continue current regimen.      HTN.  The patient will continue current regimen.      HLD.  The patient will continue current regimen.          Depression Screen:    PHQ-2/PHQ-9 Depression Screening 6/9/2020   Little interest or pleasure in doing things 0   Feeling down, depressed, or hopeless 0   Trouble falling or staying asleep, or sleeping too much -   Feeling tired or having little energy -   Poor appetite or overeating -   Feeling bad about yourself - or that you are a failure or have let yourself or your family down -   Trouble concentrating on things, such as reading the newspaper or watching  television -   Moving or speaking so slowly that other people could have noticed. Or the opposite - being so fidgety or restless that you have been moving around a lot more than usual -   Thoughts that you would be better off dead, or of hurting yourself in some way -   Total Score 0   If you checked off any problems, how difficult have these problems made it for you to do your work, take care of things at home, or get along with other people? -       Fall Risk Screen:  [place] Fall Risk Assessment was completed, and patient is at LOW risk for falls.Assessment completed on:6/9/2020    Health Habits and Functional/Cognitive screen:  Functional & Cognitive Status 6/9/2020   Do you have difficulty preparing food and eating? No   Do you have difficulty bathing yourself, getting dressed or grooming yourself? No   Do you have difficulty using the toilet? No   Do you have difficulty moving around from place to place? No   Do you have trouble with steps or getting out of a bed or a chair? No   Current Diet Well Balanced Diet   Dental Exam Not up to date   Eye Exam Up to date   Exercise (times per week) 3 times per week   Current Exercise Activities Include Walking   Do you need help using the phone?  No   Are you deaf or do you have serious difficulty hearing?  No   Do you need help with transportation? No   Do you need help shopping? No   Do you need help preparing meals?  No   Do you need help with housework?  No   Do you need help with laundry? No   Do you need help taking your medications? No   Do you need help managing money? Yes   Do you ever drive or ride in a car without wearing a seat belt? No   Have you felt unusual stress, anger or loneliness in the last month? No   Who do you live with? Spouse   If you need help, do you have trouble finding someone available to you? No   Have you been bothered in the last four weeks by sexual problems? No   Do you have difficulty concentrating, remembering or making decisions?  Yes       Health Maintenance   Topic Date Due   • DIABETIC EYE EXAM  04/05/2018   • TDAP/TD VACCINES (1 - Tdap) 05/05/2024 (Originally 12/21/1951)   • INFLUENZA VACCINE  08/01/2020   • HEMOGLOBIN A1C  09/18/2020   • LIPID PANEL  03/18/2021   • URINE MICROALBUMIN  03/18/2021   • MEDICARE ANNUAL WELLNESS  06/09/2021   • COLONOSCOPY  08/08/2022   • Pneumococcal Vaccine Once at 65 Years Old  Completed   • ZOSTER VACCINE  Discontinued            Future Appointments   Date Time Provider Department Center   9/18/2020 11:00 AM Mj Lozoya MD [facility]   12/7/2020  8:30 AM [facility] None   12/15/2020  2:30 PM Karlie Hart MD [facility] None

## 2020-09-18 ENCOUNTER — FLU SHOT (OUTPATIENT)
Dept: INTERNAL MEDICINE | Facility: CLINIC | Age: 80
End: 2020-09-18

## 2020-09-18 ENCOUNTER — OFFICE VISIT (OUTPATIENT)
Dept: NEUROLOGY | Facility: CLINIC | Age: 80
End: 2020-09-18

## 2020-09-18 VITALS — HEIGHT: 70 IN | OXYGEN SATURATION: 98 % | BODY MASS INDEX: 23.34 KG/M2 | WEIGHT: 163 LBS | HEART RATE: 52 BPM

## 2020-09-18 DIAGNOSIS — G31.09 FRONTOTEMPORAL LOBAR DEGENERATION (HCC): Primary | ICD-10-CM

## 2020-09-18 DIAGNOSIS — Z23 NEED FOR VACCINATION: Primary | ICD-10-CM

## 2020-09-18 DIAGNOSIS — F02.80 FRONTOTEMPORAL LOBAR DEGENERATION (HCC): Primary | ICD-10-CM

## 2020-09-18 PROCEDURE — G0008 ADMIN INFLUENZA VIRUS VAC: HCPCS | Performed by: INTERNAL MEDICINE

## 2020-09-18 PROCEDURE — 99215 OFFICE O/P EST HI 40 MIN: CPT | Performed by: PSYCHIATRY & NEUROLOGY

## 2020-09-18 PROCEDURE — 90694 VACC AIIV4 NO PRSRV 0.5ML IM: CPT | Performed by: INTERNAL MEDICINE

## 2020-09-18 NOTE — PROGRESS NOTES
CC: Frontotemporal degeneration    HPI:  Calos Lam is a  79 y.o.  right-handed white male who I am seeing for the first time as a follow-up with frontotemporal degeneration.  He was previously seen by Dr. Santiago and subsequently followed up by Presley Rolle NP.  He has had neuropsych testing which demonstrates evidence of frontotemporal degeneration.  Head CT obtained in 2018 was reviewed.  He has only mild atrophy.  No evidence of hydrocephalus.    He is accompanied by his wife.  She is quite detail oriented regarding how the patient is doing at home.  She notes some of the behavioral changes that he has such as passing gas in public, never closing cabinet doors or drawers which emphasizes some loss of some of his social norms as well as troubles with completion of task.  He does have some degree of memory loss but it is not very severe.  He continues to drive but has some limitations regarding not driving at night and only driving in the neighborhood and not on the expressway.  He needs this reviewed for [place].    He has had about 4 episodes of incontinence over about 4 years.  His gait has not really changed.  He continues Exelon patch 13.3 mg per 24 hours.  He asks about Namenda.  He is on vitamin B12 supplementation as well as vitamin D all of which is through primary care.  His blood pressure and blood sugar have been under good control and his sleep apnea is being treated with a mouthpiece since he does not tolerate CPAP and his restless legs are treated effectively with pramipexole.  The patient exercises 3 times a week and reads incessantly.  Despite onset of symptoms probably by age 68 according to previous notes he is remarkably intact.        Past Medical History:   Diagnosis Date   • Essential hypertension    • GERD (gastroesophageal reflux disease)    • Impacted cerumen    • Impotence of organic origin    • Malaise and fatigue    • Mantoux: positive    • Memory loss    • Mixed  hyperlipidemia    • FIORELLA (obstructive sleep apnea)     Treated with an oral appliance   • Pain in thoracic spine    • Primary malignant neoplasm of bladder (CMS/HCC) 09/2009   • Restless leg syndrome, uncontrolled    • Testicular hypofunction    • Vertigo    • Vitamin D deficiency          Past Surgical History:   Procedure Laterality Date   • APPENDECTOMY  1951   • COLON RESECTION LEFT Left 2005   • COLONOSCOPY  07/31/2012    normal   • EYE SURGERY  2014    lasik. Dr. Storey   • LASIK Bilateral 2003   • ROOT CANAL     • TURP / TRANSURETHRAL INCISION / DRAINAGE PROSTATE  1980           Current Outpatient Medications:   •  aliskiren (TEKTURNA) 300 MG tablet, Tekturna 300 mg tablet  take 1 tablet by mouth once daily, Disp: , Rfl:   •  amLODIPine (NORVASC) 10 MG tablet, Take 1 tablet by mouth Daily., Disp: 180 tablet, Rfl: 1  •  ASPIRIN 81 PO, Take  by mouth Daily., Disp: , Rfl:   •  BYSTOLIC 10 MG tablet, Take 1 tablet by mouth Daily., Disp: 90 tablet, Rfl: 0  •  Cyanocobalamin (VITAMIN B-12 PO), Take  by mouth., Disp: , Rfl:   •  losartan-hydrochlorothiazide (HYZAAR) 50-12.5 MG per tablet, Take 1 tablet by mouth Daily., Disp: 90 tablet, Rfl: 1  •  omeprazole (PRILOSEC) 20 MG capsule, Prilosec 20 mg capsule,delayed release  Take 1 capsule every day by oral route., Disp: , Rfl:   •  pramipexole (MIRAPEX) 0.25 MG tablet, Take 2 tablets by mouth Every Night., Disp: 60 tablet, Rfl: 3  •  pravastatin (PRAVACHOL) 40 MG tablet, Take 1 tablet by mouth Daily., Disp: 90 tablet, Rfl: 0  •  rivastigmine (EXELON) 13.3 MG/24HR patch, Place 1 patch on the skin as directed by provider Daily., Disp: 90 patch, Rfl: 3  •  solifenacin (VESICARE) 10 MG tablet, Vesicare 10 mg tablet  Take 1 tablet every day by oral route., Disp: , Rfl:   •  vitamin D (ERGOCALCIFEROL) 1.25 MG (30088 UT) capsule capsule, Take 1 capsule by mouth Every 7 (Seven) Days., Disp: 12 capsule, Rfl: 1  •   MG tablet, , Disp: , Rfl:   •  tadalafil (CIALIS) 20 MG  tablet, Cialis 20 mg tablet  Take 1 tablet every day by oral route as needed., Disp: , Rfl:   •  tamsulosin (FLOMAX) 0.4 MG capsule 24 hr capsule, Take 1 capsule by mouth Daily., Disp: 90 capsule, Rfl: 1      Family History   Problem Relation Age of Onset   • Diabetes Sister    • COPD Sister    • Diabetes Brother    • Heart disease Brother    • Hypertension Brother    • Dementia Maternal Grandmother    • Alcohol abuse Neg Hx          Social History     Socioeconomic History   • Marital status:      Spouse name: Not on file   • Number of children: 1   • Years of education: Not on file   • Highest education level: Not on file   Occupational History   • Occupation: Healthcare Executive   Tobacco Use   • Smoking status: Former Smoker     Types: Cigarettes     Quit date:      Years since quittin.7   • Smokeless tobacco: Never Used   Substance and Sexual Activity   • Alcohol use: No   • Drug use: No         Allergies   Allergen Reactions   • Beta Adrenergic Blockers    • Sulfa Antibiotics          Pain Scale: 0/10        ROS:  Review of Systems   Constitutional: Negative for activity change, appetite change and fatigue.   Eyes: Negative for pain, redness and itching.   Respiratory: Positive for cough. Negative for choking and shortness of breath.    Cardiovascular: Negative for chest pain and leg swelling.   Gastrointestinal: Negative for abdominal pain, nausea and vomiting.   Neurological: Positive for tremors. Negative for dizziness, seizures, syncope, facial asymmetry, speech difficulty, weakness, light-headedness, numbness and headaches.   Psychiatric/Behavioral: Positive for confusion and decreased concentration. Negative for agitation, behavioral problems, hallucinations and sleep disturbance. The patient is not nervous/anxious.          I have reviewed and agree with the above ROS completed by the medical assistant.      Physical Exam:  Vitals:    20 1114   Pulse: 52   SpO2: 98%   Weight: 73.9  "kg (163 lb)   Height: 177.8 cm (70\")     Orthostatic BP:    Body mass index is 23.39 kg/m².    Physical Exam  General: Well-developed white male no acute distress  HEENT: Normocephalic no evidence of trauma.  Neck: Supple  Heart: Regular rate and rhythm  Extremities: No pedal edema      Neurological Exam:   Mental Status: Awake, alert, oriented 9/10 on Mini-Mental state conversant without evidence of an affective disorder, thought disorder, delusions or hallucinations.  Attention span and concentration are normal.  HCF: No aphasia or dysarthria.  Some dyspraxia during the exam and his clock drawing was somewhat dysmorphic.  There was another number between 12 and 1 in the 1 started at about were 3:00 is but the remainder are pretty good and the hands are roughly in the right location.  His intersecting pentagons was abnormal.  Delayed recall 1 item of 3 and 3 minutes.  Knowledge of recent events intact.  Mini-Mental state score 25/30 with clock draw 3/4 and he named 12 animals in 1 minute.  CN: I:   II: Visual fields full without left inattention   III, IV, VI: Eye movements intact without nystagmus or ptosis.  Pupils equal  round and reactive to light.   V,VII: Light touch and pinprick intact all 3 divisions of V.  Facial muscles symmetrical.   VIII: Hearing intact to finger rub   IX,X: Soft palate elevates symmetrically   XI: Sternomastoid and trapezius are strong.   XII: Tongue midline without atrophy or fasciculations  Motor: Normal tone and bulk in the upper and lower extremities   Power testing: Full power in all muscles tested arms and legs  Reflexes: Upper extremities: Upper extremities trace        Lower extremities: Knee jerks +1        Toe signs: Downgoing  Sensory: Light touch: Diffusely intact        Pinprick:        Vibration:        Position:    Cerebellar: Finger-to-nose: Minimal postural tremor           Rapid movement: Intact           Heel-to-shin:  Gait and Station: Comes to stand easily.  " Ambulates with decent step length.  No festination or magnetic gait.  Turns around easily on arms swing well with gait.    Results:      Lab Results   Component Value Date    BUN 19 03/18/2020    CREATININE 0.97 03/18/2020    EGFRIFNONA 74 03/18/2020    EGFRIFAFRI 90 03/18/2020    BCR 19.6 03/18/2020    CO2 30.6 (H) 03/18/2020    CALCIUM 9.3 03/18/2020    PROTENTOTREF 6.7 03/18/2020    ALBUMIN 4.10 03/18/2020    LABIL2 1.6 03/18/2020    AST 16 03/18/2020    ALT 18 03/18/2020       Lab Results   Component Value Date    WBC 6.85 03/18/2020    HGB 16.3 03/18/2020    HCT 48.3 03/18/2020    MCV 91.3 03/18/2020     03/18/2020         .No results found for: RPR      Lab Results   Component Value Date    TSH 1.850 03/18/2020         Lab Results   Component Value Date    XOMQSONQ92 914 03/18/2020         Lab Results   Component Value Date    FOLATE 17.89 11/15/2016         Lab Results   Component Value Date    HGBA1C 6.40 (H) 03/18/2020         Lab Results   Component Value Date    BUN 19 03/18/2020    CREATININE 0.97 03/18/2020    EGFRIFNONA 74 03/18/2020    EGFRIFAFRI 90 03/18/2020    BCR 19.6 03/18/2020    K 4.0 03/18/2020    CO2 30.6 (H) 03/18/2020    CALCIUM 9.3 03/18/2020    PROTENTOTREF 6.7 03/18/2020    ALBUMIN 4.10 03/18/2020    LABIL2 1.6 03/18/2020    AST 16 03/18/2020    ALT 18 03/18/2020         Lab Results   Component Value Date    WBC 6.85 03/18/2020    HGB 16.3 03/18/2020    HCT 48.3 03/18/2020    MCV 91.3 03/18/2020     03/18/2020       CT brain films reviewed  Neuropsych testing was reviewed  Dr. Santiago and Presley Worthington notes were reviewed      Assessment:   1.  Frontotemporal degeneration-remarkably slow progress.  Functioning at a very high level despite the diagnosis and the duration he has had a  2.  Restless leg syndrome and sleep apnea, treated        Plan:  1.  I had a lengthy discussion with the patient and his wife.  He will continue current treatment with Rivastigmine focusing on the  memory per se.  2.  Paperwork for [place] filled out for cosme  3.  Follow-up in 6 months with Presley Worthington with follow-up Mini-Mental state            >50% of this 40-minute follow-up was spent counseling the patient and his wife on strategies to maintain cognitive function best.  This includes the control of any sleep disturbances and avid reading as well as routine exercise            Dictated utilizing Dragon dictation.

## 2020-09-21 RX ORDER — ERGOCALCIFEROL 1.25 MG/1
50000 CAPSULE ORAL
Qty: 12 CAPSULE | Refills: 1 | Status: SHIPPED | OUTPATIENT
Start: 2020-09-21 | End: 2021-05-10

## 2020-11-01 RX ORDER — PRAVASTATIN SODIUM 40 MG
TABLET ORAL
Qty: 90 TABLET | Refills: 0 | Status: SHIPPED | OUTPATIENT
Start: 2020-11-01 | End: 2021-01-12

## 2021-01-12 ENCOUNTER — TELEPHONE (OUTPATIENT)
Dept: INTERNAL MEDICINE | Facility: CLINIC | Age: 81
End: 2021-01-12

## 2021-01-12 RX ORDER — LOSARTAN POTASSIUM AND HYDROCHLOROTHIAZIDE 12.5; 5 MG/1; MG/1
1 TABLET ORAL DAILY
Qty: 90 TABLET | Refills: 1 | Status: SHIPPED | OUTPATIENT
Start: 2021-01-12 | End: 2021-01-18 | Stop reason: SDUPTHER

## 2021-01-12 RX ORDER — PRAVASTATIN SODIUM 40 MG
TABLET ORAL
Qty: 90 TABLET | Refills: 0 | Status: SHIPPED | OUTPATIENT
Start: 2021-01-12 | End: 2021-08-17

## 2021-01-12 RX ORDER — TAMSULOSIN HYDROCHLORIDE 0.4 MG/1
1 CAPSULE ORAL DAILY
Qty: 90 CAPSULE | Refills: 1 | Status: SHIPPED | OUTPATIENT
Start: 2021-01-12 | End: 2021-01-18 | Stop reason: SDUPTHER

## 2021-01-18 RX ORDER — LOSARTAN POTASSIUM AND HYDROCHLOROTHIAZIDE 12.5; 5 MG/1; MG/1
1 TABLET ORAL DAILY
Qty: 90 TABLET | Refills: 1 | Status: SHIPPED | OUTPATIENT
Start: 2021-01-18 | End: 2021-08-31

## 2021-01-18 RX ORDER — TAMSULOSIN HYDROCHLORIDE 0.4 MG/1
1 CAPSULE ORAL DAILY
Qty: 90 CAPSULE | Refills: 1 | Status: SHIPPED | OUTPATIENT
Start: 2021-01-18 | End: 2021-08-31

## 2021-02-01 DIAGNOSIS — E78.2 MIXED HYPERLIPIDEMIA: ICD-10-CM

## 2021-02-01 DIAGNOSIS — E11.9 TYPE 2 DIABETES MELLITUS WITHOUT COMPLICATION, UNSPECIFIED WHETHER LONG TERM INSULIN USE (HCC): ICD-10-CM

## 2021-02-01 DIAGNOSIS — I10 ESSENTIAL HYPERTENSION: Primary | ICD-10-CM

## 2021-02-01 LAB
ALBUMIN SERPL-MCNC: 4.1 G/DL (ref 3.5–5.2)
ALBUMIN/GLOB SERPL: 1.7 G/DL
ALP SERPL-CCNC: 51 U/L (ref 39–117)
ALT SERPL-CCNC: 13 U/L (ref 1–41)
AST SERPL-CCNC: 17 U/L (ref 1–40)
BASOPHILS # BLD AUTO: 0.03 10*3/MM3 (ref 0–0.2)
BASOPHILS NFR BLD AUTO: 0.4 % (ref 0–1.5)
BILIRUB SERPL-MCNC: 0.3 MG/DL (ref 0–1.2)
BUN SERPL-MCNC: 19 MG/DL (ref 8–23)
BUN/CREAT SERPL: 21.3 (ref 7–25)
CALCIUM SERPL-MCNC: 9.6 MG/DL (ref 8.6–10.5)
CHLORIDE SERPL-SCNC: 102 MMOL/L (ref 98–107)
CHOLEST SERPL-MCNC: 152 MG/DL (ref 0–200)
CO2 SERPL-SCNC: 31.7 MMOL/L (ref 22–29)
CREAT SERPL-MCNC: 0.89 MG/DL (ref 0.76–1.27)
EOSINOPHIL # BLD AUTO: 0.25 10*3/MM3 (ref 0–0.4)
EOSINOPHIL NFR BLD AUTO: 3 % (ref 0.3–6.2)
ERYTHROCYTE [DISTWIDTH] IN BLOOD BY AUTOMATED COUNT: 11.9 % (ref 12.3–15.4)
GLOBULIN SER CALC-MCNC: 2.4 GM/DL
GLUCOSE SERPL-MCNC: 99 MG/DL (ref 65–99)
HBA1C MFR BLD: 5.9 % (ref 4.8–5.6)
HCT VFR BLD AUTO: 46.1 % (ref 37.5–51)
HDLC SERPL-MCNC: 54 MG/DL (ref 40–60)
HGB BLD-MCNC: 15.7 G/DL (ref 13–17.7)
IMM GRANULOCYTES # BLD AUTO: 0.03 10*3/MM3 (ref 0–0.05)
IMM GRANULOCYTES NFR BLD AUTO: 0.4 % (ref 0–0.5)
LDLC SERPL CALC-MCNC: 78 MG/DL (ref 0–100)
LYMPHOCYTES # BLD AUTO: 1.64 10*3/MM3 (ref 0.7–3.1)
LYMPHOCYTES NFR BLD AUTO: 19.6 % (ref 19.6–45.3)
MCH RBC QN AUTO: 31.7 PG (ref 26.6–33)
MCHC RBC AUTO-ENTMCNC: 34.1 G/DL (ref 31.5–35.7)
MCV RBC AUTO: 92.9 FL (ref 79–97)
MONOCYTES # BLD AUTO: 0.72 10*3/MM3 (ref 0.1–0.9)
MONOCYTES NFR BLD AUTO: 8.6 % (ref 5–12)
NEUTROPHILS # BLD AUTO: 5.69 10*3/MM3 (ref 1.7–7)
NEUTROPHILS NFR BLD AUTO: 68 % (ref 42.7–76)
NRBC BLD AUTO-RTO: 0 /100 WBC (ref 0–0.2)
PLATELET # BLD AUTO: 232 10*3/MM3 (ref 140–450)
POTASSIUM SERPL-SCNC: 3.9 MMOL/L (ref 3.5–5.2)
PROT SERPL-MCNC: 6.5 G/DL (ref 6–8.5)
RBC # BLD AUTO: 4.96 10*6/MM3 (ref 4.14–5.8)
SODIUM SERPL-SCNC: 142 MMOL/L (ref 136–145)
TRIGL SERPL-MCNC: 112 MG/DL (ref 0–150)
TSH SERPL DL<=0.005 MIU/L-ACNC: 0.93 UIU/ML (ref 0.27–4.2)
VLDLC SERPL CALC-MCNC: 20 MG/DL (ref 5–40)
WBC # BLD AUTO: 8.36 10*3/MM3 (ref 3.4–10.8)

## 2021-02-08 ENCOUNTER — OFFICE VISIT (OUTPATIENT)
Dept: INTERNAL MEDICINE | Facility: CLINIC | Age: 81
End: 2021-02-08

## 2021-02-08 VITALS
HEIGHT: 70 IN | WEIGHT: 155 LBS | DIASTOLIC BLOOD PRESSURE: 82 MMHG | BODY MASS INDEX: 22.19 KG/M2 | OXYGEN SATURATION: 97 % | HEART RATE: 48 BPM | SYSTOLIC BLOOD PRESSURE: 134 MMHG

## 2021-02-08 DIAGNOSIS — I10 ESSENTIAL HYPERTENSION: Primary | ICD-10-CM

## 2021-02-08 DIAGNOSIS — F02.80 FRONTOTEMPORAL LOBAR DEGENERATION (HCC): ICD-10-CM

## 2021-02-08 DIAGNOSIS — E78.2 MIXED HYPERLIPIDEMIA: ICD-10-CM

## 2021-02-08 DIAGNOSIS — E11.9 TYPE 2 DIABETES MELLITUS WITHOUT COMPLICATION, UNSPECIFIED WHETHER LONG TERM INSULIN USE (HCC): ICD-10-CM

## 2021-02-08 DIAGNOSIS — G31.09 FRONTOTEMPORAL LOBAR DEGENERATION (HCC): ICD-10-CM

## 2021-02-08 PROCEDURE — 99214 OFFICE O/P EST MOD 30 MIN: CPT | Performed by: INTERNAL MEDICINE

## 2021-02-08 NOTE — PROGRESS NOTES
Subjective     Calos Lam is a 80 y.o. male who presents with   Chief Complaint   Patient presents with   • Diabetes   • Hypertension   • Hyperlipidemia       History of Present Illness     The following data was reviewed by: Karlie Hart MD on 02/08/2021:  Common labs    Common Labsle 3/18/20 3/18/20 3/18/20 3/18/20 3/18/20 2/1/21 2/1/21 2/1/21 2/1/21    0855 0855 0855 0855 0855 1050 1050 1050 1050   Glucose  121 (A)     99     BUN  19     19     Creatinine  0.97     0.89     eGFR Non  Am  74     82     eGFR  Am  90     99     Sodium  139     142     Potassium  4.0     3.9     Chloride  98     102     Calcium  9.3     9.6     Total Protein  6.7     6.5     Albumin  4.10     4.10     Total Bilirubin  0.6     0.3     Alkaline Phosphatase  55     51     AST (SGOT)  16     17     ALT (SGPT)  18     13     WBC 6.85     8.36      Hemoglobin 16.3     15.7      Hematocrit 48.3     46.1      Platelets 233     232      Total Cholesterol   173     152    Triglycerides   98     112    HDL Cholesterol   52     54    LDL Cholesterol    101 (A)     78    Hemoglobin A1C    6.40 (A)     5.90 (A)   Microalbumin, Urine     9.8       (A) Abnormal value       Comments are available for some flowsheets but are not being displayed.           Dm-2.  Good BS control.     HTN.  Control is excellent.   HLD.  He is maintained on pravastatin with good control.  FTD.  Discussed Dr. Lozoya's last OV with him.    Reviewed and updated 2/8/2021    Review of Systems   Constitutional: Negative for fever.   Respiratory: Negative.    Cardiovascular: Negative.        The following portions of the patient's history were reviewed and updated as appropriate: allergies, current medications and problem list.    Patient Active Problem List    Diagnosis Date Noted   • B12 deficiency 09/11/2019     Note Last Updated: 9/16/2019     IF is normal.  Recommend oral supplement.       • Vitamin D deficiency 03/07/2019   • Incomplete RBBB  "03/07/2019   • PLMD (periodic limb movement disorder) 02/10/2018   • Colon polyp 07/25/2017     Note Last Updated: 8/31/2017     Overview:   Added automatically from request for surgery [id number]     • History of bladder cancer 02/18/2017   • RLS (restless legs syndrome) 02/18/2017   • Type 2 diabetes mellitus without complication (CMS/[facility]) 02/15/2017   • Essential hypertension    • Mixed hyperlipidemia    • Frontotemporal dementia (CMS/HCC) 01/18/2017   • Sleep-related hypoventilation/hypoxia  12/11/2016       Current Outpatient Medications on File Prior to Visit   Medication Sig Dispense Refill   • aliskiren (TEKTURNA) 300 MG tablet Tekturna 300 mg tablet   take 1 tablet by mouth once daily     • amLODIPine (NORVASC) 10 MG tablet Take 1 tablet by mouth Daily. 180 tablet 1   • ASPIRIN 81 PO Take  by mouth Daily.     • BYSTOLIC 10 MG tablet Take 1 tablet by mouth Daily. 90 tablet 0   • Cyanocobalamin (VITAMIN B-12 PO) Take  by mouth.     •  MG tablet      • losartan-hydrochlorothiazide (HYZAAR) 50-12.5 MG per tablet Take 1 tablet by mouth Daily. 90 tablet 1   • omeprazole (PRILOSEC) 20 MG capsule Prilosec 20 mg capsule,delayed release   Take 1 capsule every day by oral route.     • pramipexole (MIRAPEX) 0.25 MG tablet Take 2 tablets by mouth Every Night. 60 tablet 3   • pravastatin (PRAVACHOL) 40 MG tablet TAKE 1 TABLET EVERY DAY 90 tablet 0   • rivastigmine (EXELON) 13.3 MG/24HR patch Place 1 patch on the skin as directed by provider Daily. 90 patch 3   • solifenacin (VESICARE) 10 MG tablet Vesicare 10 mg tablet   Take 1 tablet every day by oral route.     • tamsulosin (FLOMAX) 0.4 MG capsule 24 hr capsule Take 1 capsule by mouth Daily. 90 capsule 1   • vitamin D (ERGOCALCIFEROL) 1.25 MG (97215 UT) capsule capsule TAKE 1 CAPSULE BY MOUTH EVERY 7 (SEVEN) DAYS. 12 capsule 1     No current facility-administered medications on file prior to visit.        Objective     /82   Pulse (!) 48   Ht 177.8 cm (70\")  "  Wt 70.3 kg (155 lb)   SpO2 97%   BMI 22.24 kg/m²     Physical Exam  Constitutional:       Appearance: He is well-developed.   HENT:      Head: Atraumatic.   Cardiovascular:      Rate and Rhythm: Normal rate and regular rhythm.      Heart sounds: Normal heart sounds.   Pulmonary:      Effort: Pulmonary effort is normal.      Breath sounds: Normal breath sounds.   Skin:     General: Skin is warm and dry.   Neurological:      Mental Status: He is alert and oriented to person, place, and time.         Assessment/Plan   Diagnoses and all orders for this visit:    1. Essential hypertension (Primary)    2. Type 2 diabetes mellitus without complication, unspecified whether long term insulin use (CMS/[facility])    3. Mixed hyperlipidemia        Discussion    HTN.  Continue current.    HLD.  The patient is advised to continue current dosage of pravastatin.    DM-2.  Continue healthy diet.    Dementia.  The patient is advised to continue current dosage of Exelon.           Future Appointments   Date Time Provider Department Center   3/12/2021  1:00 PM Presley Kyle APRN [facility]   8/5/2021 10:00 AM [facility]   8/11/2021 10:00 AM Karlie Hart MD [facility]

## 2021-03-05 RX ORDER — NEBIVOLOL HYDROCHLORIDE 10 MG/1
TABLET ORAL
Qty: 90 TABLET | Refills: 0 | Status: SHIPPED | OUTPATIENT
Start: 2021-03-05 | End: 2021-06-10

## 2021-03-15 ENCOUNTER — BULK ORDERING (OUTPATIENT)
Dept: CASE MANAGEMENT | Facility: OTHER | Age: 81
End: 2021-03-15

## 2021-03-15 DIAGNOSIS — Z23 IMMUNIZATION DUE: ICD-10-CM

## 2021-05-04 ENCOUNTER — OFFICE VISIT (OUTPATIENT)
Dept: NEUROLOGY | Facility: CLINIC | Age: 81
End: 2021-05-04

## 2021-05-04 VITALS
OXYGEN SATURATION: 94 % | BODY MASS INDEX: 22.43 KG/M2 | HEIGHT: 70 IN | DIASTOLIC BLOOD PRESSURE: 68 MMHG | SYSTOLIC BLOOD PRESSURE: 130 MMHG | WEIGHT: 156.7 LBS | HEART RATE: 49 BPM

## 2021-05-04 DIAGNOSIS — F02.80 FRONTOTEMPORAL DEMENTIA (HCC): Primary | ICD-10-CM

## 2021-05-04 DIAGNOSIS — G31.09 FRONTOTEMPORAL DEMENTIA (HCC): Primary | ICD-10-CM

## 2021-05-04 DIAGNOSIS — G25.81 RLS (RESTLESS LEGS SYNDROME): ICD-10-CM

## 2021-05-04 PROCEDURE — 99213 OFFICE O/P EST LOW 20 MIN: CPT | Performed by: NURSE PRACTITIONER

## 2021-05-04 NOTE — PROGRESS NOTES
Subjective:     Patient ID: Calos Lam is a 80 y.o. male presenting for follow up for frontotemporal dementia. He was last seen by Dr. Lozoya on 9/17/20. He is accompanied by his wife who helps provide history as well.     She notes he is doing very well and denies much decline if at al since his last visit. He continues to remain very active both physically and mentally. He does continue to have trouble with some behavioral changes such as passing gas in public, leaving doors open, not finishing tasks, ut overall is doing well. The patient does continue to drive but has limitations regarding not driving at night and only driving short distances to places he often goes to. He drives to the library quite a bit. He walks a few miles at least 3 days a week.     He continues Exelon patch 13.3 mg per 24 hours. He discussed Namenda with Dr. Lozoya at his last visit and it was not felt that it was needed at this time. He is on vitamin B12 supplementation as well as vitamin D.     His sleep apnea is treated with a mouthpiece. He was unable to tolerate CPAP. His restless legs are treated with pramipexole.     He previously was a heavy drinker. He and his wife state he drank several bourbons per night. He no longer drinks.     History of Present Illness  The following portions of the patient's history were reviewed and updated as appropriate: allergies, current medications, past family history, past medical history, past social history, past surgical history and problem list.    Review of Systems   Constitutional: Negative for chills, fatigue and fever.   HENT: Negative for hearing loss, tinnitus and trouble swallowing.    Eyes: Negative for pain and itching.   Respiratory: Negative for cough, shortness of breath and wheezing.    Cardiovascular: Negative for chest pain, palpitations and leg swelling.   Gastrointestinal: Negative for diarrhea, nausea and vomiting.   Endocrine: Negative for cold intolerance, heat intolerance  and polydipsia.   Genitourinary: Negative for decreased urine volume, difficulty urinating and urgency.   Musculoskeletal: Positive for gait problem. Negative for back pain, neck pain and neck stiffness.   Skin: Negative for color change, rash and wound.   Allergic/Immunologic: Negative for environmental allergies, food allergies and immunocompromised state.   Neurological: Positive for tremors. Negative for dizziness, seizures, syncope, facial asymmetry, speech difficulty, weakness, light-headedness, numbness and headaches.   Hematological: Negative for adenopathy. Bruises/bleeds easily.   Psychiatric/Behavioral: Positive for confusion and decreased concentration. Negative for sleep disturbance. The patient is not nervous/anxious.         Objective:    Neurologic Exam  Mental Status: Awake, alert, oriented x3.   HCF: No aphasia or dysarthria.  Knowledge of recent events intact.  Mini-Mental state score 25/30 with clock draw 3/4 and he named 12 animals in 1 minute at his last visit. Not repeated today.   CN:      I:              II: Visual fields full without left inattention              III, IV, VI: Eye movements intact without nystagmus or ptosis.  Pupils equal  round and reactive to light.              V,VII: Light touch and pinprick intact all 3 divisions of V.  Facial muscles symmetrical.              VIII: Hearing intact to finger rub              IX,X: Soft palate elevates symmetrically              XI: Sternomastoid and trapezius are strong.              XII: Tongue midline without atrophy or fasciculations  Motor: Normal tone and bulk in the upper and lower extremities              Power testing: Full power in all muscles tested arms and legs  Reflexes: Upper extremities: Upper extremities trace                   Lower extremities: Knee jerks +1                   Toe signs: Downgoing  Sensory: Light touch: Diffusely intact  Cerebellar: Finger-to-nose: Mild postural tremor                      Rapid  movement: Intact                      Heel-to-shin: Normal  Gait and Station: Normal    Physical Exam  General: 80-year-old male in no acute distress.  HEENT: Normocephalic, no evidence of trauma.   Neck: Supple.   Heart: RRR  Extremities: No pedal edema.     Assessment/Plan:     Diagnoses and all orders for this visit:    1. Frontotemporal dementia (CMS/HCC) (Primary)    2. RLS (restless legs syndrome)      1. Frontotemporal dementia: He is doing very well. I do not notice any decline compared to previous visits and his wife agrees with this. Continues to function at a very high level. We discussed the importance of continued physical activity as well as cognitive activity. He exercises at least 3 days a week and reads quite a bit. Continues to go to the library. Is able to drive short distances.   2. Restless legs syndrome and sleep apnea are both treated and well controlled at this time.

## 2021-05-10 RX ORDER — ERGOCALCIFEROL 1.25 MG/1
CAPSULE ORAL
Qty: 12 CAPSULE | Refills: 1 | Status: SHIPPED | OUTPATIENT
Start: 2021-05-10 | End: 2021-08-11

## 2021-06-10 DIAGNOSIS — I10 ESSENTIAL HYPERTENSION: ICD-10-CM

## 2021-06-10 RX ORDER — AMLODIPINE BESYLATE 10 MG/1
TABLET ORAL
Qty: 90 TABLET | Refills: 1 | Status: SHIPPED | OUTPATIENT
Start: 2021-06-10 | End: 2022-05-13

## 2021-06-10 RX ORDER — NEBIVOLOL HYDROCHLORIDE 10 MG/1
TABLET ORAL
Qty: 90 TABLET | Refills: 0 | Status: SHIPPED | OUTPATIENT
Start: 2021-06-10 | End: 2021-08-31

## 2021-06-15 RX ORDER — RIVASTIGMINE 13.3 MG/24H
PATCH, EXTENDED RELEASE TRANSDERMAL
Qty: 30 PATCH | Refills: 11 | Status: SHIPPED | OUTPATIENT
Start: 2021-06-15 | End: 2022-07-15

## 2021-07-13 ENCOUNTER — TELEPHONE (OUTPATIENT)
Dept: NEUROLOGY | Facility: CLINIC | Age: 81
End: 2021-07-13

## 2021-07-13 DIAGNOSIS — G31.09 FRONTOTEMPORAL DEMENTIA (HCC): Primary | ICD-10-CM

## 2021-07-13 DIAGNOSIS — F02.80 FRONTOTEMPORAL DEMENTIA (HCC): Primary | ICD-10-CM

## 2021-07-13 NOTE — TELEPHONE ENCOUNTER
Pt says he's in need of a speech therapist that specializes in dementia pt's.  Please call pt at [phone].    ThanksCora

## 2021-07-30 ENCOUNTER — HOSPITAL ENCOUNTER (OUTPATIENT)
Dept: SPEECH THERAPY | Facility: HOSPITAL | Age: 81
Setting detail: THERAPIES SERIES
Discharge: HOME | End: 2021-07-30

## 2021-07-30 DIAGNOSIS — F02.80 FRONTOTEMPORAL DEMENTIA (HCC): ICD-10-CM

## 2021-07-30 DIAGNOSIS — R41.841 COGNITIVE COMMUNICATION DEFICIT: Primary | ICD-10-CM

## 2021-07-30 DIAGNOSIS — G31.09 FRONTOTEMPORAL DEMENTIA (HCC): ICD-10-CM

## 2021-07-30 PROCEDURE — 96125 COGNITIVE TEST BY HC PRO: CPT | Performed by: SPEECH-LANGUAGE PATHOLOGIST

## 2021-07-30 NOTE — THERAPY EVALUATION
Outpatient Speech Language Pathology   Adult Speech Language Cognitive Initial Evaluation  [facility]     Patient Name: Calos Lam  : 1940  MRN: [id number]  Today's Date: 2021        Visit Date: 2021   Patient Active Problem List   Diagnosis   • Sleep-related hypoventilation/hypoxia    • Frontotemporal dementia (CMS/HCC)   • Essential hypertension   • Mixed hyperlipidemia   • Type 2 diabetes mellitus without complication (CMS/HCC)   • History of bladder cancer   • RLS (restless legs syndrome)   • Colon polyp   • PLMD (periodic limb movement disorder)   • Vitamin D deficiency   • Incomplete RBBB   • B12 deficiency        Past Medical History:   Diagnosis Date   • Essential hypertension    • GERD (gastroesophageal reflux disease)    • Impacted cerumen    • Impotence of organic origin    • Malaise and fatigue    • Mantoux: positive    • Memory loss    • Mixed hyperlipidemia    • FIORELLA (obstructive sleep apnea)     Treated with an oral appliance   • Pain in thoracic spine    • Primary malignant neoplasm of bladder (CMS/HCC) 2009   • Restless leg syndrome, uncontrolled    • Testicular hypofunction    • Vertigo    • Vitamin D deficiency         Past Surgical History:   Procedure Laterality Date   • APPENDECTOMY     • COLON RESECTION LEFT Left    • COLONOSCOPY  2012    normal   • EYE SURGERY      lasik. Dr. Storey   • LASIK Bilateral    • ROOT CANAL     • TURP / TRANSURETHRAL INCISION / DRAINAGE PROSTATE           Visit Dx:    ICD-10-CM ICD-9-CM   1. Cognitive communication deficit  R41.841 799.52   2. Frontotemporal dementia (CMS/HCC)  G31.09 331.19    F02.80      Patient was wearing a face mask during this therapy encounter. Therapist used appropriate personal protective equipment including mask, eye protection and gloves.  Mask used was standard procedure mask. Appropriate PPE was worn during the entire therapy session. Hand hygiene was completed before and after  therapy session. Patient is not in enhanced droplet precautions.                 OP SLP Assessment/Plan - 07/30/21 1617        SLP Assessment    Functional Problems  Speech Language- Adult/Cognition   -KA    Impact on Function: Adult Speech Language/Cognition  Difficulty communicating wants, needs, and ideas;Difficulty communicating in a medical emergency;Restrictions in personal and social life;Difficulty participating in avocational activities;Trouble learning or remembering new information;Requires supervision   -KA    Clinical Impression: Speech Language-Adult/Congnition  Mild-Moderate:;Cognitive Communication Impairment   -KA    Please refer to paper survey for additional self-reported information  Yes   -KA    Please refer to items scanned into chart for additional diagnostic informaiton and handouts as provided by clinician  Yes   -KA    Prognosis  Good (comment)   -KA    Patient/caregiver participated in establishment of treatment plan and goals  Yes   -KA    Patient would benefit from skilled therapy intervention  Yes   -KA       SLP Plan    Frequency  1x a week   -KA    Duration  8 weeks   -KA    Planned CPT's?  SLP DEV COG SKILLS INITIAL (15 MIN) : 39550;SLP DEV COG SKILLS ADD (15 MIN) : 26013   -KA    Expected Duration of Therapy Session (SLP Eval)  45   -KA      User Key  (r) = Recorded By, (t) = Taken By, (c) = Cosigned By    Initials Name Provider Type    KA Elian Peres MA,CCC-SLP Speech and Language Pathologist          SLP SLC Evaluation - 07/30/21 1500        Communication Assessment/Intervention    Document Type  evaluation   -KA    Subjective Information  no complaints   -KA    Patient Observations  alert;cooperative   -KA    Care Plan Review  evaluation/treatment results reviewed   -KA    Care Plan Review, Other Participant(s)  spouse    spouse present   -KA    Patient Effort  good   -KA    Symptoms Noted During/After Treatment  none   -KA       General Information    Patient Profile  "Reviewed  yes   -KA    Pertinent History Of Current Problem  Patient referred to outpatient speech therapy for cognitive communication therapy, patient has history of frontotemporal dementia diagnosed four years ago. Wife provided alot of background information today including patient was evaluated by Neuropsychologist four years ago and patient's Neurologist is Dr Lozoya.. Patient's wife reported patient was told \"he is the first patient with the slowest decline in any form the MD has seen.\" Patient states his goals are to improve his voice loudness and improve word finding. Wife reports patient speaks low and has difficulty finding his words. She also reports patient has deficits in sequencing and judegement. Patient reports he is very active, walks three miles a day, reads and gardens.   -KA    Precautions/Limitations, Vision  WFL with corrective lenses   -KA    Precautions/Limitations, Hearing  hearing impairment, bilaterally   -KA    Patient Level of Education  worked in healthcare administration    -KA    Prior Level of Function-Communication  WFL;other (see comments)    prior to dementia   -KA    Plans/Goals Discussed with  patient and family;agreed upon   -KA    Barriers to Rehab  none identified   -KA    Patient's Goals for Discharge  other (see comments)    see above  -KA    Family Goals for Discharge  other (see comments)    see above  -KA    Standardized Assessment Used  CLQT   -KA       Standardized Tests    Cognitive/Memory Tests  CLQT: Cognitive Linguistic Quick Test   -KA       CLQT (The Cognitive Linguistic Quick Test)    Attention Domain Score  34   -KA    Attention Severity Rating  1: Severe   -KA    Memory Domain Score  154   -KA    Memory Severity Rating  4: WNL   -KA    Executive Function Domain Score  12   -KA    Executive Function Severity Rating  2: Moderate   -KA    Language Domain Score  31   -KA    Language Severity Rating  4: WNL   -KA    Visuospatial Domain Score  28   -KA    " Visuospatial Severity Rating  2: Moderate   -KA    Clock Drawing Total Score  7   -KA    Clock Drawing Severity Rating  Moderate   -KA    Composite Severity Rating  2.6   -KA    Composite Severity Rating Range  3.4 - 2.5: Mild   -KA    CLQT Comments  Patient demonstrates mild to moderate cognitive communication impairment and scored below the criterion cut off scores in symbol cancellation task (scored 0 with patient circling two symbols), clock drawing task (unable to set hands to the correct time), symbol trials, maze and design generation including deficits in mental flexibility, planning, visuospatial, following directions and reasoning. In generative naming task patient named 20 animals within 60 seconds (concrete category) and 8 in abstract category. In story retelling task patient able to recall 12 facts out of 18. Will continue diagnostic assessment of patient cognitive communication skills with therapy targeting education on strategies.    -KA       SLP Clinical Impressions    SLP Diagnosis  mild to moderate cognitive communication skills   -KA    Rehab Potential/Prognosis  good   -KA    [facility] [place] Criteria for Skilled Therapy Interventions Met  yes   -KA    Functional Impact  difficulty completing vocational tasks;difficulty completing home management task;Poor Judgement;restrictions in personal and social life;difficulty communicating in an emergency;difficulty communicating wants, needs   -KA       Recommendations    Therapy Frequency (SLP SLC)  1 day per week   -KA    Predicted Duration Therapy Intervention (Days)  until discharge   -KA    Anticipated Discharge Disposition (SLP)  home with assist   -KA      User Key  (r) = Recorded By, (t) = Taken By, (c) = Cosigned By    Initials Name Provider Type    Elian Shields MA,CCC-SLP Speech and Language Pathologist                         OP SLP Education     Row Name 07/30/21 8979       Education    Barriers to Learning  No barriers identified  -KA    Education  Provided  Described results of evaluation;Patient expressed understanding of evaluation;Family/caregivers require further education on strategies, risks;Patient requires further education on strategies, risks  -KA    Assessed  Learning needs;Learning motivation  -KA    Learning Motivation  Strong  -KA    Learning Method  Explanation  -KA    Teaching Response  Verbalized understanding  -KA      User Key  (r) = Recorded By, (t) = Taken By, (c) = Cosigned By    Initials Name Effective Dates    KA Elian Peres MA,[facility]-SLP 06/16/21 -         SLP OP Goals     Row Name 07/30/21 1600          Goal Type Needed    Goal Type Needed  Memory;Verbal Expression  -KA        Subjective Pain    Able to rate subjective pain?  yes  -KA     Pre-Treatment Pain Level  0  -KA     Post-Treatment Pain Level  0  -KA        Verbal Expression Goals    Verbal Expression LTG's  Patient will be able to use verbal expressive language skills to communicate effectively in all situations with  familiar listener  -KA     Status: Patient will be able to use verbal expressive language skills to communicate effectively in all situations with  familiar listener  New  -KA     Verbal Expression STG's  Patient will improve verbal expressive language skills by utilizing self-cueing strategies when encountering difficulty with expressive language;Patient will improve verbal expressive language skills by completing divergent naming tasks;Patient will improve verbal expressive language skills by describing attributes, function, action and/or uses of an object/item  -KA     Patient will improve verbal expressive language skills by completing divergent naming tasks  90%:;with cues  -KA     Status: Patient will improve verbal expressive language skills by completing divergent naming tasks  New  -KA     Patient will improve verbal expressive language skills by describing attributes, function, action and/or uses of an object/item  90%:;with cues  -KA     Status:  Patient will improve verbal expressive language skills by describing attributes, function, action and/or uses of an object/item  New  -KA     Patient will improve verbal expressive language skills by utilizing self-cueing strategies when encountering difficulty with expressive language  90%:;with cues  -KA     Status: Patient will improve verbal expressive language skills by utilizing self-cueing strategies when encountering difficulty with expressive language  New  -KA        Memory Goals    Memory LTG's  Patient and family will implement compensatory strategies to maximize patient’s Memory function so patient can continue to participate in daily activities  -KA     Patient and family will implement compensatory strategies to maximize patient’s Memory function so patient can continue to participate in daily activities  90%:;with cues  -KA     Status: Patient and family will implement compensatory strategies to maximize patient’s Memory function so patient can continue to participate in daily activities  New  -KA     Memory STG's  Patient’s memory skills will be enhanced as reported by patient by using external memory aides  -KA     Patient’s memory skills will be enhanced as reported by patient by using external memory aides  90%:;with cues  -KA     Status: Patient’s memory skills will be enhanced as reported by patient by using external memory aides  New  -KA       User Key  (r) = Recorded By, (t) = Taken By, (c) = Cosigned By    Initials Name Provider Type    Elian Shields MA,CCC-SLP Speech and Language Pathologist           SLP Outcome Measures (last 72 hours)      SLP Outcome Measures     Row Name 07/30/21 1600             SLP Outcome Measures    Outcome Measure Used?  Adult NOMS  -KA         Adult FCM Scores    FCM Chosen  Memory  -KA      Memory FCM Score  4  -KA        User Key  (r) = Recorded By, (t) = Taken By, (c) = Cosigned By    Initials Name Effective Dates    Elian Shields MA,CCC-SLP  21 -            15:30 to 16:30, 8:30 to 8:45  Time Calculation:   SLP Start Time: 1430  SLP Stop Time: 1515  SLP Time Calculation (min): 45 min  Timed Charges  96125-Standardized cognitive performance testin  Total Minutes  Timed Charges Total Minutes: 120   Total Minutes: 120    Therapy Charges for Today     Code Description Service Date Service Provider Modifiers Qty    72216158299 HC ST STD COG PERF TEST PER HOUR 2021 Elian Peres MA,CCC-SLP GN 2                   Elian Peres MA,CCC-SLP  2021

## 2021-08-05 DIAGNOSIS — I10 ESSENTIAL HYPERTENSION: Primary | ICD-10-CM

## 2021-08-05 DIAGNOSIS — E55.9 VITAMIN D DEFICIENCY: ICD-10-CM

## 2021-08-05 DIAGNOSIS — E78.2 MIXED HYPERLIPIDEMIA: ICD-10-CM

## 2021-08-05 DIAGNOSIS — E53.8 B12 DEFICIENCY: ICD-10-CM

## 2021-08-05 DIAGNOSIS — E11.9 TYPE 2 DIABETES MELLITUS WITHOUT COMPLICATION, UNSPECIFIED WHETHER LONG TERM INSULIN USE (HCC): ICD-10-CM

## 2021-08-05 LAB
25(OH)D3+25(OH)D2 SERPL-MCNC: 64.3 NG/ML (ref 30–100)
ALBUMIN SERPL-MCNC: 4.1 G/DL (ref 3.5–5.2)
ALBUMIN/GLOB SERPL: 1.7 G/DL
ALP SERPL-CCNC: 55 U/L (ref 39–117)
ALT SERPL-CCNC: 14 U/L (ref 1–41)
AST SERPL-CCNC: 17 U/L (ref 1–40)
BASOPHILS # BLD AUTO: 0.04 10*3/MM3 (ref 0–0.2)
BASOPHILS NFR BLD AUTO: 0.5 % (ref 0–1.5)
BILIRUB SERPL-MCNC: 0.6 MG/DL (ref 0–1.2)
BUN SERPL-MCNC: 20 MG/DL (ref 8–23)
BUN/CREAT SERPL: 20.8 (ref 7–25)
CALCIUM SERPL-MCNC: 9.4 MG/DL (ref 8.6–10.5)
CHLORIDE SERPL-SCNC: 105 MMOL/L (ref 98–107)
CHOLEST SERPL-MCNC: 216 MG/DL (ref 0–200)
CO2 SERPL-SCNC: 30.6 MMOL/L (ref 22–29)
CREAT SERPL-MCNC: 0.96 MG/DL (ref 0.76–1.27)
EOSINOPHIL # BLD AUTO: 0.2 10*3/MM3 (ref 0–0.4)
EOSINOPHIL NFR BLD AUTO: 2.6 % (ref 0.3–6.2)
ERYTHROCYTE [DISTWIDTH] IN BLOOD BY AUTOMATED COUNT: 12.8 % (ref 12.3–15.4)
GLOBULIN SER CALC-MCNC: 2.4 GM/DL
GLUCOSE SERPL-MCNC: 116 MG/DL (ref 65–99)
HBA1C MFR BLD: 5.9 % (ref 4.8–5.6)
HCT VFR BLD AUTO: 46.3 % (ref 37.5–51)
HDLC SERPL-MCNC: 59 MG/DL (ref 40–60)
HGB BLD-MCNC: 16.1 G/DL (ref 13–17.7)
IMM GRANULOCYTES # BLD AUTO: 0.02 10*3/MM3 (ref 0–0.05)
IMM GRANULOCYTES NFR BLD AUTO: 0.3 % (ref 0–0.5)
LDLC SERPL CALC-MCNC: 138 MG/DL (ref 0–100)
LYMPHOCYTES # BLD AUTO: 1.41 10*3/MM3 (ref 0.7–3.1)
LYMPHOCYTES NFR BLD AUTO: 18.4 % (ref 19.6–45.3)
MCH RBC QN AUTO: 31.8 PG (ref 26.6–33)
MCHC RBC AUTO-ENTMCNC: 34.8 G/DL (ref 31.5–35.7)
MCV RBC AUTO: 91.5 FL (ref 79–97)
MONOCYTES # BLD AUTO: 0.62 10*3/MM3 (ref 0.1–0.9)
MONOCYTES NFR BLD AUTO: 8.1 % (ref 5–12)
NEUTROPHILS # BLD AUTO: 5.38 10*3/MM3 (ref 1.7–7)
NEUTROPHILS NFR BLD AUTO: 70.1 % (ref 42.7–76)
NRBC BLD AUTO-RTO: 0 /100 WBC (ref 0–0.2)
PLATELET # BLD AUTO: 231 10*3/MM3 (ref 140–450)
POTASSIUM SERPL-SCNC: 4.5 MMOL/L (ref 3.5–5.2)
PROT SERPL-MCNC: 6.5 G/DL (ref 6–8.5)
RBC # BLD AUTO: 5.06 10*6/MM3 (ref 4.14–5.8)
SODIUM SERPL-SCNC: 141 MMOL/L (ref 136–145)
TRIGL SERPL-MCNC: 107 MG/DL (ref 0–150)
TSH SERPL DL<=0.005 MIU/L-ACNC: 0.91 UIU/ML (ref 0.27–4.2)
UNABLE TO VOID: NORMAL
VIT B12 SERPL-MCNC: 612 PG/ML (ref 211–946)
VLDLC SERPL CALC-MCNC: 19 MG/DL (ref 5–40)
WBC # BLD AUTO: 7.67 10*3/MM3 (ref 3.4–10.8)

## 2021-08-06 ENCOUNTER — APPOINTMENT (OUTPATIENT)
Dept: SPEECH THERAPY | Facility: HOSPITAL | Age: 81
End: 2021-08-06

## 2021-08-10 ENCOUNTER — HOSPITAL ENCOUNTER (OUTPATIENT)
Dept: SPEECH THERAPY | Facility: HOSPITAL | Age: 81
Setting detail: THERAPIES SERIES
Discharge: HOME | End: 2021-08-10

## 2021-08-10 DIAGNOSIS — G31.09 FRONTOTEMPORAL DEMENTIA (HCC): Primary | ICD-10-CM

## 2021-08-10 DIAGNOSIS — R41.841 COGNITIVE COMMUNICATION DEFICIT: ICD-10-CM

## 2021-08-10 DIAGNOSIS — F02.80 FRONTOTEMPORAL DEMENTIA (HCC): Primary | ICD-10-CM

## 2021-08-10 PROCEDURE — 97130 THER IVNTJ EA ADDL 15 MIN: CPT | Performed by: SPEECH-LANGUAGE PATHOLOGIST

## 2021-08-10 PROCEDURE — 97129 THER IVNTJ 1ST 15 MIN: CPT | Performed by: SPEECH-LANGUAGE PATHOLOGIST

## 2021-08-10 NOTE — THERAPY TREATMENT NOTE
"Outpatient Speech Language Pathology   Adult Speech Language Cognitive Treatment Note  [facility]     Patient Name: Calos Lam  : 1940  MRN: [id number]  Today's Date: 8/10/2021         Visit Date: 08/10/2021   Patient Active Problem List   Diagnosis   • Sleep-related hypoventilation/hypoxia    • Frontotemporal dementia (CMS/HCC)   • Essential hypertension   • Mixed hyperlipidemia   • Type 2 diabetes mellitus without complication (CMS/HCC)   • History of bladder cancer   • RLS (restless legs syndrome)   • Colon polyp   • PLMD (periodic limb movement disorder)   • Vitamin D deficiency   • Incomplete RBBB   • B12 deficiency          Visit Dx:    ICD-10-CM ICD-9-CM   1. Frontotemporal dementia (CMS/HCC)  G31.09 331.19    F02.80    2. Cognitive communication deficit  R41.841 799.52                Ongoing diagnostic assessment completed today, delayed recall of three unrelated words after 5 and 30 min delay 100% without cues, concrete convergent thought organization 80% without cues and abstract 80% without cues and 100% with min cues. Functional 5 step sequencing lapa=702%. Education completed on external memory aids, including use of planners, notebooks, dry eraser board and calendar. Patient and wife communicated they use external memory aids effectively and feel a dry eraser board might be the only form of additional aid they would benefit from. Patient and wife voiced their greatest concern is pt's decreased voice volume and speech is not intelligible \"a lot of family members have a difficult time hearing me.\" SLP provided education on EMST and will plan on providing education on oral motor exercises and strategies. Patient speech is 100% intelligible in SLP office, no background noise.  SLP also educated patient and wife on communication strategies including writing down detailed or lengthier instructions and goal to only provide simply or short directions verbally.                       Time " Calculation:   SLP Start Time: 1515  SLP Stop Time: 1600  SLP Time Calculation (min): 45 min  Timed Charges  25509-TS Dev of Cogn Skills Initial Minutes: 15  83143-MI Dev of Cogn Skills Add Minutes: 30  Total Minutes  Timed Charges Total Minutes: 45   Total Minutes: 45    Therapy Charges for Today     Code Description Service Date Service Provider Modifiers Qty    37211232171 HC ST DEV OF COGN SKILLS INITIAL 15 MIN 8/10/2021 Elian Peres MA,CCC-SLP  1    50492795782 HC ST DEV OF COGN SKILLS EACH ADDT'L 15 MIN 8/10/2021 Elian Peres MA,CCC-SLP  2                   Elian Peres MA,CCC-SLP  8/10/2021

## 2021-08-11 ENCOUNTER — OFFICE VISIT (OUTPATIENT)
Dept: INTERNAL MEDICINE | Facility: CLINIC | Age: 81
End: 2021-08-11

## 2021-08-11 VITALS
WEIGHT: 151 LBS | HEART RATE: 50 BPM | OXYGEN SATURATION: 98 % | DIASTOLIC BLOOD PRESSURE: 64 MMHG | BODY MASS INDEX: 21.67 KG/M2 | SYSTOLIC BLOOD PRESSURE: 132 MMHG

## 2021-08-11 DIAGNOSIS — H61.23 CERUMINOSIS, BILATERAL: ICD-10-CM

## 2021-08-11 DIAGNOSIS — Z00.00 MEDICARE ANNUAL WELLNESS VISIT, SUBSEQUENT: Primary | ICD-10-CM

## 2021-08-11 DIAGNOSIS — E11.9 TYPE 2 DIABETES MELLITUS WITHOUT COMPLICATION, UNSPECIFIED WHETHER LONG TERM INSULIN USE (HCC): ICD-10-CM

## 2021-08-11 DIAGNOSIS — Z00.00 WELL ADULT EXAM: ICD-10-CM

## 2021-08-11 DIAGNOSIS — E78.2 MIXED HYPERLIPIDEMIA: ICD-10-CM

## 2021-08-11 DIAGNOSIS — I10 ESSENTIAL HYPERTENSION: ICD-10-CM

## 2021-08-11 PROBLEM — K63.5 COLON POLYP: Status: RESOLVED | Noted: 2017-07-25 | Resolved: 2021-08-11

## 2021-08-11 PROCEDURE — 1159F MED LIST DOCD IN RCRD: CPT | Performed by: INTERNAL MEDICINE

## 2021-08-11 PROCEDURE — 99397 PER PM REEVAL EST PAT 65+ YR: CPT | Performed by: INTERNAL MEDICINE

## 2021-08-11 PROCEDURE — 1170F FXNL STATUS ASSESSED: CPT | Performed by: INTERNAL MEDICINE

## 2021-08-11 PROCEDURE — 1126F AMNT PAIN NOTED NONE PRSNT: CPT | Performed by: INTERNAL MEDICINE

## 2021-08-11 PROCEDURE — 96160 PT-FOCUSED HLTH RISK ASSMT: CPT | Performed by: INTERNAL MEDICINE

## 2021-08-11 PROCEDURE — G0439 PPPS, SUBSEQ VISIT: HCPCS | Performed by: INTERNAL MEDICINE

## 2021-08-11 NOTE — PROGRESS NOTES
The ABCs of the Annual Wellness Visit  Subsequent Medicare Wellness Visit    Chief Complaint   Patient presents with   • Medicare Wellness-subsequent       Subjective   History of Present Illness:  Calos Lam is a 80 y.o. male who presents for a Subsequent Medicare Wellness Visit.    The following data was reviewed by: Karlie Hart MD on 2021:  Common labs    Common Labsle 21    1050 1050 1050 1050 1003 1003 1003 1003   Glucose  99    116 (A)     BUN  19    20     Creatinine  0.89    0.96     eGFR Non African Am  82    75     eGFR  Am  99    91     Sodium  142    141     Potassium  3.9    4.5     Chloride  102    105     Calcium  9.6    9.4     Total Protein  6.5    6.5     Albumin  4.10    4.10     Total Bilirubin  0.3    0.6     Alkaline Phosphatase  51    55     AST (SGOT)  17    17     ALT (SGPT)  13    14     WBC 8.36    7.67      Hemoglobin 15.7    16.1      Hematocrit 46.1    46.3      Platelets 232    231      Total Cholesterol   152    216 (A)    Triglycerides   112    107    HDL Cholesterol   54    59    LDL Cholesterol    78    138 (A)    Hemoglobin A1C    5.90 (A)    5.90 (A)   (A) Abnormal value       Comments are available for some flowsheets but are not being displayed.           TSH    TSH 21   TSH 0.929 0.909           Dm-2.  Control is good.  HTN.  Control is good.  HLD.  Fair control of LDL on pravastatin.          HEALTH RISK ASSESSMENT    Recent Hospitalizations:  No hospitalization(s) within the last year.    Current Medical Providers:  Patient Care Team:  Karlie Hart MD as PCP - General (Internal Medicine)  Hayden Santiago MD as Neurologist (Neurology)    Smoking Status:  Social History     Tobacco Use   Smoking Status Former Smoker   • Types: Cigarettes   • Quit date:    • Years since quittin.6   Smokeless Tobacco Never Used       Alcohol Consumption:  Social History     Substance and Sexual  Activity   Alcohol Use No       Depression Screen:   PHQ-2/PHQ-9 Depression Screening 8/11/2021   Little interest or pleasure in doing things 0   Feeling down, depressed, or hopeless 0   Trouble falling or staying asleep, or sleeping too much -   Feeling tired or having little energy -   Poor appetite or overeating -   Feeling bad about yourself - or that you are a failure or have let yourself or your family down -   Trouble concentrating on things, such as reading the newspaper or watching television -   Moving or speaking so slowly that other people could have noticed. Or the opposite - being so fidgety or restless that you have been moving around a lot more than usual -   Thoughts that you would be better off dead, or of hurting yourself in some way -   Total Score 0   If you checked off any problems, how difficult have these problems made it for you to do your work, take care of things at home, or get along with other people? -       Fall Risk Screen:  [place] Fall Risk Assessment was completed, and patient is at LOW risk for falls.Assessment completed on:8/11/2021    Health Habits and Functional and Cognitive Screening:  Functional & Cognitive Status 8/11/2021   Do you have difficulty preparing food and eating? No   Do you have difficulty bathing yourself, getting dressed or grooming yourself? No   Do you have difficulty using the toilet? No   Do you have difficulty moving around from place to place? No   Do you have trouble with steps or getting out of a bed or a chair? No   Current Diet Well Balanced Diet   Dental Exam Up to date   Eye Exam Up to date   Exercise (times per week) 0 times per week   Current Exercises Include No Regular Exercise   Current Exercise Activities Include -   Do you need help using the phone?  No   Are you deaf or do you have serious difficulty hearing?  No   Do you need help with transportation? No   Do you need help shopping? No   Do you need help preparing meals?  No   Do you need  help with housework?  No   Do you need help with laundry? No   Do you need help taking your medications? No   Do you need help managing money? No   Do you ever drive or ride in a car without wearing a seat belt? No   Have you felt unusual stress, anger or loneliness in the last month? No   Who do you live with? Spouse   If you need help, do you have trouble finding someone available to you? No   Have you been bothered in the last four weeks by sexual problems? No   Do you have difficulty concentrating, remembering or making decisions? No         Does the patient have evidence of cognitive impairment? Yes    Asprin use counseling:Does not need ASA but is currently taking (advised patient that ASA is not indicated and patient chooses to stop it)    Age-appropriate Screening Schedule:  Refer to the list below for future screening recommendations based on patient's age, sex and/or medical conditions. Orders for these recommended tests are listed in the plan section. The patient has been provided with a written plan.    Health Maintenance   Topic Date Due   • DIABETIC EYE EXAM  04/05/2018   • URINE MICROALBUMIN  03/18/2021   • TDAP/TD VACCINES (1 - Tdap) 05/05/2024 (Originally 5/5/2014)   • INFLUENZA VACCINE  10/01/2021   • HEMOGLOBIN A1C  02/05/2022   • LIPID PANEL  08/05/2022   • ZOSTER VACCINE  Discontinued          The following portions of the patient's history were reviewed and updated as appropriate: allergies, current medications, past family history, past medical history, past social history, past surgical history and problem list.    Outpatient Medications Prior to Visit   Medication Sig Dispense Refill   • aliskiren (TEKTURNA) 300 MG tablet Tekturna 300 mg tablet   take 1 tablet by mouth once daily     • amLODIPine (NORVASC) 10 MG tablet TAKE 1 TABLET EVERY DAY 90 tablet 1   • Bystolic 10 MG tablet TAKE 1 TABLET EVERY DAY 90 tablet 0   • Cyanocobalamin (VITAMIN B-12 PO) Take  by mouth.     •  losartan-hydrochlorothiazide (HYZAAR) 50-12.5 MG per tablet Take 1 tablet by mouth Daily. 90 tablet 1   • omeprazole (PRILOSEC) 20 MG capsule Prilosec 20 mg capsule,delayed release   Take 1 capsule every day by oral route.     • pramipexole (MIRAPEX) 0.25 MG tablet Take 2 tablets by mouth Every Night. 60 tablet 3   • pravastatin (PRAVACHOL) 40 MG tablet TAKE 1 TABLET EVERY DAY 90 tablet 0   • rivastigmine (EXELON) 13.3 MG/24HR patch PLACE 1 PATCH ON THE SKIN DAILY AS DIRECTED BY PROVIDER . 30 patch 11   • solifenacin (VESICARE) 10 MG tablet Vesicare 10 mg tablet   Take 1 tablet every day by oral route.     • tamsulosin (FLOMAX) 0.4 MG capsule 24 hr capsule Take 1 capsule by mouth Daily. 90 capsule 1   • ASPIRIN 81 PO Take  by mouth Daily.     •  MG tablet      • vitamin D (ERGOCALCIFEROL) 1.25 MG (78312 UT) capsule capsule TAKE 1 CAPSULE EVERY 7 DAYS 12 capsule 1     No facility-administered medications prior to visit.       Patient Active Problem List   Diagnosis   • Sleep-related hypoventilation/hypoxia    • Frontotemporal dementia (CMS/HCC)   • Essential hypertension   • Mixed hyperlipidemia   • Type 2 diabetes mellitus without complication (CMS/HCC)   • History of bladder cancer   • RLS (restless legs syndrome)   • PLMD (periodic limb movement disorder)   • Vitamin D deficiency   • Incomplete RBBB   • B12 deficiency       Advanced Care Planning:  ACP discussion was held with the patient during this visit. Patient has an advance directive (not in EMR), copy requested.    Review of Systems   Constitutional: Negative.    HENT: Negative.    Eyes: Negative.    Respiratory: Negative.    Cardiovascular: Negative.    Gastrointestinal: Negative.    Endocrine: Negative.    Genitourinary: Negative.    Musculoskeletal: Negative.    Skin: Negative.    Allergic/Immunologic: Negative.    Neurological: Negative.    Hematological: Negative.    Psychiatric/Behavioral: Negative.        Compared to one year ago, the patient  feels his physical health is the same.  Compared to one year ago, the patient feels his mental health is the same.    Reviewed chart for potential of high risk medication in the elderly: yes  Reviewed chart for potential of harmful drug interactions in the elderly:yes    Objective         Vitals:    08/11/21 1003   BP: 132/64   Pulse: 50   SpO2: 98%   Weight: 68.5 kg (151 lb)   PainSc: 0-No pain       Body mass index is 21.67 kg/m².  Discussed the patient's BMI with him. The BMI is in the acceptable range.    Physical Exam  Constitutional:       Appearance: He is well-developed.   HENT:      Head: Normocephalic and atraumatic.      Right Ear: Hearing normal. There is impacted cerumen.      Left Ear: Hearing normal. There is impacted cerumen.      Mouth/Throat:      Pharynx: No posterior oropharyngeal erythema.   Neck:      Thyroid: No thyromegaly.   Cardiovascular:      Rate and Rhythm: Normal rate and regular rhythm.      Heart sounds: Normal heart sounds. No murmur heard.     Pulmonary:      Effort: Pulmonary effort is normal.      Breath sounds: Normal breath sounds.   Abdominal:      General: There is no distension.      Palpations: Abdomen is soft.      Tenderness: There is no abdominal tenderness.   Musculoskeletal:      Cervical back: Neck supple.   Lymphadenopathy:      Cervical: No cervical adenopathy.   Skin:     General: Skin is warm and dry.   Neurological:      Mental Status: He is alert and oriented to person, place, and time.   Psychiatric:         Speech: Speech normal.         Behavior: Behavior normal.         Thought Content: Thought content normal.         Judgment: Judgment normal.         Lab Results   Component Value Date     (H) 08/05/2021    CHLPL 216 (H) 08/05/2021    TRIG 107 08/05/2021    HDL 59 08/05/2021     (H) 08/05/2021    VLDL 19 08/05/2021    HGBA1C 5.90 (H) 08/05/2021        Assessment/Plan   Medicare Risks and Personalized Health Plan  CMS Preventative Services  Quick Reference  Advance Directive Discussion    The above risks/problems have been discussed with the patient.  Pertinent information has been shared with the patient in the After Visit Summary.  Follow up plans and orders are seen below in the Assessment/Plan Section.    Diagnoses and all orders for this visit:    1. Medicare annual wellness visit, subsequent (Primary)    2. Well adult exam    3. Type 2 diabetes mellitus without complication, unspecified whether long term insulin use (CMS/[facility])    4. Essential hypertension    5. Mixed hyperlipidemia    6. Ceruminosis, bilateral    Continue healthy diet and exercise.  Stop aspirin.  He does not need.  Ear irrigation performed today.  He is encouraged to get yearly eye exams.      Follow Up:  Return in about 6 months (around 2/11/2022) for Recheck.     An After Visit Summary and PPPS were given to the patient.

## 2021-08-11 NOTE — PATIENT INSTRUCTIONS
Medicare Wellness  Personal Prevention Plan of Service     Date of Office Visit:  2021  Encounter Provider:  Karlie Hart MD  Place of Service:  [facility]  Patient Name: Calos Lam  :  1940    As part of the Medicare Wellness portion of your visit today, we are providing you with this personalized preventive plan of services (PPPS). This plan is based upon recommendations of the [place] Preventive Services Task Force (USPSTF) and the Advisory Committee on Immunization Practices (ACIP).    This lists the preventive care services that should be considered, and provides dates of when you are due. Items listed as completed are up-to-date and do not require any further intervention.    Health Maintenance   Topic Date Due   • DIABETIC EYE EXAM  2018   • URINE MICROALBUMIN  2021   • ANNUAL WELLNESS VISIT  2021   • TDAP/TD VACCINES (1 - Tdap) 2024 (Originally 2014)   • INFLUENZA VACCINE  10/01/2021   • HEMOGLOBIN A1C  2022   • LIPID PANEL  2022   • COLORECTAL CANCER SCREENING  2022   • COVID-19 Vaccine  Completed   • Pneumococcal Vaccine 65+  Completed   • ZOSTER VACCINE  Discontinued       No orders of the defined types were placed in this encounter.      No follow-ups on file.

## 2021-08-13 ENCOUNTER — APPOINTMENT (OUTPATIENT)
Dept: SPEECH THERAPY | Facility: HOSPITAL | Age: 81
End: 2021-08-13

## 2021-08-17 ENCOUNTER — HOSPITAL ENCOUNTER (OUTPATIENT)
Dept: SPEECH THERAPY | Facility: HOSPITAL | Age: 81
Setting detail: THERAPIES SERIES
Discharge: HOME | End: 2021-08-17

## 2021-08-17 DIAGNOSIS — R41.841 COGNITIVE COMMUNICATION DEFICIT: ICD-10-CM

## 2021-08-17 DIAGNOSIS — G31.09 FRONTOTEMPORAL DEMENTIA (HCC): Primary | ICD-10-CM

## 2021-08-17 DIAGNOSIS — F02.80 FRONTOTEMPORAL DEMENTIA (HCC): Primary | ICD-10-CM

## 2021-08-17 PROCEDURE — 92507 TX SP LANG VOICE COMM INDIV: CPT | Performed by: SPEECH-LANGUAGE PATHOLOGIST

## 2021-08-17 RX ORDER — PRAVASTATIN SODIUM 40 MG
TABLET ORAL
Qty: 90 TABLET | Refills: 0 | Status: SHIPPED | OUTPATIENT
Start: 2021-08-17 | End: 2021-11-29

## 2021-08-17 NOTE — THERAPY TREATMENT NOTE
Outpatient Speech Language Pathology   Adult Speech Language Cognitive Treatment Note  [facility]     Patient Name: Calos Lam  : 1940  MRN: [id number]  Today's Date: 2021         Visit Date: 2021   Patient Active Problem List   Diagnosis   • Sleep-related hypoventilation/hypoxia    • Frontotemporal dementia (CMS/HCC)   • Essential hypertension   • Mixed hyperlipidemia   • Type 2 diabetes mellitus without complication (CMS/HCC)   • History of bladder cancer   • RLS (restless legs syndrome)   • PLMD (periodic limb movement disorder)   • Vitamin D deficiency   • Incomplete RBBB   • B12 deficiency          Visit Dx:    ICD-10-CM ICD-9-CM   1. Frontotemporal dementia (CMS/HCC)  G31.09 331.19    F02.80    2. Cognitive communication deficit  R41.841 799.52         Patient was not wearing a face mask during this therapy encounter. Therapist used appropriate personal protective equipment including mask, eye protection and gloves.  Mask used was standard procedure mask. Appropriate PPE was worn during the entire therapy session. Hand hygiene was completed before and after therapy session. Patient is not in enhanced droplet precautions.                               SLP OP Goals     Row Name 21 1500          Goal Type Needed    Goal Type Needed  Other Adult Goals;Dysarthria  -KA        Subjective Comments    Subjective Comments  Patient is pleasant and cooperative. Patient has requested information and education on exercises to strengthen his voice and speech due to complain others have difficulty understanding him. Patient does not demonstrate dysarthria characteristics and is 100% intelligible in office. SLP provided education today on EMST and oral motor strengthening exercises.   -KA        Subjective Pain    Able to rate subjective pain?  yes  -KA     Pre-Treatment Pain Level  0  -KA     Post-Treatment Pain Level  0  -KA        Dysarthria Goals     Dysarthria LTG's  Patient will be able  "to engage in speech at the conversational level using appropriate prosody so that speech is understood by familiar/unfamiliar listeners  -KA     Patient will be able to engage in speech at the conversational level using appropriate prosody so that speech is understood by familiar/unfamiliar listeners  90%:;without cues  -KA     Status: Patient will be able to engage in speech at the conversational level using appropriate prosody so that speech is understood by familiar/unfamiliar listeners  New  -KA      Dysarthria STG's  Patient will increase strength and power  of articulators so they can move more quickly and accurately to improve speech intelligibility by  -KA     \"Patient will increase strength and power  of articulators so they can move more quickly and accurately to improve speech intelligibility by completing lip exercises\"  90%:;without cues  -KA     Status: Patient will increase strength and power  of articulators so they can move more quickly and accurately to improve speech intelligibility by completing lip exercises  New  -KA     Comments: Patient will increase strength and power  of articulators so they can move more quickly and accurately to improve speech intelligibility by completing lip exercises  SLP educated patient today on lingual and labial ROM, protrusion, lateralization and ROM exercises patient completed x1-2 repetitions each and overall demonstrates adequate and functional oral motor movement and function. SLP requesting exercises due to concern family members are having difficulty understanding him. Patient instructed to perform 1-2 times a day 5 repetitions each  -KA        Other Goals    Other Adult Goal- 1  Patient will increase expiratory strength by using expiratory muscle strength training and to increase voice output and volume 90% accuracy without cues   -KA     Status: Other Adult Goal- 1  New  -KA     Comments: Other Adult Goal- 1  Patient brought his EMST device today and SLP " trained patient how to use, EMST was set to [id number] patient completed 5 sets of 5 breaths independently and 100% adequately. patient to complete 5x a week  -KYLAH       User Key  (r) = Recorded By, (t) = Taken By, (c) = Cosigned By    Initials Name Provider Type    Elian Shields MA,CCC-SLP Speech and Language Pathologist                 Time Calculation:   SLP Start Time: 1515  SLP Stop Time: 1553  SLP Time Calculation (min): 38 min  Untimed Charges  68940-OP Treatment/ST Modification Prosth Aug Alter : 38  Total Minutes  Untimed Charges Total Minutes: 38   Total Minutes: 38    Therapy Charges for Today     Code Description Service Date Service Provider Modifiers Qty    76123089281  ST TREATMENT SPEECH 3 8/17/2021 Elian Peres MA,CCC-SLP GN 1                   Elian Peres MA,CCC-SLP  8/17/2021

## 2021-08-20 ENCOUNTER — APPOINTMENT (OUTPATIENT)
Dept: SPEECH THERAPY | Facility: HOSPITAL | Age: 81
End: 2021-08-20

## 2021-08-24 ENCOUNTER — APPOINTMENT (OUTPATIENT)
Dept: SPEECH THERAPY | Facility: HOSPITAL | Age: 81
End: 2021-08-24

## 2021-08-25 ENCOUNTER — HOSPITAL ENCOUNTER (OUTPATIENT)
Dept: SPEECH THERAPY | Facility: HOSPITAL | Age: 81
Setting detail: THERAPIES SERIES
Discharge: HOME | End: 2021-08-25

## 2021-08-25 DIAGNOSIS — G31.09 FRONTOTEMPORAL DEMENTIA (HCC): Primary | ICD-10-CM

## 2021-08-25 DIAGNOSIS — F02.80 FRONTOTEMPORAL DEMENTIA (HCC): Primary | ICD-10-CM

## 2021-08-25 DIAGNOSIS — R41.841 COGNITIVE COMMUNICATION DEFICIT: ICD-10-CM

## 2021-08-25 PROCEDURE — 92507 TX SP LANG VOICE COMM INDIV: CPT | Performed by: SPEECH-LANGUAGE PATHOLOGIST

## 2021-08-25 NOTE — THERAPY TREATMENT NOTE
Outpatient Speech Language Pathology   Adult Speech Language Cognitive Treatment Note  [facility]     Patient Name: Calos Lam  : 1940  MRN: [id number]  Today's Date: 2021         Visit Date: 2021   Patient Active Problem List   Diagnosis   • Sleep-related hypoventilation/hypoxia    • Frontotemporal dementia (CMS/HCC)   • Essential hypertension   • Mixed hyperlipidemia   • Type 2 diabetes mellitus without complication (CMS/HCC)   • History of bladder cancer   • RLS (restless legs syndrome)   • PLMD (periodic limb movement disorder)   • Vitamin D deficiency   • Incomplete RBBB   • B12 deficiency          Visit Dx:    ICD-10-CM ICD-9-CM   1. Frontotemporal dementia (CMS/HCC)  G31.09 331.19    F02.80    2. Cognitive communication deficit  R41.841 799.52                  Patient was wearing a face mask during this therapy encounter. Therapist used appropriate personal protective equipment including mask, eye protection and gloves.  Mask used was standard procedure mask. Appropriate PPE was worn during the entire therapy session. Hand hygiene was completed before and after therapy session. Patient is not in enhanced droplet precautions.                       SLP OP Goals     Row Name 21 1600          Subjective Comments    Subjective Comments  Patient is pleasant and cooperrative  -KA        Subjective Pain    Able to rate subjective pain?  yes  -KA     Pre-Treatment Pain Level  0  -KA     Post-Treatment Pain Level  0  -KA        Other Goals    Other Adult Goal- 1  Patient will increase expiratory strength by using expiratory muscle strength training and to increase voice output and volume 90% accuracy without cues   -KA     Status: Other Adult Goal- 1  Progressing as expected  -KA     Comments: Other Adult Goal- 1  Patient turned nob 1/4 of a turn to 21cgi60 and completd x5 sets of 5 breaths 90% accuracy min cues to inhale deeply and blow hard and fast. SLP edcuated patient today the  relationship of diaphramtic breathing and importance of deep inhalation prior to vocalization pt practiced deep inhalation prior to vocalization at the single word to sentence level while maintaining adequate loudness 100% accuracy    -KYLAH       User Key  (r) = Recorded By, (t) = Taken By, (c) = Cosigned By    Initials Name Provider Type    Elian Shields MA,CCC-SLP Speech and Language Pathologist                 Time Calculation:   SLP Start Time: 1515  SLP Stop Time: 1555  SLP Time Calculation (min): 40 min  Untimed Charges  16159-CN Treatment/ST Modification Prosth Aug Alter : 40  Total Minutes  Untimed Charges Total Minutes: 40   Total Minutes: 40    Therapy Charges for Today     Code Description Service Date Service Provider Modifiers Qty    25735678362  ST TREATMENT SPEECH 3 8/25/2021 Elian Peres MA,CCC-SLP GN 1                   Elian Peres MA,CCC-SLP  8/25/2021

## 2021-08-27 ENCOUNTER — APPOINTMENT (OUTPATIENT)
Dept: SPEECH THERAPY | Facility: HOSPITAL | Age: 81
End: 2021-08-27

## 2021-08-31 ENCOUNTER — APPOINTMENT (OUTPATIENT)
Dept: SPEECH THERAPY | Facility: HOSPITAL | Age: 81
End: 2021-08-31

## 2021-08-31 RX ORDER — NEBIVOLOL HYDROCHLORIDE 10 MG/1
TABLET ORAL
Qty: 90 TABLET | Refills: 1 | Status: SHIPPED | OUTPATIENT
Start: 2021-08-31 | End: 2022-05-13

## 2021-08-31 RX ORDER — TAMSULOSIN HYDROCHLORIDE 0.4 MG/1
CAPSULE ORAL
Qty: 90 CAPSULE | Refills: 1 | Status: SHIPPED | OUTPATIENT
Start: 2021-08-31 | End: 2022-04-08

## 2021-08-31 RX ORDER — LOSARTAN POTASSIUM AND HYDROCHLOROTHIAZIDE 12.5; 5 MG/1; MG/1
TABLET ORAL
Qty: 90 TABLET | Refills: 1 | Status: SHIPPED | OUTPATIENT
Start: 2021-08-31 | End: 2022-05-13

## 2021-09-01 ENCOUNTER — HOSPITAL ENCOUNTER (OUTPATIENT)
Dept: SPEECH THERAPY | Facility: HOSPITAL | Age: 81
Setting detail: THERAPIES SERIES
Discharge: HOME | End: 2021-09-01

## 2021-09-01 DIAGNOSIS — G31.09 FRONTOTEMPORAL DEMENTIA (HCC): Primary | ICD-10-CM

## 2021-09-01 DIAGNOSIS — F02.80 FRONTOTEMPORAL DEMENTIA (HCC): Primary | ICD-10-CM

## 2021-09-01 DIAGNOSIS — R41.841 COGNITIVE COMMUNICATION DEFICIT: ICD-10-CM

## 2021-09-01 PROCEDURE — 92507 TX SP LANG VOICE COMM INDIV: CPT | Performed by: SPEECH-LANGUAGE PATHOLOGIST

## 2021-09-01 NOTE — THERAPY TREATMENT NOTE
Outpatient Speech Language Pathology   Adult Speech Language Cognitive Treatment Note  [facility]     Patient Name: Calos Lam  : 1940  MRN: [id number]  Today's Date: 2021         Visit Date: 2021   Patient Active Problem List   Diagnosis   • Sleep-related hypoventilation/hypoxia    • Frontotemporal dementia (CMS/HCC)   • Essential hypertension   • Mixed hyperlipidemia   • Type 2 diabetes mellitus without complication (CMS/HCC)   • History of bladder cancer   • RLS (restless legs syndrome)   • PLMD (periodic limb movement disorder)   • Vitamin D deficiency   • Incomplete RBBB   • B12 deficiency          Visit Dx:    ICD-10-CM ICD-9-CM   1. Frontotemporal dementia (CMS/HCC)  G31.09 331.19    F02.80    2. Cognitive communication deficit  R41.841 799.52            Patient was not wearing a face mask during this therapy encounter. Therapist used appropriate personal protective equipment including mask, eye protection and gloves.  Mask used was standard procedure mask. Appropriate PPE was worn during the entire therapy session. Hand hygiene was completed before and after therapy session. Patient is not in enhanced droplet precautions.                             SLP OP Goals     Row Name 21 1600          Subjective Comments    Subjective Comments  Patient is pleasant and cooperative. (patient arrived on incorrect day, pt mixed up days and SLP able to see patient.  -KA        Subjective Pain    Able to rate subjective pain?  yes  -KA     Pre-Treatment Pain Level  0  -KA     Post-Treatment Pain Level  0  -KA        Dysarthria Goals     Dysarthria STG's  Patient will apply compensatory strategies to improve intelligibility of speech during in spontaneous speech  -KA     Patient will apply compensatory strategies to improve intelligibility of speech during in spontaneous speech  90%:;without cues  -KA     Status: Patient will apply compensatory strategies to improve intelligibility of speech  "during in spontaneous speech  New  -KA     Comments: Patient will apply compensatory strategies to improve intelligibility of speech during in spontaneous speech  Overarticulation practiced at the single word to sentence level with 100% SLP educated patient on situations or scenarios strategies may need to used more frequently (on the phone or in loud environment like restaurant)   -KA     \"Patient will increase strength and power  of articulators so they can move more quickly and accurately to improve speech intelligibility by completing lip exercises\"  90%:;without cues  -KA     Status: Patient will increase strength and power  of articulators so they can move more quickly and accurately to improve speech intelligibility by completing lip exercises  Progressing as expected  -KA     Comments: Patient will increase strength and power  of articulators so they can move more quickly and accurately to improve speech intelligibility by completing lip exercises  Patient completed labial and lingual ROM exercises x5 each, difficulty following directions for lingual retraction   -KA        Other Goals    Other Adult Goal- 1  Patient will increase expiratory strength by using expiratory muscle strength training and to increase voice output and volume 90% accuracy without cues   -KA     Status: Other Adult Goal- 1  Progressing as expected  -KA     Comments: Other Adult Goal- 1  Patient turned nob 1/4 of a turn to [id number] (last week [id number], pt reports he has not turned nob on his own at home)and completd x5 sets of 5 breaths 90% accuracy min cues to inhale deeply and blow hard and fast. SLP edcuated patient today the relationship of diaphramtic breathing and importance of deep inhalation prior to vocalization pt practiced deep inhalation prior to vocalization at the single word to sentence level while maintaining adequate loudness 100% accuracy    -KA       User Key  (r) = Recorded By, (t) = Taken By, (c) = Cosigned By    " Initials Name Provider Type    KA Elian Peres MA,CCC-SLP Speech and Language Pathologist                 Time Calculation:   SLP Start Time: 1520  SLP Stop Time: 1558  SLP Time Calculation (min): 38 min  Untimed Charges  39074-QN Treatment/ST Modification Prosth Aug Alter : 38  Total Minutes  Untimed Charges Total Minutes: 38   Total Minutes: 38    Therapy Charges for Today     Code Description Service Date Service Provider Modifiers Qty    39758598027 HC ST TREATMENT SPEECH 3 9/1/2021 Elian Peres MA,CCC-SLP GN 1                   Elian Peres MA,CCC-SLP  9/1/2021

## 2021-09-03 ENCOUNTER — APPOINTMENT (OUTPATIENT)
Dept: SPEECH THERAPY | Facility: HOSPITAL | Age: 81
End: 2021-09-03

## 2021-09-07 ENCOUNTER — HOSPITAL ENCOUNTER (OUTPATIENT)
Dept: SPEECH THERAPY | Facility: HOSPITAL | Age: 81
Setting detail: THERAPIES SERIES
Discharge: HOME | End: 2021-09-07

## 2021-09-07 DIAGNOSIS — G31.09 FRONTOTEMPORAL DEMENTIA (HCC): Primary | ICD-10-CM

## 2021-09-07 DIAGNOSIS — R41.841 COGNITIVE COMMUNICATION DEFICIT: ICD-10-CM

## 2021-09-07 DIAGNOSIS — F02.80 FRONTOTEMPORAL DEMENTIA (HCC): Primary | ICD-10-CM

## 2021-09-07 PROCEDURE — 92507 TX SP LANG VOICE COMM INDIV: CPT | Performed by: SPEECH-LANGUAGE PATHOLOGIST

## 2021-09-07 NOTE — THERAPY TREATMENT NOTE
Outpatient Speech Language Pathology   Adult Speech Language Cognitive Treatment Note  [facility]     Patient Name: Calos Lam  : 1940  MRN: [id number]  Today's Date: 2021         Visit Date: 2021   Patient Active Problem List   Diagnosis   • Sleep-related hypoventilation/hypoxia    • Frontotemporal dementia (CMS/HCC)   • Essential hypertension   • Mixed hyperlipidemia   • Type 2 diabetes mellitus without complication (CMS/HCC)   • History of bladder cancer   • RLS (restless legs syndrome)   • PLMD (periodic limb movement disorder)   • Vitamin D deficiency   • Incomplete RBBB   • B12 deficiency          Visit Dx:    ICD-10-CM ICD-9-CM   1. Frontotemporal dementia (CMS/HCC)  G31.09 331.19    F02.80    2. Cognitive communication deficit  R41.841 799.52        Patient was not wearing a face mask during this therapy encounter. Therapist used appropriate personal protective equipment including mask, eye protection and gloves.  Mask used was standard procedure mask. Appropriate PPE was worn during the entire therapy session. Hand hygiene was completed before and after therapy session. Patient is not in enhanced droplet precautions.                                 SLP OP Goals     Row Name 21 1600          Subjective Comments    Subjective Comments  Patient is pleasant and cooperative  -KA        Subjective Pain    Able to rate subjective pain?  yes  -KA     Pre-Treatment Pain Level  0  -KA     Post-Treatment Pain Level  0  -KA        Dysarthria Goals    Patient will apply compensatory strategies to improve intelligibility of speech during in spontaneous speech  90%:;without cues  -KA     Status: Patient will apply compensatory strategies to improve intelligibility of speech during in spontaneous speech  Progressing as expected  -KA     Comments: Patient will apply compensatory strategies to improve intelligibility of speech during in spontaneous speech  Overarticulation and inhalation  "prior to vocalization practiced at the multisentence to spontaneous conversation levelwith 100% SLP educated patient on situations or scenarios strategies may need to used more frequently (on the phone or in loud environment like restaurant) . Patient reports his wife and son have noticied improvements and feel pt is speaking more loudly and clearly.   -KA     \"Patient will increase strength and power  of articulators so they can move more quickly and accurately to improve speech intelligibility by completing lip exercises\"  90%:;without cues  -KA     Status: Patient will increase strength and power  of articulators so they can move more quickly and accurately to improve speech intelligibility by completing lip exercises  Progressing as expected  -KA     Comments: Patient will increase strength and power  of articulators so they can move more quickly and accurately to improve speech intelligibility by completing lip exercises  Patient completed labial and lingual ROM exercises x5 each, difficulty following directions for lingual retraction   -KA        Other Goals    Other Adult Goal- 1  Patient will increase expiratory strength by using expiratory muscle strength training and to increase voice output and volume 90% accuracy without cues   -KA     Status: Other Adult Goal- 1  Progressing as expected  -KA     Comments: Other Adult Goal- 1  Patient turned nob 1/4 of a turn to [id number] (last week [id number], and completd x5 sets of 5 breaths 90% accuracy min cues to inhale deeply and blow hard and fast. SLP edcuated patient today the relationship of diaphramtic breathing and importance of deep inhalation prior to vocalization pt practiced deep inhalation prior to vocalization at the single word to sentence level while maintaining adequate loudness 100% accuracy    -KA       User Key  (r) = Recorded By, (t) = Taken By, (c) = Cosigned By    Initials Name Provider Type    Elian Shields MA,[facility]-SLP Speech and Language " Pathologist                 Time Calculation:   SLP Start Time: 1515  SLP Stop Time: 1553  SLP Time Calculation (min): 38 min  Untimed Charges  14631-AN Treatment/ST Modification Prosth Aug Alter : 38  Total Minutes  Untimed Charges Total Minutes: 38   Total Minutes: 38    Therapy Charges for Today     Code Description Service Date Service Provider Modifiers Qty    06793021685 HC ST TREATMENT SPEECH 3 9/7/2021 Elian Peres MA,CCC-SLP GN 1                   Elian Peres MA,CCC-SLP  9/7/2021

## 2021-09-10 ENCOUNTER — APPOINTMENT (OUTPATIENT)
Dept: SPEECH THERAPY | Facility: HOSPITAL | Age: 81
End: 2021-09-10

## 2021-09-14 ENCOUNTER — HOSPITAL ENCOUNTER (OUTPATIENT)
Dept: SPEECH THERAPY | Facility: HOSPITAL | Age: 81
Setting detail: THERAPIES SERIES
Discharge: HOME | End: 2021-09-14

## 2021-09-14 DIAGNOSIS — G31.09 FRONTOTEMPORAL DEMENTIA (HCC): Primary | ICD-10-CM

## 2021-09-14 DIAGNOSIS — R41.841 COGNITIVE COMMUNICATION DEFICIT: ICD-10-CM

## 2021-09-14 DIAGNOSIS — F02.80 FRONTOTEMPORAL DEMENTIA (HCC): Primary | ICD-10-CM

## 2021-09-14 PROCEDURE — 92507 TX SP LANG VOICE COMM INDIV: CPT | Performed by: SPEECH-LANGUAGE PATHOLOGIST

## 2021-09-14 NOTE — THERAPY TREATMENT NOTE
Outpatient Speech Language Pathology   Adult Speech Language Cognitive Treatment Note  [facility]     Patient Name: Calos Lam  : 1940  MRN: [id number]  Today's Date: 2021         Visit Date: 2021   Patient Active Problem List   Diagnosis   • Sleep-related hypoventilation/hypoxia    • Frontotemporal dementia (CMS/HCC)   • Essential hypertension   • Mixed hyperlipidemia   • Type 2 diabetes mellitus without complication (CMS/HCC)   • History of bladder cancer   • RLS (restless legs syndrome)   • PLMD (periodic limb movement disorder)   • Vitamin D deficiency   • Incomplete RBBB   • B12 deficiency          Visit Dx:    ICD-10-CM ICD-9-CM   1. Frontotemporal dementia (CMS/HCC)  G31.09 331.19    F02.80    2. Cognitive communication deficit  R41.841 799.52        Patient was not wearing a face mask during this therapy encounter. Therapist used appropriate personal protective equipment including mask, eye protection and gloves.  Mask used was standard procedure mask. Appropriate PPE was worn during the entire therapy session. Hand hygiene was completed before and after therapy session. Patient is not in enhanced droplet precautions.                                 SLP OP Goals     Row Name 21 1500          Subjective Comments    Subjective Comments  Patient is pleasant and cooperative  -KA        Subjective Pain    Able to rate subjective pain?  yes  -KA     Pre-Treatment Pain Level  0  -KA     Post-Treatment Pain Level  0  -KA        Dysarthria Goals    Patient will apply compensatory strategies to improve intelligibility of speech during in spontaneous speech  90%:;without cues  -KA     Status: Patient will apply compensatory strategies to improve intelligibility of speech during in spontaneous speech  Progressing as expected  -KA     Comments: Patient will apply compensatory strategies to improve intelligibility of speech during in spontaneous speech  Overarticulation and inhalation  prior to vocalization practiced at the multisentence to spontaneous conversation levelwith 100% SLP educated patient on situations or scenarios strategies may need to used more frequently (on the phone or in loud environment like restaurant) . Patient reports his wife and son have noticied improvements and feel pt is speaking more loudly and clearly.   -KA        Other Goals    Other Adult Goal- 1  Patient will increase expiratory strength by using expiratory muscle strength training and to increase voice output and volume 90% accuracy without cues   -KA     Status: Other Adult Goal- 1  Progressing as expected  -KA     Comments: Other Adult Goal- 1  Patient turned nob 1/4 of a turn to [id number]  and completd x5 sets of 5 breaths 90% accuracy min cues to inhaledeeply and blow hard and fast. Patient is entering week 5 today and planned d/c next week. SLP continued edcuation today the relationship of diaphramtic breathing and importance of deep inhalation prior to vocalization pt practiced deep inhalation prior to vocalization at the single word to sentence level while maintaining adequate loudness 100% accuracy    -KA       User Key  (r) = Recorded By, (t) = Taken By, (c) = Cosigned By    Initials Name Provider Type    Elian Shields MA,CCC-SLP Speech and Language Pathologist                 Time Calculation:   SLP Start Time: 1415  SLP Stop Time: 1455  SLP Time Calculation (min): 40 min  Untimed Charges  57368-OL Treatment/ST Modification Prosth Aug Alter : 40  Total Minutes  Untimed Charges Total Minutes: 40   Total Minutes: 40    Therapy Charges for Today     Code Description Service Date Service Provider Modifiers Qty    70228160861  ST TREATMENT SPEECH 3 9/14/2021 Elian Peres MA,CCC-SLP GN 1                   Elian Peres MA,CCC-SLP  9/14/2021

## 2021-09-17 ENCOUNTER — APPOINTMENT (OUTPATIENT)
Dept: SPEECH THERAPY | Facility: HOSPITAL | Age: 81
End: 2021-09-17

## 2021-09-21 ENCOUNTER — HOSPITAL ENCOUNTER (OUTPATIENT)
Dept: SPEECH THERAPY | Facility: HOSPITAL | Age: 81
Setting detail: THERAPIES SERIES
Discharge: HOME | End: 2021-09-21

## 2021-09-21 ENCOUNTER — APPOINTMENT (OUTPATIENT)
Dept: SPEECH THERAPY | Facility: HOSPITAL | Age: 81
End: 2021-09-21

## 2021-09-21 DIAGNOSIS — R41.841 COGNITIVE COMMUNICATION DEFICIT: ICD-10-CM

## 2021-09-21 DIAGNOSIS — F02.80 FRONTOTEMPORAL DEMENTIA (HCC): Primary | ICD-10-CM

## 2021-09-21 DIAGNOSIS — G31.09 FRONTOTEMPORAL DEMENTIA (HCC): Primary | ICD-10-CM

## 2021-09-21 PROCEDURE — 92507 TX SP LANG VOICE COMM INDIV: CPT | Performed by: SPEECH-LANGUAGE PATHOLOGIST

## 2021-09-21 NOTE — THERAPY DISCHARGE NOTE
Outpatient Speech Language Pathology   Adult Speech Language Cognitive Treatment Note/Discharge Summary  [facility]     Patient Name: Calos Lam  : 1940  MRN: [id number]  Today's Date: 2021         Visit Date: 2021   Patient Active Problem List   Diagnosis   • Sleep-related hypoventilation/hypoxia    • Frontotemporal dementia (CMS/HCC)   • Essential hypertension   • Mixed hyperlipidemia   • Type 2 diabetes mellitus without complication (CMS/HCC)   • History of bladder cancer   • RLS (restless legs syndrome)   • PLMD (periodic limb movement disorder)   • Vitamin D deficiency   • Incomplete RBBB   • B12 deficiency          Visit Dx:    ICD-10-CM ICD-9-CM   1. Frontotemporal dementia (CMS/HCC)  G31.09 331.19    F02.80    2. Cognitive communication deficit  R41.841 799.52         Patient was wearing a face mask during this therapy encounter. Therapist used appropriate personal protective equipment including mask, eye protection and gloves.  Mask used was standard procedure mask. Appropriate PPE was worn during the entire therapy session. Hand hygiene was completed before and after therapy session. Patient is not in enhanced droplet precautions.                             SLP OP Goals     Row Name 21 1600          Subjective Comments    Subjective Comments  Patient is pleasant and cooperative.   -KA        Subjective Pain    Able to rate subjective pain?  yes  -KA     Pre-Treatment Pain Level  0  -KA     Post-Treatment Pain Level  0  -KA        Verbal Expression Goals    Status: Patient will be able to use verbal expressive language skills to communicate effectively in social/avocational/work setting  Discontinued  -KA     Comments: Patient will be able to use verbal expressive language skills to communicate effectively in social/avocational/work setting  Patient did not want to target verbal expression goals overall patient demonstrated functional word finding skills at the  "conversation level.   -KA     Status: Patient will improve verbal expressive language skills by completing divergent naming tasks  Discontinued  -KA     Status: Patient will improve verbal expressive language skills by describing attributes, function, action and/or uses of an object/item  Discontinued  -KA     Status: Patient will improve verbal expressive language skills by utilizing self-cueing strategies when encountering difficulty with expressive language  Discontinued  -KA        Dysarthria Goals    Status: Patient will be able to engage in speech at the conversational level using appropriate prosody so that speech is understood by familiar/unfamiliar listeners  Achieved  -KA     Patient will apply compensatory strategies to improve intelligibility of speech during in spontaneous speech  90%:;without cues  -KA     Status: Patient will apply compensatory strategies to improve intelligibility of speech during in spontaneous speech  Achieved  -KA     Comments: Patient will apply compensatory strategies to improve intelligibility of speech during in spontaneous speech  Overarticulation and inhalation prior to vocalization practiced at the multisentence to spontaneous conversation levelwith 100% SLP educated patient on situations or scenarios strategies may need to used more frequently (on the phone or in loud environment like restaurant) . Patient reports his wife and son have noticied improvements and feel pt is speaking more loudly and clearly.   -KA     \"Patient will increase strength and power  of articulators so they can move more quickly and accurately to improve speech intelligibility by completing lip exercises\"  90%:;without cues  -KA     Status: Patient will increase strength and power  of articulators so they can move more quickly and accurately to improve speech intelligibility by completing lip exercises  Achieved  -KA     Comments: Patient will increase strength and power  of articulators so they can " move more quickly and accurately to improve speech intelligibility by completing lip exercises  Patient completed labial and lingual ROM exercises x5 each, difficulty following directions for lingual retraction   -KA        Memory Goals    Status: Patient and family will implement compensatory strategies to maximize patient’s Memory function so patient can continue to participate in daily activities  Discontinued  -KA     Comments: Patient and family will implement compensatory strategies to maximize patient’s Memory function so patient can continue to participate in daily activities  patient did not want to target memory see information below and in previous charts   -KA     Status: Patient’s memory skills will be enhanced as reported by patient by using external memory aides  Discontinued  -KA        Other Goals    Other Adult Goal- 1  Patient will increase expiratory strength by using expiratory muscle strength training and to increase voice output and volume 90% accuracy without cues   -KA     Status: Other Adult Goal- 1  Achieved  -KA     Comments: Other Adult Goal- 1  Trialed EMST today (maintence period) at [id number] patient had dificulty breaking threshold and blowing hard and fast and returned to [id number] (maintenance program) 1/4 of a turn to 34mgr25  and completd x5 sets of 5 breaths 90% accuracy min cues to inhaledeeply and blow hard and fast. Patient is entering week 5 today and planned d/c next week. SLP continued edcuation today the relationship of diaphramtic breathing and importance of deep inhalation prior to vocalization pt practiced deep inhalation prior to vocalization at the single word to sentence level while maintaining adequate loudness 100% accuracy    -KA       User Key  (r) = Recorded By, (t) = Taken By, (c) = Cosigned By    Initials Name Provider Type    Elian Shields MA,CCC-SLP Speech and Language Pathologist                       Time Calculation:   SLP Start Time: 1515  SLP Stop  Time: 1600  SLP Time Calculation (min): 45 min  Untimed Charges  54866-BX Treatment/ST Modification Prosth Aug Alter : 45  Total Minutes  Untimed Charges Total Minutes: 45   Total Minutes: 45    Therapy Charges for Today     Code Description Service Date Service Provider Modifiers Qty    84042697990  ST TREATMENT SPEECH 3 9/21/2021 Elian Peres MA,CCC-SLP GN 1                 OP SLP Discharge Summary  Date of Discharge: 09/21/21  Reason for Discharge: all goals and outcomes met, no further needs identified  Progress Toward Achieving Short/long Term Goals: all goals met within established timelines  Discharge Destination: home w/ assist  Discharge Instructions: Patient is to continue EMST maintenance program 3x a week, pitch glide exercises, and oral motor exercises      Elian Peres MA,CCC-SLP  9/21/2021

## 2021-09-24 ENCOUNTER — APPOINTMENT (OUTPATIENT)
Dept: SPEECH THERAPY | Facility: HOSPITAL | Age: 81
End: 2021-09-24

## 2021-09-28 ENCOUNTER — APPOINTMENT (OUTPATIENT)
Dept: SPEECH THERAPY | Facility: HOSPITAL | Age: 81
End: 2021-09-28

## 2021-10-01 ENCOUNTER — APPOINTMENT (OUTPATIENT)
Dept: SPEECH THERAPY | Facility: HOSPITAL | Age: 81
End: 2021-10-01

## 2021-10-05 ENCOUNTER — APPOINTMENT (OUTPATIENT)
Dept: SPEECH THERAPY | Facility: HOSPITAL | Age: 81
End: 2021-10-05

## 2021-10-08 ENCOUNTER — APPOINTMENT (OUTPATIENT)
Dept: SPEECH THERAPY | Facility: HOSPITAL | Age: 81
End: 2021-10-08

## 2021-10-12 ENCOUNTER — APPOINTMENT (OUTPATIENT)
Dept: SPEECH THERAPY | Facility: HOSPITAL | Age: 81
End: 2021-10-12

## 2021-11-19 ENCOUNTER — FLU SHOT (OUTPATIENT)
Dept: INTERNAL MEDICINE | Facility: CLINIC | Age: 81
End: 2021-11-19

## 2021-11-19 DIAGNOSIS — Z23 NEED FOR IMMUNIZATION AGAINST INFLUENZA: Primary | ICD-10-CM

## 2021-11-19 PROCEDURE — G0008 ADMIN INFLUENZA VIRUS VAC: HCPCS | Performed by: INTERNAL MEDICINE

## 2021-11-19 PROCEDURE — 90662 IIV NO PRSV INCREASED AG IM: CPT | Performed by: INTERNAL MEDICINE

## 2021-11-29 RX ORDER — PRAVASTATIN SODIUM 40 MG
TABLET ORAL
Qty: 90 TABLET | Refills: 0 | Status: SHIPPED | OUTPATIENT
Start: 2021-11-29 | End: 2022-01-21

## 2021-12-03 ENCOUNTER — OFFICE VISIT (OUTPATIENT)
Dept: INTERNAL MEDICINE | Facility: CLINIC | Age: 81
End: 2021-12-03

## 2021-12-03 VITALS
OXYGEN SATURATION: 96 % | SYSTOLIC BLOOD PRESSURE: 154 MMHG | BODY MASS INDEX: 21.76 KG/M2 | WEIGHT: 152 LBS | DIASTOLIC BLOOD PRESSURE: 84 MMHG | HEART RATE: 53 BPM | HEIGHT: 70 IN

## 2021-12-03 DIAGNOSIS — Z91.89 DRIVING SAFETY ISSUE: Primary | ICD-10-CM

## 2021-12-03 PROCEDURE — 99212 OFFICE O/P EST SF 10 MIN: CPT | Performed by: INTERNAL MEDICINE

## 2021-12-03 NOTE — PROGRESS NOTES
Subjective     Calos Lam is a 80 y.o. male who presents with   Chief Complaint   Patient presents with   • driving evalaution       History of Present Illness     [facility].  They continue to recommend no interstate driving and no nighttime driving.  Reviewed recent optometrist visit.  He uses glasses to drive.    Review of Systems   Respiratory: Negative.    Cardiovascular: Negative.        The following portions of the patient's history were reviewed and updated as appropriate: allergies, current medications and problem list.    Patient Active Problem List    Diagnosis Date Noted   • B12 deficiency 09/11/2019     Note Last Updated: 9/16/2019     IF is normal.  Recommend oral supplement.       • Vitamin D deficiency 03/07/2019   • Incomplete RBBB 03/07/2019   • PLMD (periodic limb movement disorder) 02/10/2018   • History of bladder cancer 02/18/2017   • RLS (restless legs syndrome) 02/18/2017   • Type 2 diabetes mellitus without complication (HCC) 02/15/2017   • Essential hypertension    • Mixed hyperlipidemia    • Frontotemporal dementia (HCC) 01/18/2017   • Sleep-related hypoventilation/hypoxia  12/11/2016       Current Outpatient Medications on File Prior to Visit   Medication Sig Dispense Refill   • aliskiren (TEKTURNA) 300 MG tablet Tekturna 300 mg tablet   take 1 tablet by mouth once daily     • amLODIPine (NORVASC) 10 MG tablet TAKE 1 TABLET EVERY DAY 90 tablet 1   • Bystolic 10 MG tablet TAKE 1 TABLET EVERY DAY 90 tablet 1   • Cyanocobalamin (VITAMIN B-12 PO) Take  by mouth.     • losartan-hydrochlorothiazide (HYZAAR) 50-12.5 MG per tablet TAKE 1 TABLET EVERY DAY 90 tablet 1   • omeprazole (PRILOSEC) 20 MG capsule Prilosec 20 mg capsule,delayed release   Take 1 capsule every day by oral route.     • pramipexole (MIRAPEX) 0.25 MG tablet Take 2 tablets by mouth Every Night. 60 tablet 3   • pravastatin (PRAVACHOL) 40 MG tablet TAKE 1 TABLET EVERY DAY 90 tablet 0  "  • rivastigmine (EXELON) 13.3 MG/24HR patch PLACE 1 PATCH ON THE SKIN DAILY AS DIRECTED BY PROVIDER . 30 patch 11   • solifenacin (VESICARE) 10 MG tablet Vesicare 10 mg tablet   Take 1 tablet every day by oral route.     • tamsulosin (FLOMAX) 0.4 MG capsule 24 hr capsule TAKE 1 CAPSULE EVERY DAY 90 capsule 1     No current facility-administered medications on file prior to visit.       Objective     /84   Pulse 53   Ht 177.8 cm (70\")   Wt 68.9 kg (152 lb)   SpO2 96%   BMI 21.81 kg/m²     Physical Exam  Constitutional:       Appearance: He is well-developed.   HENT:      Head: Atraumatic.      Right Ear: Hearing and tympanic membrane normal.      Left Ear: Hearing and tympanic membrane normal.      Mouth/Throat:      Pharynx: No oropharyngeal exudate or posterior oropharyngeal erythema.   Cardiovascular:      Rate and Rhythm: Normal rate and regular rhythm.      Heart sounds: Normal heart sounds.   Pulmonary:      Effort: Pulmonary effort is normal.      Breath sounds: Normal breath sounds.   Abdominal:      General: Bowel sounds are normal.      Palpations: Abdomen is soft. There is no mass.      Tenderness: There is no abdominal tenderness. There is no guarding or rebound.   Skin:     General: Skin is warm and dry.   Neurological:      Mental Status: He is alert and oriented to person, place, and time.         Assessment/Plan   Diagnoses and all orders for this visit:    1. Driving safety issue (Primary)        Discussion    Paperwork is completed to reflect his driving limitation.  No night driving.  No interstate driving.  Per formal assessment.  I spent 15 minutes caring for Calos on this date of service. This time includes time spent by me in the following activities: preparing for the visit, reviewing tests, documenting information in the medical record and care coordination.          Future Appointments   Date Time Provider Department Center   2/11/2022 10:40 AM [facility] " [facility]   2/18/2022  2:45 PM Karlie Hart MD [facility]   5/10/2022  2:00 PM Presley Kyle APRN [facility]

## 2021-12-23 RX ORDER — LANOLIN ALCOHOL/MO/W.PET/CERES
1000 CREAM (GRAM) TOPICAL DAILY
Qty: 90 TABLET | Refills: 2 | Status: SHIPPED | OUTPATIENT
Start: 2021-12-23 | End: 2022-07-25

## 2022-01-21 RX ORDER — PRAVASTATIN SODIUM 40 MG
TABLET ORAL
Qty: 90 TABLET | Refills: 0 | Status: SHIPPED | OUTPATIENT
Start: 2022-01-21 | End: 2022-06-06

## 2022-02-09 DIAGNOSIS — E78.2 MIXED HYPERLIPIDEMIA: ICD-10-CM

## 2022-02-09 DIAGNOSIS — E11.9 TYPE 2 DIABETES MELLITUS WITHOUT COMPLICATION, UNSPECIFIED WHETHER LONG TERM INSULIN USE: Primary | ICD-10-CM

## 2022-02-09 DIAGNOSIS — I10 ESSENTIAL HYPERTENSION: ICD-10-CM

## 2022-02-10 LAB
ALBUMIN SERPL-MCNC: 4.3 G/DL (ref 3.6–4.6)
ALBUMIN/GLOB SERPL: 1.9 {RATIO} (ref 1.2–2.2)
ALP SERPL-CCNC: 55 IU/L (ref 44–121)
ALT SERPL-CCNC: 12 IU/L (ref 0–44)
AST SERPL-CCNC: 16 IU/L (ref 0–40)
BASOPHILS # BLD AUTO: 0 X10E3/UL (ref 0–0.2)
BASOPHILS NFR BLD AUTO: 0 %
BILIRUB SERPL-MCNC: 0.6 MG/DL (ref 0–1.2)
BUN SERPL-MCNC: 24 MG/DL (ref 8–27)
BUN/CREAT SERPL: 25 (ref 10–24)
CALCIUM SERPL-MCNC: 9.4 MG/DL (ref 8.6–10.2)
CHLORIDE SERPL-SCNC: 101 MMOL/L (ref 96–106)
CHOLEST SERPL-MCNC: 175 MG/DL (ref 100–199)
CO2 SERPL-SCNC: 26 MMOL/L (ref 20–29)
CREAT SERPL-MCNC: 0.97 MG/DL (ref 0.76–1.27)
EOSINOPHIL # BLD AUTO: 0.2 X10E3/UL (ref 0–0.4)
EOSINOPHIL NFR BLD AUTO: 3 %
ERYTHROCYTE [DISTWIDTH] IN BLOOD BY AUTOMATED COUNT: 12.9 % (ref 11.6–15.4)
GLOBULIN SER CALC-MCNC: 2.3 G/DL (ref 1.5–4.5)
GLUCOSE SERPL-MCNC: 113 MG/DL (ref 65–99)
HBA1C MFR BLD: 6.3 % (ref 4.8–5.6)
HCT VFR BLD AUTO: 45.4 % (ref 37.5–51)
HDLC SERPL-MCNC: 57 MG/DL
HGB BLD-MCNC: 15.7 G/DL (ref 13–17.7)
IMM GRANULOCYTES # BLD AUTO: 0 X10E3/UL (ref 0–0.1)
IMM GRANULOCYTES NFR BLD AUTO: 0 %
LDLC SERPL CALC-MCNC: 100 MG/DL (ref 0–99)
LYMPHOCYTES # BLD AUTO: 1.6 X10E3/UL (ref 0.7–3.1)
LYMPHOCYTES NFR BLD AUTO: 21 %
MCH RBC QN AUTO: 31.3 PG (ref 26.6–33)
MCHC RBC AUTO-ENTMCNC: 34.6 G/DL (ref 31.5–35.7)
MCV RBC AUTO: 90 FL (ref 79–97)
MONOCYTES # BLD AUTO: 0.6 X10E3/UL (ref 0.1–0.9)
MONOCYTES NFR BLD AUTO: 8 %
NEUTROPHILS # BLD AUTO: 5.1 X10E3/UL (ref 1.4–7)
NEUTROPHILS NFR BLD AUTO: 68 %
PLATELET # BLD AUTO: 236 X10E3/UL (ref 150–450)
POTASSIUM SERPL-SCNC: 4.3 MMOL/L (ref 3.5–5.2)
PROT SERPL-MCNC: 6.6 G/DL (ref 6–8.5)
RBC # BLD AUTO: 5.02 X10E6/UL (ref 4.14–5.8)
SODIUM SERPL-SCNC: 141 MMOL/L (ref 134–144)
TRIGL SERPL-MCNC: 101 MG/DL (ref 0–149)
VLDLC SERPL CALC-MCNC: 18 MG/DL (ref 5–40)
WBC # BLD AUTO: 7.6 X10E3/UL (ref 3.4–10.8)

## 2022-02-18 ENCOUNTER — OFFICE VISIT (OUTPATIENT)
Dept: INTERNAL MEDICINE | Facility: CLINIC | Age: 82
End: 2022-02-18

## 2022-02-18 VITALS
SYSTOLIC BLOOD PRESSURE: 138 MMHG | HEART RATE: 54 BPM | WEIGHT: 153 LBS | HEIGHT: 70 IN | DIASTOLIC BLOOD PRESSURE: 70 MMHG | BODY MASS INDEX: 21.9 KG/M2 | OXYGEN SATURATION: 98 %

## 2022-02-18 DIAGNOSIS — E78.2 MIXED HYPERLIPIDEMIA: ICD-10-CM

## 2022-02-18 DIAGNOSIS — E11.9 TYPE 2 DIABETES MELLITUS WITHOUT COMPLICATION, UNSPECIFIED WHETHER LONG TERM INSULIN USE: Primary | ICD-10-CM

## 2022-02-18 DIAGNOSIS — I10 ESSENTIAL HYPERTENSION: ICD-10-CM

## 2022-02-18 PROCEDURE — 99214 OFFICE O/P EST MOD 30 MIN: CPT | Performed by: INTERNAL MEDICINE

## 2022-02-18 NOTE — PROGRESS NOTES
Subjective     Calos Lam is a 81 y.o. male who presents with   Chief Complaint   Patient presents with   • Diabetes   • Hypertension   • Hyperlipidemia       History of Present Illness     The following data was reviewed by: Karlie Hart MD on 02/18/2022:  Common labs    Common Labsle 8/5/21 8/5/21 8/5/21 8/5/21 2/9/22 2/9/22 2/9/22 2/9/22    1003 1003 1003 1003 1107 1107 1107 1107   Glucose  116 (A)     113 (A)    BUN  20     24    Creatinine  0.96     0.97    eGFR Non African Am  75     72    eGFR  Am  91     83    Sodium  141     141    Potassium  4.5     4.3    Chloride  105     101    Calcium  9.4     9.4    Total Protein  6.5     6.6    Albumin  4.10     4.3    Total Bilirubin  0.6     0.6    Alkaline Phosphatase  55     55    AST (SGOT)  17     16    ALT (SGPT)  14     12    WBC 7.67    7.6      Hemoglobin 16.1    15.7      Hematocrit 46.3    45.4      Platelets 231    236      Total Cholesterol   216 (A)   175     Triglycerides   107   101     HDL Cholesterol   59   57     LDL Cholesterol    138 (A)   100 (A)     Hemoglobin A1C    5.90 (A)    6.3 (A)   (A) Abnormal value       Comments are available for some flowsheets but are not being displayed.            Dm-2.  A1c is good control.   HTN.  Control is good.  HLD.  Good LDL control.      Review of Systems   Respiratory: Negative.    Cardiovascular: Negative.        The following portions of the patient's history were reviewed and updated as appropriate: allergies, current medications and problem list.    Patient Active Problem List    Diagnosis Date Noted   • B12 deficiency 09/11/2019     Note Last Updated: 9/16/2019     IF is normal.  Recommend oral supplement.       • Vitamin D deficiency 03/07/2019   • Incomplete RBBB 03/07/2019   • PLMD (periodic limb movement disorder) 02/10/2018   • History of bladder cancer 02/18/2017   • RLS (restless legs syndrome) 02/18/2017   • Type 2 diabetes mellitus without complication (HCC) 02/15/2017   •  "Essential hypertension    • Mixed hyperlipidemia    • Frontotemporal dementia (HCC) 01/18/2017   • Sleep-related hypoventilation/hypoxia  12/11/2016       Current Outpatient Medications on File Prior to Visit   Medication Sig Dispense Refill   • aliskiren (TEKTURNA) 300 MG tablet Tekturna 300 mg tablet   take 1 tablet by mouth once daily     • amLODIPine (NORVASC) 10 MG tablet TAKE 1 TABLET EVERY DAY 90 tablet 1   • Bystolic 10 MG tablet TAKE 1 TABLET EVERY DAY 90 tablet 1   • losartan-hydrochlorothiazide (HYZAAR) 50-12.5 MG per tablet TAKE 1 TABLET EVERY DAY 90 tablet 1   • omeprazole (PRILOSEC) 20 MG capsule Prilosec 20 mg capsule,delayed release   Take 1 capsule every day by oral route.     • pramipexole (MIRAPEX) 0.25 MG tablet Take 2 tablets by mouth Every Night. 60 tablet 3   • pravastatin (PRAVACHOL) 40 MG tablet TAKE 1 TABLET EVERY DAY 90 tablet 0   • rivastigmine (EXELON) 13.3 MG/24HR patch PLACE 1 PATCH ON THE SKIN DAILY AS DIRECTED BY PROVIDER . 30 patch 11   • solifenacin (VESICARE) 10 MG tablet Vesicare 10 mg tablet   Take 1 tablet every day by oral route.     • tamsulosin (FLOMAX) 0.4 MG capsule 24 hr capsule TAKE 1 CAPSULE EVERY DAY 90 capsule 1   • vitamin B-12 (CYANOCOBALAMIN) 1000 MCG tablet Take 1 tablet by mouth Daily. 90 tablet 2     No current facility-administered medications on file prior to visit.       Objective     /70   Pulse 54   Ht 177.8 cm (70\")   Wt 69.4 kg (153 lb)   SpO2 98%   BMI 21.95 kg/m²     Physical Exam  Constitutional:       Appearance: He is well-developed.   HENT:      Head: Atraumatic.   Cardiovascular:      Rate and Rhythm: Normal rate and regular rhythm.      Heart sounds: Normal heart sounds.   Pulmonary:      Effort: Pulmonary effort is normal.      Breath sounds: Normal breath sounds.   Skin:     General: Skin is warm and dry.   Neurological:      Mental Status: He is alert and oriented to person, place, and time.         Assessment/Plan   Diagnoses and " all orders for this visit:    1. Type 2 diabetes mellitus without complication, unspecified whether long term insulin use (HCC) (Primary)    2. Essential hypertension    3. Mixed hyperlipidemia        Discussion    Dm-2.  Continue healthy diet.   HTN.  Control is good.  The patient is advised to continue current dosage of four drug regimen.  HLD. Control is good.  The patient is advised to continue current dosage of pravastatin.             Future Appointments   Date Time Provider Department Center   5/10/2022  2:00 PM Presley Kyle APRN [facility]   8/22/2022  9:30 AM [facility]   8/29/2022  1:30 PM Karlie Hart MD [facility]

## 2022-04-08 RX ORDER — TAMSULOSIN HYDROCHLORIDE 0.4 MG/1
CAPSULE ORAL
Qty: 90 CAPSULE | Refills: 1 | Status: SHIPPED | OUTPATIENT
Start: 2022-04-08 | End: 2022-10-25

## 2022-05-10 ENCOUNTER — OFFICE VISIT (OUTPATIENT)
Dept: NEUROLOGY | Facility: CLINIC | Age: 82
End: 2022-05-10

## 2022-05-10 VITALS
DIASTOLIC BLOOD PRESSURE: 66 MMHG | HEIGHT: 70 IN | HEART RATE: 59 BPM | OXYGEN SATURATION: 95 % | WEIGHT: 157 LBS | BODY MASS INDEX: 22.48 KG/M2 | SYSTOLIC BLOOD PRESSURE: 148 MMHG

## 2022-05-10 DIAGNOSIS — G31.09 FRONTOTEMPORAL DEMENTIA: Primary | ICD-10-CM

## 2022-05-10 DIAGNOSIS — F02.80 FRONTOTEMPORAL DEMENTIA: Primary | ICD-10-CM

## 2022-05-10 DIAGNOSIS — G25.81 RLS (RESTLESS LEGS SYNDROME): ICD-10-CM

## 2022-05-10 PROCEDURE — 99213 OFFICE O/P EST LOW 20 MIN: CPT | Performed by: NURSE PRACTITIONER

## 2022-05-10 NOTE — PROGRESS NOTES
Subjective:     Patient ID: Calos Lam is a 81 y.o. male presenting for follow up for frontotemporal dementia. He was last seen by Dr. Lozoya on 9/17/20. I saw the patient via video visit 5/4/21.      She notes he is doing very well and denies much decline if at all since his last visit. He continues to remain very active both physically and mentally. He does continue to have trouble with some behavioral changes such as passing gas in public, leaving doors open, not finishing tasks, but overall is doing well. The patient does continue to drive but has limitations regarding not driving at night and only driving short distances to places he often goes to. He drives to the library quite a bit. He walks a few miles at least 3 days a week.      He continues Exelon patch 13.3 mg per 24 hours. He discussed Namenda with Dr. Lozoya at his last visit and it was not felt that it was needed at this time. He is on vitamin B12 supplementation as well as vitamin D.      His sleep apnea is treated with a mouthpiece. He was unable to tolerate CPAP. His restless legs are treated with pramipexole.      He previously was a heavy drinker. He and his wife state he drank several bourbons per night. He no longer drinks.     History of Present Illness  The following portions of the patient's history were reviewed and updated as appropriate: allergies, current medications, past family history, past medical history, past social history, past surgical history and problem list.    Review of Systems   Cardiovascular: Negative for chest pain, palpitations and leg swelling.   Gastrointestinal: Negative for diarrhea, nausea and vomiting.   Endocrine: Negative for cold intolerance, heat intolerance and polydipsia.   Genitourinary: Negative for decreased urine volume, difficulty urinating and urgency.   Musculoskeletal: Negative for back pain, gait problem, neck pain and neck stiffness.   Skin: Negative for color change, rash and wound.    Allergic/Immunologic: Negative for environmental allergies, food allergies and immunocompromised state.   Neurological: Negative for dizziness, tremors, seizures, syncope, facial asymmetry, speech difficulty, weakness, light-headedness, numbness and headaches.   Hematological: Negative for adenopathy. Does not bruise/bleed easily.   Psychiatric/Behavioral: Negative for confusion, decreased concentration and sleep disturbance. The patient is not nervous/anxious.       I have reviewed and confirmed the accuracy of the patient's history: Chief complaint, HPI, ROS, Subjective and Past Family Social History as entered by the MA.. Presley Prasadenther, APRN 05/24/22       Objective:    Full exam completed today as documented. No changes since his last visit.     Neurologic Exam  Mental Status: Awake, alert, oriented x3.   HCF: No aphasia or dysarthria.  Knowledge of recent events intact.    CN:      I:              II: Visual fields full without left inattention              III, IV, VI: Eye movements intact without nystagmus or ptosis.  Pupils equal  round and reactive to light.              V,VII: Light touch and pinprick intact all 3 divisions of V.  Facial muscles symmetrical.              VIII: Hearing intact to finger rub              IX,X: Soft palate elevates symmetrically              XI: Sternomastoid and trapezius are strong.              XII: Tongue midline without atrophy or fasciculations  Motor: Normal tone and bulk in the upper and lower extremities              Power testing: Full power in all muscles tested arms and legs  Reflexes: Upper extremities: Upper extremities trace                   Lower extremities: Knee jerks +1                   Toe signs: Downgoing  Sensory: Light touch: Diffusely intact  Cerebellar: Finger-to-nose: Mild postural tremor                      Rapid movement: Intact                      Heel-to-shin: Normal  Gait and Station: Normal    Physical Exam  General: 80-year-old male  in no acute distress.  HEENT: Normocephalic, no evidence of trauma.   Neck: Supple.   Heart: RRR  Extremities: No pedal edema.      Assessment/Plan:     Diagnoses and all orders for this visit:    1. Frontotemporal dementia (HCC) (Primary)    2. RLS (restless legs syndrome)       1. Frontotemporal dementia: He is doing very well. I do not notice any decline compared to previous visits and his wife agrees with this. Continues to function at a very high level. We discussed the importance of continued physical activity as well as cognitive activity. He exercises at least 3 days a week and reads quite a bit. Continues to go to the library. Is able to drive short distances.   2. Restless legs syndrome and sleep apnea are both treated and well controlled at this time.

## 2022-05-12 DIAGNOSIS — I10 ESSENTIAL HYPERTENSION: ICD-10-CM

## 2022-05-13 RX ORDER — AMLODIPINE BESYLATE 10 MG/1
TABLET ORAL
Qty: 90 TABLET | Refills: 1 | Status: SHIPPED | OUTPATIENT
Start: 2022-05-13

## 2022-05-13 RX ORDER — LOSARTAN POTASSIUM AND HYDROCHLOROTHIAZIDE 12.5; 5 MG/1; MG/1
TABLET ORAL
Qty: 90 TABLET | Refills: 1 | Status: SHIPPED | OUTPATIENT
Start: 2022-05-13

## 2022-05-13 RX ORDER — NEBIVOLOL HYDROCHLORIDE 10 MG/1
TABLET ORAL
Qty: 90 TABLET | Refills: 1 | Status: SHIPPED | OUTPATIENT
Start: 2022-05-13 | End: 2023-01-02 | Stop reason: SDUPTHER

## 2022-06-01 ENCOUNTER — OFFICE VISIT (OUTPATIENT)
Dept: INTERNAL MEDICINE | Facility: CLINIC | Age: 82
End: 2022-06-01

## 2022-06-01 VITALS
OXYGEN SATURATION: 98 % | BODY MASS INDEX: 21.76 KG/M2 | HEIGHT: 70 IN | DIASTOLIC BLOOD PRESSURE: 68 MMHG | WEIGHT: 152 LBS | SYSTOLIC BLOOD PRESSURE: 126 MMHG | HEART RATE: 60 BPM

## 2022-06-01 DIAGNOSIS — R10.32 LEFT GROIN PAIN: Primary | ICD-10-CM

## 2022-06-01 DIAGNOSIS — M25.552 LEFT HIP PAIN: ICD-10-CM

## 2022-06-01 PROCEDURE — 73502 X-RAY EXAM HIP UNI 2-3 VIEWS: CPT | Performed by: INTERNAL MEDICINE

## 2022-06-01 PROCEDURE — 99213 OFFICE O/P EST LOW 20 MIN: CPT | Performed by: INTERNAL MEDICINE

## 2022-06-01 NOTE — PROGRESS NOTES
Subjective     Calos Lam is a 81 y.o. male who presents with   Chief Complaint   Patient presents with   • Groin Pain       History of Present Illness     C/o pain in groin.  More painful as the day goes on.  Going on a week.  No injury.  H/o hernia repair on that side.     Review of Systems   Gastrointestinal: Negative for abdominal pain.       The following portions of the patient's history were reviewed and updated as appropriate: allergies, current medications and problem list.    Patient Active Problem List    Diagnosis Date Noted   • B12 deficiency 09/11/2019     Note Last Updated: 9/16/2019     IF is normal.  Recommend oral supplement.       • Vitamin D deficiency 03/07/2019   • Incomplete RBBB 03/07/2019   • PLMD (periodic limb movement disorder) 02/10/2018   • History of bladder cancer 02/18/2017   • RLS (restless legs syndrome) 02/18/2017   • Type 2 diabetes mellitus without complication (HCC) 02/15/2017   • Essential hypertension    • Mixed hyperlipidemia    • Frontotemporal dementia (HCC) 01/18/2017   • Sleep-related hypoventilation/hypoxia  12/11/2016       Current Outpatient Medications on File Prior to Visit   Medication Sig Dispense Refill   • aliskiren (TEKTURNA) 300 MG tablet Tekturna 300 mg tablet   take 1 tablet by mouth once daily     • amLODIPine (NORVASC) 10 MG tablet TAKE 1 TABLET EVERY DAY 90 tablet 1   • Bystolic 10 MG tablet TAKE 1 TABLET EVERY DAY 90 tablet 1   • losartan-hydrochlorothiazide (HYZAAR) 50-12.5 MG per tablet TAKE 1 TABLET EVERY DAY 90 tablet 1   • omeprazole (priLOSEC) 20 MG capsule Prilosec 20 mg capsule,delayed release   Take 1 capsule every day by oral route.     • pramipexole (MIRAPEX) 0.25 MG tablet Take 2 tablets by mouth Every Night. 60 tablet 3   • pravastatin (PRAVACHOL) 40 MG tablet TAKE 1 TABLET EVERY DAY 90 tablet 0   • rivastigmine (EXELON) 13.3 MG/24HR patch PLACE 1 PATCH ON THE SKIN DAILY AS DIRECTED BY PROVIDER . 30 patch 11   • solifenacin (VESICARE)  "10 MG tablet Vesicare 10 mg tablet   Take 1 tablet every day by oral route.     • tamsulosin (FLOMAX) 0.4 MG capsule 24 hr capsule TAKE 1 CAPSULE EVERY DAY 90 capsule 1   • vitamin B-12 (CYANOCOBALAMIN) 1000 MCG tablet Take 1 tablet by mouth Daily. 90 tablet 2     No current facility-administered medications on file prior to visit.       Objective     /68   Pulse 60   Ht 177.8 cm (70\")   Wt 68.9 kg (152 lb)   SpO2 98%   BMI 21.81 kg/m²     Physical Exam  Constitutional:       Appearance: He is well-developed.   HENT:      Head: Atraumatic.   Pulmonary:      Effort: Pulmonary effort is normal.   Abdominal:      Hernia: There is no hernia in the left inguinal area.   Musculoskeletal:      Left hip: No tenderness, bony tenderness or crepitus. Normal range of motion.   Neurological:      Mental Status: He is alert and oriented to person, place, and time.     X-rays of the hip  performed today for following indication:   pain.  X-ray reveal nad.  There is no available x-ray for comparison.  X-ray sent to radiology for official interpretation and findings.        Assessment & Plan   Diagnoses and all orders for this visit:    1. Left groin pain (Primary)  -     Ambulatory Referral to General Surgery    2. Left hip pain  -     XR Hip With or Without Pelvis 2 - 3 View Left        Discussion    Patient presents with persistent left groin pain on side with previous inguinal hernia repair.  No evidence of primary hip pathology.  I am unable to palpate a hernia in the office today.  Refer to general surgery for exam and assessment.         Future Appointments   Date Time Provider Department Center   8/22/2022  9:30 AM [facility]   8/29/2022  1:30 PM Karlie Hart MD [facility]   5/11/2023  1:00 PM Presley Kyle APRN [facility]         "

## 2022-06-06 RX ORDER — ERGOCALCIFEROL 1.25 MG/1
CAPSULE ORAL
Qty: 12 CAPSULE | Refills: 1 | Status: SHIPPED | OUTPATIENT
Start: 2022-06-06 | End: 2023-01-24

## 2022-06-06 RX ORDER — PRAVASTATIN SODIUM 40 MG
TABLET ORAL
Qty: 90 TABLET | Refills: 0 | Status: SHIPPED | OUTPATIENT
Start: 2022-06-06 | End: 2022-12-21

## 2022-07-15 RX ORDER — RIVASTIGMINE 13.3 MG/24H
PATCH, EXTENDED RELEASE TRANSDERMAL
Qty: 90 PATCH | Refills: 0 | Status: SHIPPED | OUTPATIENT
Start: 2022-07-15 | End: 2023-02-20

## 2022-07-25 RX ORDER — LANOLIN ALCOHOL/MO/W.PET/CERES
CREAM (GRAM) TOPICAL
Qty: 90 TABLET | Refills: 2 | Status: SHIPPED | OUTPATIENT
Start: 2022-07-25

## 2022-08-22 DIAGNOSIS — E11.9 TYPE 2 DIABETES MELLITUS WITHOUT COMPLICATION, UNSPECIFIED WHETHER LONG TERM INSULIN USE: ICD-10-CM

## 2022-08-22 DIAGNOSIS — I10 ESSENTIAL HYPERTENSION: Primary | ICD-10-CM

## 2022-08-22 DIAGNOSIS — E78.2 MIXED HYPERLIPIDEMIA: ICD-10-CM

## 2022-08-23 LAB
ALBUMIN SERPL-MCNC: 4.1 G/DL (ref 3.6–4.6)
ALBUMIN/GLOB SERPL: 1.8 {RATIO} (ref 1.2–2.2)
ALP SERPL-CCNC: 53 IU/L (ref 44–121)
ALT SERPL-CCNC: 11 IU/L (ref 0–44)
AST SERPL-CCNC: 12 IU/L (ref 0–40)
BASOPHILS # BLD AUTO: 0 X10E3/UL (ref 0–0.2)
BASOPHILS NFR BLD AUTO: 0 %
BILIRUB SERPL-MCNC: 0.5 MG/DL (ref 0–1.2)
BUN SERPL-MCNC: 24 MG/DL (ref 8–27)
BUN/CREAT SERPL: 23 (ref 10–24)
CALCIUM SERPL-MCNC: 9.5 MG/DL (ref 8.6–10.2)
CHLORIDE SERPL-SCNC: 99 MMOL/L (ref 96–106)
CHOLEST SERPL-MCNC: 173 MG/DL (ref 100–199)
CO2 SERPL-SCNC: 29 MMOL/L (ref 20–29)
CREAT SERPL-MCNC: 1.05 MG/DL (ref 0.76–1.27)
EGFRCR-CYS SERPLBLD CKD-EPI 2021: 70 ML/MIN/1.73
EOSINOPHIL # BLD AUTO: 0.2 X10E3/UL (ref 0–0.4)
EOSINOPHIL NFR BLD AUTO: 3 %
ERYTHROCYTE [DISTWIDTH] IN BLOOD BY AUTOMATED COUNT: 12.5 % (ref 11.6–15.4)
GLOBULIN SER CALC-MCNC: 2.3 G/DL (ref 1.5–4.5)
GLUCOSE SERPL-MCNC: 161 MG/DL (ref 65–99)
HBA1C MFR BLD: 6 % (ref 4.8–5.6)
HCT VFR BLD AUTO: 47.8 % (ref 37.5–51)
HDLC SERPL-MCNC: 60 MG/DL
HGB BLD-MCNC: 15.7 G/DL (ref 13–17.7)
IMM GRANULOCYTES # BLD AUTO: 0 X10E3/UL (ref 0–0.1)
IMM GRANULOCYTES NFR BLD AUTO: 0 %
LDLC SERPL CALC-MCNC: 98 MG/DL (ref 0–99)
LYMPHOCYTES # BLD AUTO: 1.7 X10E3/UL (ref 0.7–3.1)
LYMPHOCYTES NFR BLD AUTO: 26 %
MCH RBC QN AUTO: 30.6 PG (ref 26.6–33)
MCHC RBC AUTO-ENTMCNC: 32.8 G/DL (ref 31.5–35.7)
MCV RBC AUTO: 93 FL (ref 79–97)
MONOCYTES # BLD AUTO: 0.6 X10E3/UL (ref 0.1–0.9)
MONOCYTES NFR BLD AUTO: 9 %
NEUTROPHILS # BLD AUTO: 4.2 X10E3/UL (ref 1.4–7)
NEUTROPHILS NFR BLD AUTO: 62 %
PLATELET # BLD AUTO: 250 X10E3/UL (ref 150–450)
POTASSIUM SERPL-SCNC: 3.8 MMOL/L (ref 3.5–5.2)
PROT SERPL-MCNC: 6.4 G/DL (ref 6–8.5)
RBC # BLD AUTO: 5.13 X10E6/UL (ref 4.14–5.8)
SODIUM SERPL-SCNC: 141 MMOL/L (ref 134–144)
TRIGL SERPL-MCNC: 80 MG/DL (ref 0–149)
VLDLC SERPL CALC-MCNC: 15 MG/DL (ref 5–40)
WBC # BLD AUTO: 6.8 X10E3/UL (ref 3.4–10.8)

## 2022-08-29 ENCOUNTER — OFFICE VISIT (OUTPATIENT)
Dept: INTERNAL MEDICINE | Facility: CLINIC | Age: 82
End: 2022-08-29

## 2022-08-29 VITALS
OXYGEN SATURATION: 97 % | HEIGHT: 70 IN | BODY MASS INDEX: 21.05 KG/M2 | HEART RATE: 60 BPM | WEIGHT: 147 LBS | SYSTOLIC BLOOD PRESSURE: 120 MMHG | DIASTOLIC BLOOD PRESSURE: 60 MMHG

## 2022-08-29 DIAGNOSIS — G31.09 FRONTOTEMPORAL DEMENTIA: ICD-10-CM

## 2022-08-29 DIAGNOSIS — Z00.00 WELL ADULT EXAM: ICD-10-CM

## 2022-08-29 DIAGNOSIS — Z00.00 MEDICARE ANNUAL WELLNESS VISIT, SUBSEQUENT: Primary | ICD-10-CM

## 2022-08-29 DIAGNOSIS — F02.80 FRONTOTEMPORAL DEMENTIA: ICD-10-CM

## 2022-08-29 DIAGNOSIS — E11.9 TYPE 2 DIABETES MELLITUS WITHOUT COMPLICATION, UNSPECIFIED WHETHER LONG TERM INSULIN USE: ICD-10-CM

## 2022-08-29 PROCEDURE — 99397 PER PM REEVAL EST PAT 65+ YR: CPT | Performed by: INTERNAL MEDICINE

## 2022-08-29 PROCEDURE — 1170F FXNL STATUS ASSESSED: CPT | Performed by: INTERNAL MEDICINE

## 2022-08-29 PROCEDURE — 1126F AMNT PAIN NOTED NONE PRSNT: CPT | Performed by: INTERNAL MEDICINE

## 2022-08-29 PROCEDURE — 96160 PT-FOCUSED HLTH RISK ASSMT: CPT | Performed by: INTERNAL MEDICINE

## 2022-08-29 PROCEDURE — 1159F MED LIST DOCD IN RCRD: CPT | Performed by: INTERNAL MEDICINE

## 2022-08-29 PROCEDURE — G0439 PPPS, SUBSEQ VISIT: HCPCS | Performed by: INTERNAL MEDICINE

## 2022-08-29 NOTE — PATIENT INSTRUCTIONS
Medicare Wellness  Personal Prevention Plan of Service     Date of Office Visit:    Encounter Provider:  Karlie Hart MD  Place of Service:  [facility]  Patient Name: Calos Lam  :  1940    As part of the Medicare Wellness portion of your visit today, we are providing you with this personalized preventive plan of services (PPPS). This plan is based upon recommendations of the [place] Preventive Services Task Force (USPSTF) and the Advisory Committee on Immunization Practices (ACIP).    This lists the preventive care services that should be considered, and provides dates of when you are due. Items listed as completed are up-to-date and do not require any further intervention.    Health Maintenance   Topic Date Due    URINE MICROALBUMIN  2021    ANNUAL WELLNESS VISIT  2022    TDAP/TD VACCINES (1 - Tdap) 2024 (Originally 2014)    INFLUENZA VACCINE  10/01/2022    HEMOGLOBIN A1C  2023    DIABETIC EYE EXAM  2023    LIPID PANEL  2023    COVID-19 Vaccine  Completed    Pneumococcal Vaccine 65+  Completed    ZOSTER VACCINE  Discontinued    COLORECTAL CANCER SCREENING  Discontinued       No orders of the defined types were placed in this encounter.      No follow-ups on file.

## 2022-08-29 NOTE — PROGRESS NOTES
The ABCs of the Annual Wellness Visit  Subsequent Medicare Wellness Visit    Chief Complaint   Patient presents with   • Medicare Wellness-subsequent   • Annual Exam      Subjective    History of Present Illness:  Calos Lam is a 81 y.o. male who presents for a Subsequent Medicare Wellness Visit.    The following data was reviewed by: Karlie Hart MD on 08/29/2022:  Common labs    Common Labsle 2/9/22 2/9/22 2/9/22 2/9/22 8/22/22 8/22/22 8/22/22 8/22/22    1107 1107 1107 1107 0932 0932 0932 0932   Glucose   113 (A)   161 (A)     BUN   24   24     Creatinine   0.97   1.05     eGFR Non  Am   72        eGFR African Am   83        Sodium   141   141     Potassium   4.3   3.8     Chloride   101   99     Calcium   9.4   9.5     Total Protein   6.6   6.4     Albumin   4.3   4.1     Total Bilirubin   0.6   0.5     Alkaline Phosphatase   55   53     AST (SGOT)   16   12     ALT (SGPT)   12   11     WBC 7.6    6.8      Hemoglobin 15.7    15.7      Hematocrit 45.4    47.8      Platelets 236    250      Total Cholesterol  175     173    Triglycerides  101     80    HDL Cholesterol  57     60    LDL Cholesterol   100 (A)     98    Hemoglobin A1C    6.3 (A)    6.0 (A)   (A) Abnormal value       Comments are available for some flowsheets but are not being displayed.           Dm-2.  Control is good.  HTN.  Control is good.   FTD.  Followed by neurology.  He is stable.      The following portions of the patient's history were reviewed and   updated as appropriate: allergies, current medications, past family history, past medical history, past social history, past surgical history and problem list.    Compared to one year ago, the patient feels his physical   health is the same.    Compared to one year ago, the patient feels his mental   health is the same.    Recent Hospitalizations:  He was not admitted to the hospital during the last year.       Current Medical Providers:  Patient Care Team:  Karlie Hart MD  as PCP - General (Internal Medicine)  Hayden Santiago MD as Neurologist (Neurology)    Outpatient Medications Prior to Visit   Medication Sig Dispense Refill   • aliskiren (TEKTURNA) 300 MG tablet Tekturna 300 mg tablet   take 1 tablet by mouth once daily     • amLODIPine (NORVASC) 10 MG tablet TAKE 1 TABLET EVERY DAY 90 tablet 1   • Bystolic 10 MG tablet TAKE 1 TABLET EVERY DAY 90 tablet 1   • losartan-hydrochlorothiazide (HYZAAR) 50-12.5 MG per tablet TAKE 1 TABLET EVERY DAY 90 tablet 1   • omeprazole (priLOSEC) 20 MG capsule Prilosec 20 mg capsule,delayed release   Take 1 capsule every day by oral route.     • pramipexole (MIRAPEX) 0.25 MG tablet Take 2 tablets by mouth Every Night. 60 tablet 3   • pravastatin (PRAVACHOL) 40 MG tablet TAKE 1 TABLET EVERY DAY 90 tablet 0   • rivastigmine (EXELON) 13.3 MG/24HR patch PLACE 1 PATCH ON THE SKIN DAILY AS DIRECTED BY PROVIDER . 90 patch 0   • solifenacin (VESICARE) 10 MG tablet Vesicare 10 mg tablet   Take 1 tablet every day by oral route.     • tamsulosin (FLOMAX) 0.4 MG capsule 24 hr capsule TAKE 1 CAPSULE EVERY DAY 90 capsule 1   • vitamin B-12 (CYANOCOBALAMIN) 1000 MCG tablet TAKE 1 TABLET EVERY DAY 90 tablet 2   • vitamin D (ERGOCALCIFEROL) 1.25 MG (26663 UT) capsule capsule TAKE 1 CAPSULE EVERY 7 DAYS 12 capsule 1     No facility-administered medications prior to visit.       No opioid medication identified on active medication list. I have reviewed chart for other potential  high risk medication/s and harmful drug interactions in the elderly.          Aspirin is not on active medication list.  Aspirin use is not indicated based on review of current medical condition/s. Risk of harm outweighs potential benefits.  .    Patient Active Problem List   Diagnosis   • Sleep-related hypoventilation/hypoxia    • Frontotemporal dementia (HCC)   • Essential hypertension   • Mixed hyperlipidemia   • Type 2 diabetes mellitus without complication (HCC)   • History of bladder  "cancer   • RLS (restless legs syndrome)   • PLMD (periodic limb movement disorder)   • Vitamin D deficiency   • Incomplete RBBB   • B12 deficiency     Advance Care Planning  Advance Directive is not on file.  ACP discussion was held with the patient during this visit. Patient has an advance directive (not in EMR), copy requested.          Objective    Vitals:    22 1325   BP: 120/60   Pulse: 60   SpO2: 97%   Weight: 66.7 kg (147 lb)   Height: 177.8 cm (70\")   PainSc: 0-No pain     Estimated body mass index is 21.09 kg/m² as calculated from the following:    Height as of this encounter: 177.8 cm (70\").    Weight as of this encounter: 66.7 kg (147 lb).    BMI is within normal parameters. No other follow-up for BMI required.      Does the patient have evidence of cognitive impairment? No    Physical Exam  Lab Results   Component Value Date    CHLPL 173 2022    TRIG 80 2022    HDL 60 2022    LDL 98 2022    VLDL 15 2022    HGBA1C 6.0 (H) 2022            HEALTH RISK ASSESSMENT    Smoking Status:  Social History     Tobacco Use   Smoking Status Former Smoker   • Types: Cigarettes   • Quit date:    • Years since quittin.6   Smokeless Tobacco Never Used     Alcohol Consumption:  Social History     Substance and Sexual Activity   Alcohol Use No     Fall Risk Screen:    NORAADI Fall Risk Assessment was completed, and patient is at LOW risk for falls.Assessment completed on:2022    Depression Screening:  PHQ-2/PHQ-9 Depression Screening 2022   Retired PHQ-9 Total Score -   Retired Total Score -   Little Interest or Pleasure in Doing Things 0-->not at all   Feeling Down, Depressed or Hopeless 0-->not at all   PHQ-9: Brief Depression Severity Measure Score 0       Health Habits and Functional and Cognitive Screening:  Functional & Cognitive Status 2022   Do you have difficulty preparing food and eating? No   Do you have difficulty bathing yourself, getting dressed " or grooming yourself? No   Do you have difficulty using the toilet? No   Do you have difficulty moving around from place to place? No   Do you have trouble with steps or getting out of a bed or a chair? No   Current Diet Well Balanced Diet   Dental Exam Up to date   Eye Exam Up to date   Exercise (times per week) 3 times per week   Current Exercises Include Walking;Light Weights   Current Exercise Activities Include -   Do you need help using the phone?  No   Are you deaf or do you have serious difficulty hearing?  No   Do you need help with transportation? No   Do you need help shopping? No   Do you need help preparing meals?  No   Do you need help with housework?  No   Do you need help with laundry? No   Do you need help taking your medications? No   Do you need help managing money? No   Do you ever drive or ride in a car without wearing a seat belt? No   Have you felt unusual stress, anger or loneliness in the last month? No   Who do you live with? Spouse   If you need help, do you have trouble finding someone available to you? No   Have you been bothered in the last four weeks by sexual problems? No   Do you have difficulty concentrating, remembering or making decisions? No       Age-appropriate Screening Schedule:  Refer to the list below for future screening recommendations based on patient's age, sex and/or medical conditions. Orders for these recommended tests are listed in the plan section. The patient has been provided with a written plan.    Health Maintenance   Topic Date Due   • URINE MICROALBUMIN  03/18/2021   • TDAP/TD VACCINES (1 - Tdap) 05/05/2024 (Originally 5/5/2014)   • INFLUENZA VACCINE  10/01/2022   • HEMOGLOBIN A1C  02/22/2023   • DIABETIC EYE EXAM  07/19/2023   • LIPID PANEL  08/22/2023   • ZOSTER VACCINE  Discontinued              Assessment & Plan   CMS Preventative Services Quick Reference  Risk Factors Identified During Encounter  Dementia/Memory   The above risks/problems have been  discussed with the patient.  Follow up actions/plans if indicated are seen below in the Assessment/Plan Section.  Pertinent information has been shared with the patient in the After Visit Summary.    Diagnoses and all orders for this visit:    1. Medicare annual wellness visit, subsequent (Primary)    2. Well adult exam    3. Type 2 diabetes mellitus without complication, unspecified whether long term insulin use (HCC)    4. Frontotemporal dementia (HCC)    DM-2.  Control is good.  Continue healthy diet.   FTD.  Control is stable.  The patient is advised to continue current dosage of Exelon patches.          Follow Up:   Return in about 6 months (around 2/28/2023).     An After Visit Summary and PPPS were made available to the patient.

## 2022-10-25 RX ORDER — TAMSULOSIN HYDROCHLORIDE 0.4 MG/1
CAPSULE ORAL
Qty: 90 CAPSULE | Refills: 1 | Status: SHIPPED | OUTPATIENT
Start: 2022-10-25

## 2022-12-21 ENCOUNTER — TELEPHONE (OUTPATIENT)
Dept: INTERNAL MEDICINE | Facility: CLINIC | Age: 82
End: 2022-12-21
Payer: MEDICARE

## 2022-12-21 RX ORDER — PRAVASTATIN SODIUM 40 MG
40 TABLET ORAL DAILY
Qty: 90 TABLET | Refills: 1 | Status: SHIPPED | OUTPATIENT
Start: 2022-12-21

## 2022-12-21 RX ORDER — PRAVASTATIN SODIUM 40 MG
TABLET ORAL
Qty: 90 TABLET | Refills: 0 | Status: SHIPPED | OUTPATIENT
Start: 2022-12-21 | End: 2022-12-21 | Stop reason: SDUPTHER

## 2022-12-21 NOTE — TELEPHONE ENCOUNTER
Caller: Linda Lam    Relationship: Emergency Contact    Best call back number: [phone]    What medications are you currently taking:   Current Outpatient Medications on File Prior to Visit   Medication Sig Dispense Refill   • aliskiren (TEKTURNA) 300 MG tablet Tekturna 300 mg tablet   take 1 tablet by mouth once daily     • amLODIPine (NORVASC) 10 MG tablet TAKE 1 TABLET EVERY DAY 90 tablet 1   • Bystolic 10 MG tablet TAKE 1 TABLET EVERY DAY 90 tablet 1   • losartan-hydrochlorothiazide (HYZAAR) 50-12.5 MG per tablet TAKE 1 TABLET EVERY DAY 90 tablet 1   • omeprazole (priLOSEC) 20 MG capsule Prilosec 20 mg capsule,delayed release   Take 1 capsule every day by oral route.     • pramipexole (MIRAPEX) 0.25 MG tablet Take 2 tablets by mouth Every Night. 60 tablet 3   • pravastatin (PRAVACHOL) 40 MG tablet Take 1 tablet by mouth Daily. 90 tablet 1   • rivastigmine (EXELON) 13.3 MG/24HR patch PLACE 1 PATCH ON THE SKIN DAILY AS DIRECTED BY PROVIDER . 90 patch 0   • solifenacin (VESICARE) 10 MG tablet Vesicare 10 mg tablet   Take 1 tablet every day by oral route.     • tamsulosin (FLOMAX) 0.4 MG capsule 24 hr capsule TAKE 1 CAPSULE EVERY DAY 90 capsule 1   • vitamin B-12 (CYANOCOBALAMIN) 1000 MCG tablet TAKE 1 TABLET EVERY DAY 90 tablet 2   • vitamin D (ERGOCALCIFEROL) 1.25 MG (06962 UT) capsule capsule TAKE 1 CAPSULE EVERY 7 DAYS 12 capsule 1   • [DISCONTINUED] pravastatin (PRAVACHOL) 40 MG tablet TAKE 1 TABLET EVERY DAY 90 tablet 0   • [DISCONTINUED] pravastatin (PRAVACHOL) 40 MG tablet TAKE 1 TABLET EVERY DAY 90 tablet 0     No current facility-administered medications on file prior to visit.          When did you start taking these medications: 05/13/22    Which medication are you concerned about: BYSTOLIC     Who prescribed you this medication: DR. DOS SANTOS     What are your concerns: PATIENT'S WIFE CALLED AND SAID THAT SHE RECEIVED A LETTER FROM [facility] STATING THAT THEY WOULD NO LONGER COVER THIS MEDICATION AND  TO ASK THE PROVIDER ABOUT AN ALTERNATIVE    PATIENT'S WIFE IS ON [facility] [employer]    How long have you had these concerns: N/A

## 2023-01-02 RX ORDER — NEBIVOLOL HYDROCHLORIDE 10 MG/1
10 TABLET ORAL DAILY
Qty: 90 TABLET | Refills: 3 | Status: SHIPPED | OUTPATIENT
Start: 2023-01-02 | End: 2023-01-27

## 2023-01-02 NOTE — TELEPHONE ENCOUNTER
I d/w patient's wife.  Previous intolerance to other b-blockers.  Bystolic called in.  We will try prior auth.  JUANITA

## 2023-01-24 RX ORDER — ERGOCALCIFEROL 1.25 MG/1
CAPSULE ORAL
Qty: 12 CAPSULE | Refills: 1 | Status: SHIPPED | OUTPATIENT
Start: 2023-01-24

## 2023-01-27 ENCOUNTER — TELEPHONE (OUTPATIENT)
Dept: INTERNAL MEDICINE | Facility: CLINIC | Age: 83
End: 2023-01-27
Payer: MEDICARE

## 2023-01-27 RX ORDER — METOPROLOL SUCCINATE 25 MG/1
25 TABLET, EXTENDED RELEASE ORAL DAILY
Qty: 90 TABLET | Refills: 3 | Status: SHIPPED | OUTPATIENT
Start: 2023-01-27 | End: 2023-03-17 | Stop reason: SDUPTHER

## 2023-01-27 NOTE — TELEPHONE ENCOUNTER
Problem: Patient Care Overview  Goal: Plan of Care Review  Outcome: Ongoing (interventions implemented as appropriate)  Flowsheets (Taken 2/3/2020 1735)  Progress: no change  Plan of Care Reviewed With: patient  Outcome Summary: Pt has been up in the chair most of the day today. Ambulated x2 thus far. Medicated for pain x2 see MAR. Scheduled ativan given. Tolerating regular diet, F/C in place. Bumex IV given. Tele- vpaced. VSS, cont to monitor.      Call and advise.  I sent in another medication.  JUANITA

## 2023-01-27 NOTE — TELEPHONE ENCOUNTER
Caller: Linda Lam    Relationship: Emergency Contact    Best call back number: [phone]    What medication are you requesting: REPLACEMENT FOR Bystolic 10 MG tablet    What are your current symptoms: CONTINUED     How long have you been experiencing symptoms: ONGOING    Have you had these symptoms before:    [x] Yes  [] No    Have you been treated for these symptoms before:   [x] Yes  [] No    If a prescription is needed, what is your preferred pharmacy and phone number: [facility] - [facility] [address] - [phone] [facility] [phone] FX     Additional notes:  PATIENT INSURANCE WILL NO LONGER COVER Bystolic 10 MG tablet SO PLEASE ASK DR DOS SANTOS TO SEND OVER A NEW PRESCRIPTION FOR PATIENT.     PLEASE CALL TO DISCUSS AND ADVISE.

## 2023-02-20 RX ORDER — RIVASTIGMINE 13.3 MG/24H
PATCH, EXTENDED RELEASE TRANSDERMAL
Qty: 90 PATCH | Refills: 0 | Status: SHIPPED | OUTPATIENT
Start: 2023-02-20

## 2023-02-28 DIAGNOSIS — I10 ESSENTIAL HYPERTENSION: Primary | ICD-10-CM

## 2023-02-28 DIAGNOSIS — E11.9 TYPE 2 DIABETES MELLITUS WITHOUT COMPLICATION, UNSPECIFIED WHETHER LONG TERM INSULIN USE: ICD-10-CM

## 2023-02-28 DIAGNOSIS — E78.2 MIXED HYPERLIPIDEMIA: ICD-10-CM

## 2023-02-28 LAB
ALBUMIN SERPL-MCNC: 4.2 G/DL (ref 3.5–5.2)
ALBUMIN/GLOB SERPL: 1.6 G/DL
ALP SERPL-CCNC: 55 U/L (ref 39–117)
ALT SERPL-CCNC: 13 U/L (ref 1–41)
AST SERPL-CCNC: 14 U/L (ref 1–40)
BASOPHILS # BLD AUTO: 0.02 10*3/MM3 (ref 0–0.2)
BASOPHILS NFR BLD AUTO: 0.3 % (ref 0–1.5)
BILIRUB SERPL-MCNC: 0.4 MG/DL (ref 0–1.2)
BUN SERPL-MCNC: 19 MG/DL (ref 8–23)
BUN/CREAT SERPL: 18.3 (ref 7–25)
CALCIUM SERPL-MCNC: 8.8 MG/DL (ref 8.6–10.5)
CHLORIDE SERPL-SCNC: 101 MMOL/L (ref 98–107)
CHOLEST SERPL-MCNC: 183 MG/DL (ref 0–200)
CO2 SERPL-SCNC: 31 MMOL/L (ref 22–29)
CREAT SERPL-MCNC: 1.04 MG/DL (ref 0.76–1.27)
EGFRCR SERPLBLD CKD-EPI 2021: 70.8 ML/MIN/1.73
EOSINOPHIL # BLD AUTO: 0.22 10*3/MM3 (ref 0–0.4)
EOSINOPHIL NFR BLD AUTO: 3.2 % (ref 0.3–6.2)
ERYTHROCYTE [DISTWIDTH] IN BLOOD BY AUTOMATED COUNT: 12.4 % (ref 12.3–15.4)
GLOBULIN SER CALC-MCNC: 2.6 GM/DL
GLUCOSE SERPL-MCNC: 140 MG/DL (ref 65–99)
HBA1C MFR BLD: 5.9 % (ref 4.8–5.6)
HCT VFR BLD AUTO: 47.3 % (ref 37.5–51)
HDLC SERPL-MCNC: 63 MG/DL (ref 40–60)
HGB BLD-MCNC: 16 G/DL (ref 13–17.7)
IMM GRANULOCYTES # BLD AUTO: 0.01 10*3/MM3 (ref 0–0.05)
IMM GRANULOCYTES NFR BLD AUTO: 0.1 % (ref 0–0.5)
LDLC SERPL CALC-MCNC: 106 MG/DL (ref 0–100)
LYMPHOCYTES # BLD AUTO: 1.48 10*3/MM3 (ref 0.7–3.1)
LYMPHOCYTES NFR BLD AUTO: 21.3 % (ref 19.6–45.3)
MCH RBC QN AUTO: 31.8 PG (ref 26.6–33)
MCHC RBC AUTO-ENTMCNC: 33.8 G/DL (ref 31.5–35.7)
MCV RBC AUTO: 94 FL (ref 79–97)
MONOCYTES # BLD AUTO: 0.65 10*3/MM3 (ref 0.1–0.9)
MONOCYTES NFR BLD AUTO: 9.4 % (ref 5–12)
NEUTROPHILS # BLD AUTO: 4.56 10*3/MM3 (ref 1.7–7)
NEUTROPHILS NFR BLD AUTO: 65.7 % (ref 42.7–76)
NRBC BLD AUTO-RTO: 0 /100 WBC (ref 0–0.2)
PLATELET # BLD AUTO: 222 10*3/MM3 (ref 140–450)
POTASSIUM SERPL-SCNC: 4.2 MMOL/L (ref 3.5–5.2)
PROT SERPL-MCNC: 6.8 G/DL (ref 6–8.5)
RBC # BLD AUTO: 5.03 10*6/MM3 (ref 4.14–5.8)
SODIUM SERPL-SCNC: 141 MMOL/L (ref 136–145)
TRIGL SERPL-MCNC: 77 MG/DL (ref 0–150)
VLDLC SERPL CALC-MCNC: 14 MG/DL (ref 5–40)
WBC # BLD AUTO: 6.94 10*3/MM3 (ref 3.4–10.8)

## 2023-03-10 ENCOUNTER — OFFICE VISIT (OUTPATIENT)
Dept: INTERNAL MEDICINE | Facility: CLINIC | Age: 83
End: 2023-03-10
Payer: MEDICARE

## 2023-03-10 VITALS
BODY MASS INDEX: 21.76 KG/M2 | HEIGHT: 70 IN | OXYGEN SATURATION: 98 % | HEART RATE: 54 BPM | DIASTOLIC BLOOD PRESSURE: 64 MMHG | SYSTOLIC BLOOD PRESSURE: 158 MMHG | WEIGHT: 152 LBS

## 2023-03-10 DIAGNOSIS — E78.2 MIXED HYPERLIPIDEMIA: ICD-10-CM

## 2023-03-10 DIAGNOSIS — E11.9 TYPE 2 DIABETES MELLITUS WITHOUT COMPLICATION, UNSPECIFIED WHETHER LONG TERM INSULIN USE: Primary | ICD-10-CM

## 2023-03-10 DIAGNOSIS — I10 ESSENTIAL HYPERTENSION: ICD-10-CM

## 2023-03-10 DIAGNOSIS — F02.80 FRONTOTEMPORAL DEMENTIA: ICD-10-CM

## 2023-03-10 DIAGNOSIS — G31.09 FRONTOTEMPORAL DEMENTIA: ICD-10-CM

## 2023-03-10 PROCEDURE — 3078F DIAST BP <80 MM HG: CPT | Performed by: INTERNAL MEDICINE

## 2023-03-10 PROCEDURE — 1160F RVW MEDS BY RX/DR IN RCRD: CPT | Performed by: INTERNAL MEDICINE

## 2023-03-10 PROCEDURE — 1159F MED LIST DOCD IN RCRD: CPT | Performed by: INTERNAL MEDICINE

## 2023-03-10 PROCEDURE — 99214 OFFICE O/P EST MOD 30 MIN: CPT | Performed by: INTERNAL MEDICINE

## 2023-03-10 PROCEDURE — 3077F SYST BP >= 140 MM HG: CPT | Performed by: INTERNAL MEDICINE

## 2023-03-10 NOTE — PROGRESS NOTES
Subjective     Calos Lam is a 82 y.o. male who presents with   Chief Complaint   Patient presents with   • Diabetes   • Hypertension   • Hyperlipidemia       History of Present Illness     The following data was reviewed by: Karlie Hart MD on 03/10/2023:  Common labs    Common Labs 8/22/22 8/22/22 8/22/22 8/22/22 2/28/23 2/28/23 2/28/23 2/28/23    0932 0932 0932 0932 0941 0941 0941 0941   Glucose  161 (A)    140 (A)     BUN  24    19     Creatinine  1.05    1.04     Sodium  141    141     Potassium  3.8    4.2     Chloride  99    101     Calcium  9.5    8.8     Total Protein  6.4    6.8     Albumin  4.1    4.2     Total Bilirubin  0.5    0.4     Alkaline Phosphatase  53    55     AST (SGOT)  12    14     ALT (SGPT)  11    13     WBC 6.8    6.94      Hemoglobin 15.7    16.0      Hematocrit 47.8    47.3      Platelets 250    222      Total Cholesterol   173    183    Triglycerides   80    77    HDL Cholesterol   60    63 (A)    LDL Cholesterol    98    106 (A)    Hemoglobin A1C    6.0 (A)    5.90 (A)   (A) Abnormal value       Comments are available for some flowsheets but are not being displayed.           Dm-2.  A1c is under excellent control.  HTN.  BP is up today.   Recently changed b-blocker because of insurance.    HLD.  Fairly good control on pravastatin.   FTD.  Wife has noticed some progression.      Review of Systems   Respiratory: Negative.    Cardiovascular: Negative.        The following portions of the patient's history were reviewed and updated as appropriate: allergies, current medications and problem list.    Patient Active Problem List    Diagnosis Date Noted   • B12 deficiency 09/11/2019     Note Last Updated: 9/16/2019     IF is normal.  Recommend oral supplement.       • Vitamin D deficiency 03/07/2019   • Incomplete RBBB 03/07/2019   • PLMD (periodic limb movement disorder) 02/10/2018   • History of bladder cancer 02/18/2017     Note Last Updated: 8/29/2022     2009 history of bladder  "cancer treated with BCG     • RLS (restless legs syndrome) 02/18/2017   • Type 2 diabetes mellitus without complication (HCC) 02/15/2017   • Essential hypertension    • Mixed hyperlipidemia    • Frontotemporal dementia (HCC) 01/18/2017   • Sleep-related hypoventilation/hypoxia  12/11/2016       Current Outpatient Medications on File Prior to Visit   Medication Sig Dispense Refill   • aliskiren (TEKTURNA) 300 MG tablet Tekturna 300 mg tablet   take 1 tablet by mouth once daily     • amLODIPine (NORVASC) 10 MG tablet TAKE 1 TABLET EVERY DAY 90 tablet 1   • losartan-hydrochlorothiazide (HYZAAR) 50-12.5 MG per tablet TAKE 1 TABLET EVERY DAY 90 tablet 1   • metoprolol succinate XL (Toprol XL) 25 MG 24 hr tablet Take 1 tablet by mouth Daily. 90 tablet 3   • omeprazole (priLOSEC) 20 MG capsule Prilosec 20 mg capsule,delayed release   Take 1 capsule every day by oral route.     • pramipexole (MIRAPEX) 0.25 MG tablet Take 2 tablets by mouth Every Night. 60 tablet 3   • pravastatin (PRAVACHOL) 40 MG tablet Take 1 tablet by mouth Daily. 90 tablet 1   • rivastigmine (EXELON) 13.3 MG/24HR patch PLACE 1 PATCH ON THE SKIN DAILY AS DIRECTED BY PROVIDER . 90 patch 0   • solifenacin (VESICARE) 10 MG tablet Vesicare 10 mg tablet   Take 1 tablet every day by oral route.     • tamsulosin (FLOMAX) 0.4 MG capsule 24 hr capsule TAKE 1 CAPSULE EVERY DAY 90 capsule 1   • vitamin B-12 (CYANOCOBALAMIN) 1000 MCG tablet TAKE 1 TABLET EVERY DAY 90 tablet 2   • vitamin D (ERGOCALCIFEROL) 1.25 MG (03927 UT) capsule capsule TAKE 1 CAPSULE EVERY 7 DAYS 12 capsule 1     No current facility-administered medications on file prior to visit.       Objective     /64   Pulse 54   Ht 177.8 cm (70\")   Wt 68.9 kg (152 lb)   SpO2 98%   BMI 21.81 kg/m²     Physical Exam  Constitutional:       Appearance: He is well-developed.   HENT:      Head: Atraumatic.   Cardiovascular:      Rate and Rhythm: Normal rate and regular rhythm.      Heart sounds: " Normal heart sounds.   Pulmonary:      Effort: Pulmonary effort is normal.      Breath sounds: Normal breath sounds.   Skin:     General: Skin is warm and dry.   Neurological:      Mental Status: He is alert and oriented to person, place, and time.         Assessment & Plan   Diagnoses and all orders for this visit:    1. Type 2 diabetes mellitus without complication, unspecified whether long term insulin use (HCC) (Primary)    2. Essential hypertension    3. Mixed hyperlipidemia    4. Frontotemporal dementia (HCC)        Discussion    Dm-2.  Continue attention to healthy diet.  HTN. The patient is instructed to monitor at home and call in checks.    HLD. Control is fairly good.  The patient is advised to continue current dosage of pravastatin.    FTD.  Mild progression.  Continue to monitor on Exelon.          Future Appointments   Date Time Provider Department Center   3/10/2023  2:45 PM Karlie Hart MD [facility]   5/11/2023  1:00 PM Presley Kyle APRN [facility]   12/5/2023  1:20 PM [facility]   12/12/2023  2:15 PM Karlie Hart MD [facility]

## 2023-03-17 RX ORDER — METOPROLOL SUCCINATE 50 MG/1
50 TABLET, EXTENDED RELEASE ORAL DAILY
Qty: 90 TABLET | Refills: 3 | Status: SHIPPED | OUTPATIENT
Start: 2023-03-17

## 2023-05-01 DIAGNOSIS — I10 ESSENTIAL HYPERTENSION: ICD-10-CM

## 2023-05-01 RX ORDER — LOSARTAN POTASSIUM AND HYDROCHLOROTHIAZIDE 12.5; 5 MG/1; MG/1
TABLET ORAL
Qty: 90 TABLET | Refills: 1 | Status: SHIPPED | OUTPATIENT
Start: 2023-05-01

## 2023-05-01 RX ORDER — AMLODIPINE BESYLATE 10 MG/1
TABLET ORAL
Qty: 90 TABLET | Refills: 1 | Status: SHIPPED | OUTPATIENT
Start: 2023-05-01

## 2023-05-01 RX ORDER — NEBIVOLOL HYDROCHLORIDE 10 MG/1
TABLET ORAL
Qty: 90 TABLET | Refills: 1 | Status: SHIPPED | OUTPATIENT
Start: 2023-05-01

## 2023-05-11 ENCOUNTER — OFFICE VISIT (OUTPATIENT)
Dept: NEUROLOGY | Facility: CLINIC | Age: 83
End: 2023-05-11
Payer: MEDICARE

## 2023-05-11 VITALS
HEIGHT: 70 IN | DIASTOLIC BLOOD PRESSURE: 72 MMHG | SYSTOLIC BLOOD PRESSURE: 148 MMHG | BODY MASS INDEX: 22.19 KG/M2 | OXYGEN SATURATION: 97 % | HEART RATE: 58 BPM | WEIGHT: 155 LBS

## 2023-05-11 DIAGNOSIS — G31.09 FRONTOTEMPORAL DEMENTIA: Primary | ICD-10-CM

## 2023-05-11 DIAGNOSIS — F02.80 FRONTOTEMPORAL DEMENTIA: Primary | ICD-10-CM

## 2023-05-11 PROCEDURE — 3078F DIAST BP <80 MM HG: CPT | Performed by: NURSE PRACTITIONER

## 2023-05-11 PROCEDURE — 1160F RVW MEDS BY RX/DR IN RCRD: CPT | Performed by: NURSE PRACTITIONER

## 2023-05-11 PROCEDURE — 1159F MED LIST DOCD IN RCRD: CPT | Performed by: NURSE PRACTITIONER

## 2023-05-11 PROCEDURE — 3077F SYST BP >= 140 MM HG: CPT | Performed by: NURSE PRACTITIONER

## 2023-05-11 PROCEDURE — 99213 OFFICE O/P EST LOW 20 MIN: CPT | Performed by: NURSE PRACTITIONER

## 2023-05-11 NOTE — PROGRESS NOTES
Subjective   Calos Lam is a 82 y.o. male presenting for follow-up for frontotemporal dementia.  He was last seen 1 year ago.  He is accompanied by his wife.    His wife notes that he has been doing fairly well and has had a slow decline.  He remains very active both physically and mentally.  He continues to drive to the grocery store which is 2 blocks from their house.  He only goes during the day and does not drive when the weather is not ideal.  He is able to do quite a bit, however his wife often has to remind him of things.  She says that they worked on starting their garden yesterday.  He was able to do all the physical activity, however she states that she had to give him instructions step-by-step for each task.  He walks a couple of miles 3 days a week.  He remains on Exelon patch 13.3 mg per 24 hours.  He is on vitamin B12 supplementation as well as vitamin D.  He has a history of sleep apnea that is treated with mouthpiece.  His restless legs are treated with pramipexole.  He was previously a heavy drinker.  He no longer drinks.  His wife does state that he has fairly significant fatigue during the day.  He reads several hours a day and she says that he often dozes off during this.  He is awake at night.  His wife thinks this is because he sleeps quite a bit during the day and she says she is going to try to keep him awake in the day to see if this will improve nighttime sleep.    History of Present Illness     Review of Systems   Allergic/Immunologic: Negative for environmental allergies, food allergies and immunocompromised state.   Neurological: Positive for memory problem and confusion. Negative for dizziness, tremors, seizures, syncope, facial asymmetry, speech difficulty, weakness, light-headedness, numbness and headache.   Hematological: Negative for adenopathy. Bruises/bleeds easily.   Psychiatric/Behavioral: Positive for decreased concentration. Negative for agitation and behavioral problems.        I have reviewed and confirmed the accuracy of the patient's history: Chief complaint, HPI, ROS, Subjective and Past Family Social History as entered by the MA/LPN/RN. Beatriz Gavin MA 05/11/23      Objective     Exam performed today as documented below. No changes noted.     Physical Examination:  General: 80-year-old male in no acute distress.  HEENT: Normocephalic, no evidence of trauma.   Neck: Supple.   Heart: RRR  Extremities: No pedal edema.     Neurological examination:   Mental Status: Awake, alert, oriented x3.   HCF: No aphasia or dysarthria.  Knowledge of recent events intact.    CN:      I:              II: Visual fields full without left inattention              III, IV, VI: Eye movements intact without nystagmus or ptosis.  Pupils equal  round and reactive to light.              V,VII: Light touch and pinprick intact all 3 divisions of V.  Facial muscles symmetrical.              VIII: Hearing intact to finger rub              IX,X: Soft palate elevates symmetrically              XI: Sternomastoid and trapezius are strong.              XII: Tongue midline without atrophy or fasciculations  Motor: Normal tone and bulk in the upper and lower extremities              Power testing: Full power in all muscles tested arms and legs  Reflexes: Upper extremities: Upper extremities trace                   Lower extremities: Knee jerks +1                   Toe signs: Downgoing  Sensory: Light touch: Diffusely intact  Cerebellar: Finger-to-nose: Mild postural tremor                      Rapid movement: Intact                      Heel-to-shin: Normal  Gait and Station: Normal     Assessment & Plan   Diagnoses and all orders for this visit:    1. Frontotemporal dementia (Primary)    1. Frontotemporal dementia: He is doing very well. Slight decline since his last visit. We discussed the importance of continuing physical and cognitive activity. Takes drivers test yearly. Okay to drive 2 blocks to the grocery  store during the day.   2. RLS and sleep apnea are both treated and well controlled at this time.

## 2023-05-11 NOTE — LETTER
May 11, 2023       No Recipients    Patient: Calos Lam   YOB: 1940   Date of Visit: 5/11/2023       Dear Dr. Hopper Recipients:    Thank you for referring Calos Lam to me for evaluation. Below are the relevant portions of my assessment and plan of care.    If you have questions, please do not hesitate to call me. I look forward to following Calos along with you.         Sincerely,        ERIKA De La Rosa        CC:   No Recipients    Presley Kyle APRN  05/11/23 1336  Signed  Subjective    Calos Lam is a 82 y.o. male presenting for follow-up for frontotemporal dementia.  He was last seen 1 year ago.  He is accompanied by his wife.    His wife notes that he has been doing fairly well and has had a slow decline.  He remains very active both physically and mentally.  He continues to drive to the grocery store which is 2 blocks from their house.  He only goes during the day and does not drive when the weather is not ideal.  He is able to do quite a bit, however his wife often has to remind him of things.  She says that they worked on starting their garden yesterday.  He was able to do all the physical activity, however she states that she had to give him instructions step-by-step for each task.  He walks a couple of miles 3 days a week.  He remains on Exelon patch 13.3 mg per 24 hours.  He is on vitamin B12 supplementation as well as vitamin D.  He has a history of sleep apnea that is treated with mouthpiece.  His restless legs are treated with pramipexole.  He was previously a heavy drinker.  He no longer drinks.  His wife does state that he has fairly significant fatigue during the day.  He reads several hours a day and she says that he often dozes off during this.  He is awake at night.  His wife thinks this is because he sleeps quite a bit during the day and she says she is going to try to keep him awake in the day to see if this will improve nighttime sleep.    History  of Present Illness     Review of Systems   Allergic/Immunologic: Negative for environmental allergies, food allergies and immunocompromised state.   Neurological: Positive for memory problem and confusion. Negative for dizziness, tremors, seizures, syncope, facial asymmetry, speech difficulty, weakness, light-headedness, numbness and headache.   Hematological: Negative for adenopathy. Bruises/bleeds easily.   Psychiatric/Behavioral: Positive for decreased concentration. Negative for agitation and behavioral problems.       I have reviewed and confirmed the accuracy of the patient's history: Chief complaint, HPI, ROS, Subjective and Past Family Social History as entered by the MA/LPN/RN. Beatriz Gavin MA 05/11/23      Objective      Exam performed today as documented below. No changes noted.     Physical Examination:  General: 80-year-old male in no acute distress.  HEENT: Normocephalic, no evidence of trauma.   Neck: Supple.   Heart: RRR  Extremities: No pedal edema.     Neurological examination:   Mental Status: Awake, alert, oriented x3.   HCF: No aphasia or dysarthria.  Knowledge of recent events intact.    CN:      I:              II: Visual fields full without left inattention              III, IV, VI: Eye movements intact without nystagmus or ptosis.  Pupils equal  round and reactive to light.              V,VII: Light touch and pinprick intact all 3 divisions of V.  Facial muscles symmetrical.              VIII: Hearing intact to finger rub              IX,X: Soft palate elevates symmetrically              XI: Sternomastoid and trapezius are strong.              XII: Tongue midline without atrophy or fasciculations  Motor: Normal tone and bulk in the upper and lower extremities              Power testing: Full power in all muscles tested arms and legs  Reflexes: Upper extremities: Upper extremities trace                   Lower extremities: Knee jerks +1                   Toe signs: Downgoing  Sensory: Light  touch: Diffusely intact  Cerebellar: Finger-to-nose: Mild postural tremor                      Rapid movement: Intact                      Heel-to-shin: Normal  Gait and Station: Normal     Assessment & Plan   Diagnoses and all orders for this visit:    1. Frontotemporal dementia (Primary)    1. Frontotemporal dementia: He is doing very well. Slight decline since his last visit. We discussed the importance of continuing physical and cognitive activity. Takes drivers test yearly. Okay to drive 2 blocks to the grocery store during the day.   2. RLS and sleep apnea are both treated and well controlled at this time.

## 2023-05-12 RX ORDER — PRAVASTATIN SODIUM 40 MG
TABLET ORAL
Qty: 90 TABLET | Refills: 1 | Status: SHIPPED | OUTPATIENT
Start: 2023-05-12

## 2023-05-25 RX ORDER — METOPROLOL SUCCINATE 50 MG/1
50 TABLET, EXTENDED RELEASE ORAL DAILY
Qty: 90 TABLET | Refills: 3 | Status: SHIPPED | OUTPATIENT
Start: 2023-05-25

## 2023-08-07 RX ORDER — LANOLIN ALCOHOL/MO/W.PET/CERES
CREAM (GRAM) TOPICAL
Qty: 90 TABLET | Refills: 2 | Status: SHIPPED | OUTPATIENT
Start: 2023-08-07

## 2023-08-28 RX ORDER — TAMSULOSIN HYDROCHLORIDE 0.4 MG/1
CAPSULE ORAL
Qty: 90 CAPSULE | Refills: 1 | Status: SHIPPED | OUTPATIENT
Start: 2023-08-28

## 2023-09-05 RX ORDER — RIVASTIGMINE 13.3 MG/24H
PATCH, EXTENDED RELEASE TRANSDERMAL
Qty: 90 PATCH | Refills: 0 | Status: SHIPPED | OUTPATIENT
Start: 2023-09-05

## 2023-10-30 RX ORDER — ERGOCALCIFEROL 1.25 MG/1
CAPSULE ORAL
Qty: 12 CAPSULE | Refills: 10 | Status: SHIPPED | OUTPATIENT
Start: 2023-10-30

## 2023-11-01 DIAGNOSIS — I10 ESSENTIAL HYPERTENSION: Primary | ICD-10-CM

## 2023-11-01 RX ORDER — METOPROLOL SUCCINATE 50 MG/1
50 TABLET, EXTENDED RELEASE ORAL DAILY
Qty: 90 TABLET | Refills: 3 | Status: SHIPPED | OUTPATIENT
Start: 2023-11-01

## 2023-12-05 DIAGNOSIS — E11.9 TYPE 2 DIABETES MELLITUS WITHOUT COMPLICATION, UNSPECIFIED WHETHER LONG TERM INSULIN USE: ICD-10-CM

## 2023-12-05 DIAGNOSIS — E78.2 MIXED HYPERLIPIDEMIA: ICD-10-CM

## 2023-12-05 DIAGNOSIS — I10 ESSENTIAL HYPERTENSION: Primary | ICD-10-CM

## 2023-12-05 DIAGNOSIS — E55.9 VITAMIN D DEFICIENCY: ICD-10-CM

## 2023-12-05 DIAGNOSIS — E53.8 B12 DEFICIENCY: ICD-10-CM

## 2023-12-06 LAB
25(OH)D3+25(OH)D2 SERPL-MCNC: 67.1 NG/ML (ref 30–100)
ALBUMIN SERPL-MCNC: 4.4 G/DL (ref 3.5–5.2)
ALBUMIN/CREAT UR: 27 MG/G CREAT (ref 0–29)
ALBUMIN/GLOB SERPL: 1.8 G/DL
ALP SERPL-CCNC: 61 U/L (ref 39–117)
ALT SERPL-CCNC: 15 U/L (ref 1–41)
APPEARANCE UR: CLEAR
AST SERPL-CCNC: 17 U/L (ref 1–40)
BACTERIA #/AREA URNS HPF: NORMAL /HPF
BASOPHILS # BLD AUTO: 0.04 10*3/MM3 (ref 0–0.2)
BASOPHILS NFR BLD AUTO: 0.5 % (ref 0–1.5)
BILIRUB SERPL-MCNC: 0.5 MG/DL (ref 0–1.2)
BILIRUB UR QL STRIP: NEGATIVE
BUN SERPL-MCNC: 20 MG/DL (ref 8–23)
BUN/CREAT SERPL: 19.6 (ref 7–25)
CALCIUM SERPL-MCNC: 9.6 MG/DL (ref 8.6–10.5)
CASTS URNS MICRO: NORMAL
CHLORIDE SERPL-SCNC: 101 MMOL/L (ref 98–107)
CHOLEST SERPL-MCNC: 211 MG/DL (ref 0–200)
CO2 SERPL-SCNC: 29.2 MMOL/L (ref 22–29)
COLOR UR: YELLOW
CREAT SERPL-MCNC: 1.02 MG/DL (ref 0.76–1.27)
CREAT UR-MCNC: 72.1 MG/DL
EGFRCR SERPLBLD CKD-EPI 2021: 72.5 ML/MIN/1.73
EOSINOPHIL # BLD AUTO: 0.23 10*3/MM3 (ref 0–0.4)
EOSINOPHIL NFR BLD AUTO: 2.9 % (ref 0.3–6.2)
EPI CELLS #/AREA URNS HPF: NORMAL /HPF
ERYTHROCYTE [DISTWIDTH] IN BLOOD BY AUTOMATED COUNT: 12.2 % (ref 12.3–15.4)
GLOBULIN SER CALC-MCNC: 2.5 GM/DL
GLUCOSE SERPL-MCNC: 101 MG/DL (ref 65–99)
GLUCOSE UR QL STRIP: NEGATIVE
HBA1C MFR BLD: 6.1 % (ref 4.8–5.6)
HCT VFR BLD AUTO: 48 % (ref 37.5–51)
HDLC SERPL-MCNC: 59 MG/DL (ref 40–60)
HGB BLD-MCNC: 16.3 G/DL (ref 13–17.7)
HGB UR QL STRIP: NEGATIVE
IMM GRANULOCYTES # BLD AUTO: 0.01 10*3/MM3 (ref 0–0.05)
IMM GRANULOCYTES NFR BLD AUTO: 0.1 % (ref 0–0.5)
KETONES UR QL STRIP: NEGATIVE
LDLC SERPL CALC-MCNC: 133 MG/DL (ref 0–100)
LEUKOCYTE ESTERASE UR QL STRIP: NEGATIVE
LYMPHOCYTES # BLD AUTO: 1.84 10*3/MM3 (ref 0.7–3.1)
LYMPHOCYTES NFR BLD AUTO: 23.6 % (ref 19.6–45.3)
MCH RBC QN AUTO: 31.6 PG (ref 26.6–33)
MCHC RBC AUTO-ENTMCNC: 34 G/DL (ref 31.5–35.7)
MCV RBC AUTO: 93 FL (ref 79–97)
MICROALBUMIN UR-MCNC: 19.4 UG/ML
MONOCYTES # BLD AUTO: 0.64 10*3/MM3 (ref 0.1–0.9)
MONOCYTES NFR BLD AUTO: 8.2 % (ref 5–12)
NEUTROPHILS # BLD AUTO: 5.05 10*3/MM3 (ref 1.7–7)
NEUTROPHILS NFR BLD AUTO: 64.7 % (ref 42.7–76)
NITRITE UR QL STRIP: NEGATIVE
NRBC BLD AUTO-RTO: 0 /100 WBC (ref 0–0.2)
PH UR STRIP: 6.5 [PH] (ref 5–8)
PLATELET # BLD AUTO: 235 10*3/MM3 (ref 140–450)
POTASSIUM SERPL-SCNC: 4.1 MMOL/L (ref 3.5–5.2)
PROT SERPL-MCNC: 6.9 G/DL (ref 6–8.5)
PROT UR QL STRIP: NEGATIVE
RBC # BLD AUTO: 5.16 10*6/MM3 (ref 4.14–5.8)
RBC #/AREA URNS HPF: NORMAL /HPF
SODIUM SERPL-SCNC: 141 MMOL/L (ref 136–145)
SP GR UR STRIP: 1.01 (ref 1–1.03)
TRIGL SERPL-MCNC: 105 MG/DL (ref 0–150)
TSH SERPL DL<=0.005 MIU/L-ACNC: 1.35 UIU/ML (ref 0.27–4.2)
UROBILINOGEN UR STRIP-MCNC: NORMAL MG/DL
VIT B12 SERPL-MCNC: 904 PG/ML (ref 211–946)
VLDLC SERPL CALC-MCNC: 19 MG/DL (ref 5–40)
WBC # BLD AUTO: 7.81 10*3/MM3 (ref 3.4–10.8)
WBC #/AREA URNS HPF: NORMAL /HPF

## 2023-12-12 ENCOUNTER — OFFICE VISIT (OUTPATIENT)
Dept: INTERNAL MEDICINE | Facility: CLINIC | Age: 83
End: 2023-12-12
Payer: MEDICARE

## 2023-12-12 VITALS
DIASTOLIC BLOOD PRESSURE: 66 MMHG | OXYGEN SATURATION: 97 % | WEIGHT: 155 LBS | BODY MASS INDEX: 22.19 KG/M2 | HEIGHT: 70 IN | SYSTOLIC BLOOD PRESSURE: 166 MMHG | HEART RATE: 57 BPM

## 2023-12-12 DIAGNOSIS — I10 ESSENTIAL HYPERTENSION: ICD-10-CM

## 2023-12-12 DIAGNOSIS — Z00.00 WELL ADULT EXAM: ICD-10-CM

## 2023-12-12 DIAGNOSIS — Z00.00 MEDICARE ANNUAL WELLNESS VISIT, SUBSEQUENT: Primary | ICD-10-CM

## 2023-12-12 RX ORDER — LOSARTAN POTASSIUM AND HYDROCHLOROTHIAZIDE 12.5; 5 MG/1; MG/1
1 TABLET ORAL DAILY
Qty: 90 TABLET | Refills: 3 | Status: SHIPPED | OUTPATIENT
Start: 2023-12-12

## 2023-12-12 RX ORDER — AMLODIPINE BESYLATE 10 MG/1
10 TABLET ORAL DAILY
Qty: 90 TABLET | Refills: 3 | Status: SHIPPED | OUTPATIENT
Start: 2023-12-12

## 2023-12-12 RX ORDER — METOPROLOL SUCCINATE 50 MG/1
50 TABLET, EXTENDED RELEASE ORAL DAILY
Qty: 90 TABLET | Refills: 3 | Status: SHIPPED | OUTPATIENT
Start: 2023-12-12

## 2023-12-12 RX ORDER — TRAZODONE HYDROCHLORIDE 50 MG/1
50 TABLET ORAL NIGHTLY
Qty: 90 TABLET | Refills: 3 | Status: SHIPPED | OUTPATIENT
Start: 2023-12-12

## 2023-12-12 NOTE — PROGRESS NOTES
The ABCs of the Annual Wellness Visit  Subsequent Medicare Wellness Visit    Subjective    Calos Lam is a 82 y.o. male who presents for a Subsequent Medicare Wellness Visit.    The following portions of the patient's history were reviewed and   updated as appropriate: allergies, current medications, past family history, past medical history, past social history, past surgical history, and problem list.    Compared to one year ago, the patient feels his physical   health is the same.    Compared to one year ago, the patient feels his mental   health is the same.    Recent Hospitalizations:  He was not admitted to the hospital during the last year.       Current Medical Providers:  Patient Care Team:  Karlie Hart MD as PCP - General (Internal Medicine)  Hayden Santiago MD as Neurologist (Neurology)    Outpatient Medications Prior to Visit   Medication Sig Dispense Refill    pramipexole (MIRAPEX) 0.25 MG tablet Take 2 tablets by mouth Every Night. 60 tablet 3    pravastatin (PRAVACHOL) 40 MG tablet TAKE 1 TABLET EVERY DAY 90 tablet 1    rivastigmine (EXELON) 13.3 MG/24HR patch PLACE 1 PATCH ON THE SKIN DAILY AS DIRECTED BY PROVIDER . 90 patch 0    solifenacin (VESICARE) 10 MG tablet Vesicare 10 mg tablet   Take 1 tablet every day by oral route.      tamsulosin (FLOMAX) 0.4 MG capsule 24 hr capsule TAKE 1 CAPSULE EVERY DAY 90 capsule 1    vitamin B-12 (CYANOCOBALAMIN) 1000 MCG tablet TAKE 1 TABLET EVERY DAY 90 tablet 2    vitamin D (ERGOCALCIFEROL) 1.25 MG (01476 UT) capsule capsule TAKE 1 CAPSULE EVERY 7 DAYS 12 capsule 10    aliskiren (TEKTURNA) 300 MG tablet Tekturna 300 mg tablet   take 1 tablet by mouth once daily      amLODIPine (NORVASC) 10 MG tablet TAKE 1 TABLET EVERY DAY 90 tablet 1    losartan-hydrochlorothiazide (HYZAAR) 50-12.5 MG per tablet TAKE 1 TABLET EVERY DAY 90 tablet 1    metoprolol succinate XL (Toprol XL) 50 MG 24 hr tablet Take 1 tablet by mouth Daily. 90 tablet 3    omeprazole  "(priLOSEC) 20 MG capsule Prilosec 20 mg capsule,delayed release   Take 1 capsule every day by oral route.       No facility-administered medications prior to visit.       No opioid medication identified on active medication list. I have reviewed chart for other potential  high risk medication/s and harmful drug interactions in the elderly.        Aspirin is not on active medication list.  Aspirin use is not indicated based on review of current medical condition/s. Risk of harm outweighs potential benefits.  .    Patient Active Problem List   Diagnosis    Sleep-related hypoventilation/hypoxia     Frontotemporal dementia    Essential hypertension    Mixed hyperlipidemia    Type 2 diabetes mellitus without complication    History of bladder cancer    RLS (restless legs syndrome)    PLMD (periodic limb movement disorder)    Vitamin D deficiency    Incomplete RBBB    B12 deficiency     Advance Care Planning   Advance Care Planning     Advance Directive is not on file.  ACP discussion was held with the patient during this visit. Patient has an advance directive (not in EMR), copy requested.     Objective    Vitals:    23 1343   BP: 166/66   Pulse: 57   SpO2: 97%   Weight: 70.3 kg (155 lb)   Height: 177.8 cm (70\")   PainSc: 0-No pain     Estimated body mass index is 22.24 kg/m² as calculated from the following:    Height as of this encounter: 177.8 cm (70\").    Weight as of this encounter: 70.3 kg (155 lb).    BMI is within normal parameters. No other follow-up for BMI required.      Does the patient have evidence of cognitive impairment? Yes    Lab Results   Component Value Date    CHLPL 211 (H) 2023    TRIG 105 2023    HDL 59 2023     (H) 2023    VLDL 19 2023    HGBA1C 6.10 (H) 2023        HEALTH RISK ASSESSMENT    Smoking Status:  Social History     Tobacco Use   Smoking Status Former    Types: Cigarettes    Quit date: 1973    Years since quittin.9   Smokeless " Tobacco Never     Alcohol Consumption:  Social History     Substance and Sexual Activity   Alcohol Use No     Fall Risk Screen:    NORAADI Fall Risk Assessment was completed, and patient is at LOW risk for falls.Assessment completed on:2023    Depression Screenin/12/2023     1:42 PM   PHQ-2/PHQ-9 Depression Screening   Little Interest or Pleasure in Doing Things 0-->not at all   Feeling Down, Depressed or Hopeless 0-->not at all   PHQ-9: Brief Depression Severity Measure Score 0       Health Habits and Functional and Cognitive Screenin/12/2023     1:45 PM   Functional & Cognitive Status   Do you have difficulty preparing food and eating? No   Do you have difficulty bathing yourself, getting dressed or grooming yourself? No   Do you have difficulty using the toilet? No   Do you have difficulty moving around from place to place? No   Do you have trouble with steps or getting out of a bed or a chair? No   Current Diet Well Balanced Diet   Dental Exam Up to date   Eye Exam Up to date   Exercise (times per week) 3 times per week   Current Exercises Include Cardiovascular Workout;Light Weights;Walking   Do you need help using the phone?  No   Are you deaf or do you have serious difficulty hearing?  No   Do you need help to go to places out of walking distance? No   Do you need help shopping? No   Do you need help preparing meals?  No   Do you need help with housework?  No   Do you need help with laundry? No   Do you need help taking your medications? No   Do you need help managing money? No   Do you ever drive or ride in a car without wearing a seat belt? No   Have you felt unusual stress, anger or loneliness in the last month? No   Who do you live with? Spouse   If you need help, do you have trouble finding someone available to you? No   Have you been bothered in the last four weeks by sexual problems? No   Do you have difficulty concentrating, remembering or making decisions? No  "      Age-appropriate Screening Schedule:  Refer to the list below for future screening recommendations based on patient's age, sex and/or medical conditions. Orders for these recommended tests are listed in the plan section. The patient has been provided with a written plan.    Health Maintenance   Topic Date Due    DIABETIC EYE EXAM  07/19/2023    ANNUAL WELLNESS VISIT  08/29/2023    COVID-19 Vaccine (6 - 2023-24 season) 09/01/2023    TDAP/TD VACCINES (1 - Tdap) 05/05/2024 (Originally 5/5/2014)    HEMOGLOBIN A1C  06/05/2024    LIPID PANEL  12/05/2024    URINE MICROALBUMIN  12/05/2024    INFLUENZA VACCINE  Completed    Pneumococcal Vaccine 65+  Completed    ZOSTER VACCINE  Discontinued    COLORECTAL CANCER SCREENING  Discontinued                  CMS Preventative Services Quick Reference  Risk Factors Identified During Encounter  Immunizations Discussed/Encouraged: Influenza and COVID19  The above risks/problems have been discussed with the patient.  Pertinent information has been shared with the patient in the After Visit Summary.  An After Visit Summary and PPPS were made available to the patient.    Follow Up:   Next Medicare Wellness visit to be scheduled in 1 year.       Additional E&M Note during same encounter follows:  Patient has multiple medical problems which are significant and separately identifiable that require additional work above and beyond the Medicare Wellness Visit.      Chief Complaint  Medicare Wellness-subsequent    Subjective        HPI  Calos Lam is also being seen today for     Dm-2.  Control is good.    HTN.  BP is elevated in office today.    HLD.  Fair control.      Review of Systems   Respiratory: Negative.     Cardiovascular: Negative.        Objective   Vital Signs:  /66   Pulse 57   Ht 177.8 cm (70\")   Wt 70.3 kg (155 lb)   SpO2 97%   BMI 22.24 kg/m²     Physical Exam  Constitutional:       Appearance: He is well-developed.   HENT:      Head: Atraumatic. "   Cardiovascular:      Rate and Rhythm: Normal rate and regular rhythm.      Heart sounds: Normal heart sounds.   Pulmonary:      Effort: Pulmonary effort is normal.      Breath sounds: Normal breath sounds.   Skin:     General: Skin is warm and dry.   Neurological:      Mental Status: He is alert and oriented to person, place, and time.          The following data was reviewed by: Karlie Hart MD on 12/12/2023:  Common labs          2/28/2023    09:41 12/5/2023    13:13   Common Labs   Glucose 140  101    BUN 19  20    Creatinine 1.04  1.02    Sodium 141  141    Potassium 4.2  4.1    Chloride 101  101    Calcium 8.8  9.6    Total Protein 6.8  6.9    Albumin 4.2  4.4    Total Bilirubin 0.4  0.5    Alkaline Phosphatase 55  61    AST (SGOT) 14  17    ALT (SGPT) 13  15    WBC 6.94  7.81    Hemoglobin 16.0  16.3    Hematocrit 47.3  48.0    Platelets 222  235    Total Cholesterol 183  211    Triglycerides 77  105    HDL Cholesterol 63  59    LDL Cholesterol  106  133    Hemoglobin A1C 5.90  6.10    Microalbumin, Urine  19.4                 Assessment and Plan   Diagnoses and all orders for this visit:    1. Medicare annual wellness visit, subsequent (Primary)    2. Well adult exam    3. Essential hypertension  -     metoprolol succinate XL (Toprol XL) 50 MG 24 hr tablet; Take 1 tablet by mouth Daily.  Dispense: 90 tablet; Refill: 3  -     amLODIPine (NORVASC) 10 MG tablet; Take 1 tablet by mouth Daily.  Dispense: 90 tablet; Refill: 3    Other orders  -     Fluzone High-Dose 65+yrs (2505-4520)  -     traZODone (DESYREL) 50 MG tablet; Take 1 tablet by mouth Every Night.  Dispense: 90 tablet; Refill: 3  -     losartan-hydrochlorothiazide (HYZAAR) 50-12.5 MG per tablet; Take 1 tablet by mouth Daily.  Dispense: 90 tablet; Refill: 3    Discussed importance of preventative care including vaccinations, age appropriate cancer screening, routine lab work, healthy diet, and active lifestyle.  HTN.  Not optimal.  The patient is  instructed to monitor at home and call in checks.  Discussed to ensure that he is taking all prescribed medication.           Follow Up   Return in about 6 months (around 6/12/2024).  Patient was given instructions and counseling regarding his condition or for health maintenance advice. Please see specific information pulled into the AVS if appropriate.

## 2023-12-12 NOTE — PATIENT INSTRUCTIONS
Medicare Wellness  Personal Prevention Plan of Service     Date of Office Visit:    Encounter Provider:  Karlie Hart MD  Place of Service:  [facility]  Patient Name: Calos Lam  :  1940    As part of the Medicare Wellness portion of your visit today, we are providing you with this personalized preventive plan of services (PPPS). This plan is based upon recommendations of the [place] Preventive Services Task Force (USPSTF) and the Advisory Committee on Immunization Practices (ACIP).    This lists the preventive care services that should be considered, and provides dates of when you are due. Items listed as completed are up-to-date and do not require any further intervention.    Health Maintenance   Topic Date Due    DIABETIC EYE EXAM  2023    INFLUENZA VACCINE  2023    ANNUAL WELLNESS VISIT  2023    COVID-19 Vaccine ( - -24 season) 2023    TDAP/TD VACCINES (1 - Tdap) 2024 (Originally 2014)    HEMOGLOBIN A1C  2024    LIPID PANEL  2024    URINE MICROALBUMIN  2024    Pneumococcal Vaccine 65+  Completed    ZOSTER VACCINE  Discontinued    COLORECTAL CANCER SCREENING  Discontinued       No orders of the defined types were placed in this encounter.      No follow-ups on file.

## 2024-01-05 ENCOUNTER — TELEPHONE (OUTPATIENT)
Dept: INTERNAL MEDICINE | Facility: CLINIC | Age: 84
End: 2024-01-05
Payer: MEDICARE

## 2024-01-05 DIAGNOSIS — I10 ESSENTIAL HYPERTENSION: ICD-10-CM

## 2024-01-05 RX ORDER — METOPROLOL SUCCINATE 100 MG/1
100 TABLET, EXTENDED RELEASE ORAL DAILY
Qty: 90 TABLET | Refills: 3 | Status: SHIPPED | OUTPATIENT
Start: 2024-01-05

## 2024-01-05 NOTE — TELEPHONE ENCOUNTER
Pts wife calling in regards to pts blood pressure- She states last night pts blood pressure was 200/110 and this was after already giving him his blood pressure medication- Pts wife states she gave him another blood pressure pill and when she checked his blood pressure earlier this morning it was 166/93- Pts wife would like a call back from [facility] to discuss please.      Best call back number for Pts wife, Linda  # [phone]

## 2024-02-07 ENCOUNTER — TELEPHONE (OUTPATIENT)
Dept: INTERNAL MEDICINE | Facility: CLINIC | Age: 84
End: 2024-02-07
Payer: MEDICARE

## 2024-02-07 NOTE — TELEPHONE ENCOUNTER
Caller: [employer]    Relationship: NOT ON  [facility]    Best call back number: [phone]     What is the best time to reach you: 8 AM-7 PM    Who are you requesting to speak with (clinical staff, provider,  specific staff member):         What was the call regarding: [employer] HAS FAXED OVER A LETTER OF NECESSITY FOR MEDICAL SUPPLIES ON 2-5-24 AT 3:30 FOR AN EXTERNAL CATHETER.    THEY WERE JUST WANTING TO MAKE SURE THE FAX HAD BEEN RECEIVED.

## 2024-02-28 RX ORDER — RIVASTIGMINE 13.3 MG/24H
PATCH, EXTENDED RELEASE TRANSDERMAL
Qty: 90 PATCH | Refills: 3 | Status: SHIPPED | OUTPATIENT
Start: 2024-02-28

## 2024-03-04 NOTE — TELEPHONE ENCOUNTER
Ok for HUB to read:    Per Stacy Hart MA - patient does not use EXTERNAL CATHETER from [employer] and this is why the order is not signed and has not been faxed.    FYI we have received the fax but patient does not need it.

## 2024-03-15 ENCOUNTER — TELEPHONE (OUTPATIENT)
Dept: INTERNAL MEDICINE | Facility: CLINIC | Age: 84
End: 2024-03-15
Payer: MEDICARE

## 2024-03-15 NOTE — TELEPHONE ENCOUNTER
Provider: DR DOS SANTOS    Caller: SANTY    Relationship to Patient: [facility]        Phone Number: [phone]    Reason for Call: FAXED A LETTER OF MEDICAL NECESSITY FOR THE 4TH TIME ON 03/04/2024.  NEEDS TO KNOW IF DR DOS SANTOS RECEIVED THE LETTER.  SHE STATES SHE IS GOING TO FAX AGAIN.    PLEASE ADVISE.

## 2024-03-18 ENCOUNTER — TELEPHONE (OUTPATIENT)
Dept: INTERNAL MEDICINE | Facility: CLINIC | Age: 84
End: 2024-03-18
Payer: MEDICARE

## 2024-03-18 NOTE — TELEPHONE ENCOUNTER
Caller: Linda Lam    Relationship: Emergency Contact    Best call back number: [phone]     What was the call regarding: PATIENTS WIFE STATED THAT SHE RECEIVED PAPERWORK REGARDING RENEWING PATIENTS DRIVERS LICENSE. DOES PATIENT NEED AN APPOINTMENT FOR DR. DOS SANTOS TO FILL OUT THE PAPERWORK OR CAN PATIENTS WIFE DROP OFF THE PAPERWORK IN THE OFFICE WITHOUT NEEDING AN APPOINTMENT     PLEASE ADVISE

## 2024-03-25 ENCOUNTER — OFFICE VISIT (OUTPATIENT)
Dept: INTERNAL MEDICINE | Facility: CLINIC | Age: 84
End: 2024-03-25
Payer: MEDICARE

## 2024-03-25 VITALS
SYSTOLIC BLOOD PRESSURE: 110 MMHG | OXYGEN SATURATION: 98 % | DIASTOLIC BLOOD PRESSURE: 74 MMHG | HEIGHT: 70 IN | BODY MASS INDEX: 21.33 KG/M2 | HEART RATE: 53 BPM | WEIGHT: 149 LBS

## 2024-03-25 DIAGNOSIS — F02.80 FRONTOTEMPORAL DEMENTIA: Primary | ICD-10-CM

## 2024-03-25 DIAGNOSIS — G31.09 FRONTOTEMPORAL DEMENTIA: Primary | ICD-10-CM

## 2024-03-25 DIAGNOSIS — N40.0 BENIGN PROSTATIC HYPERPLASIA, UNSPECIFIED WHETHER LOWER URINARY TRACT SYMPTOMS PRESENT: ICD-10-CM

## 2024-03-25 DIAGNOSIS — Z02.4 ENCOUNTER FOR EXAMINATION FOR DRIVING LICENSE: ICD-10-CM

## 2024-03-25 RX ORDER — TAMSULOSIN HYDROCHLORIDE 0.4 MG/1
1 CAPSULE ORAL DAILY
Qty: 90 CAPSULE | Refills: 3 | Status: SHIPPED | OUTPATIENT
Start: 2024-03-25

## 2024-03-26 PROBLEM — N40.0 BPH (BENIGN PROSTATIC HYPERPLASIA): Status: ACTIVE | Noted: 2024-03-26

## 2024-03-26 NOTE — PROGRESS NOTES
Subjective     Calos Lam is a 83 y.o. male who presents with   Chief Complaint   Patient presents with    Dementia       Dementia         Patient presents for driving evaluation forms.  He continues to limit driving to non-interstate.  It has been three years since last evaluation.      Review of Systems    The following portions of the patient's history were reviewed and updated as appropriate: allergies, current medications and problem list.    Patient Active Problem List    Diagnosis Date Noted    BPH (benign prostatic hyperplasia) 03/26/2024    B12 deficiency 09/11/2019     Note Last Updated: 9/16/2019     IF is normal.  Recommend oral supplement.        Vitamin D deficiency 03/07/2019    Incomplete RBBB 03/07/2019    PLMD (periodic limb movement disorder) 02/10/2018    History of bladder cancer 02/18/2017     Note Last Updated: 8/29/2022 2009 history of bladder cancer treated with BCG      RLS (restless legs syndrome) 02/18/2017    Type 2 diabetes mellitus without complication 02/15/2017    Essential hypertension     Mixed hyperlipidemia     Frontotemporal dementia 01/18/2017    Sleep-related hypoventilation/hypoxia  12/11/2016       Current Outpatient Medications on File Prior to Visit   Medication Sig Dispense Refill    amLODIPine (NORVASC) 10 MG tablet Take 1 tablet by mouth Daily. 90 tablet 3    losartan-hydrochlorothiazide (HYZAAR) 50-12.5 MG per tablet Take 1 tablet by mouth Daily. 90 tablet 3    metoprolol succinate XL (Toprol XL) 100 MG 24 hr tablet Take 1 tablet by mouth Daily. 90 tablet 3    pramipexole (MIRAPEX) 0.25 MG tablet Take 2 tablets by mouth Every Night. 60 tablet 3    pravastatin (PRAVACHOL) 40 MG tablet TAKE 1 TABLET EVERY DAY 90 tablet 1    rivastigmine (EXELON) 13.3 MG/24HR patch PLACE 1 PATCH ON THE SKIN DAILY AS DIRECTED BY PROVIDER . 90 patch 3    solifenacin (VESICARE) 10 MG tablet Vesicare 10 mg tablet   Take 1 tablet every day by oral route.      traZODone (DESYREL) 50 MG  "tablet Take 1 tablet by mouth Every Night. 90 tablet 3    vitamin B-12 (CYANOCOBALAMIN) 1000 MCG tablet TAKE 1 TABLET EVERY DAY 90 tablet 2    vitamin D (ERGOCALCIFEROL) 1.25 MG (74202 UT) capsule capsule TAKE 1 CAPSULE EVERY 7 DAYS 12 capsule 10     No current facility-administered medications on file prior to visit.       Objective     /74   Pulse 53   Ht 177.8 cm (70\")   Wt 67.6 kg (149 lb)   SpO2 98%   BMI 21.38 kg/m²     Physical Exam  Constitutional:       Appearance: He is well-developed.   HENT:      Head: Atraumatic.   Pulmonary:      Effort: Pulmonary effort is normal.   Neurological:      Mental Status: He is alert and oriented to person, place, and time.         Assessment & Plan   Diagnoses and all orders for this visit:    1. Frontotemporal dementia (Primary)    2. Encounter for examination for driving license  -     Ambulatory Referral to Physical Medicine Rehab    3. Benign prostatic hyperplasia, unspecified whether lower urinary tract symptoms present    Other orders  -     tamsulosin (FLOMAX) 0.4 MG capsule 24 hr capsule; Take 1 capsule by mouth Daily.  Dispense: 90 capsule; Refill: 3        Discussion    Patient presents in f/u of frontotemporal dementia and needs driving assessment.  I will fill out forms to continue to limit driving to non-interstate.  [facility]  He and wife are agreeable to this.    BPH.  Control is stable.  The patient is advised to continue current dosage of Flomax.  Refill is provided.           Future Appointments   Date Time Provider Department Center   5/14/2024  3:00 PM Presley Kyle APRN [facility]   6/5/2024  1:10 PM [facility]   6/12/2024  1:15 PM Karlie Hart MD [facility]   12/11/2024  1:10 PM [facility]   12/18/2024  1:30 PM Karlie Hart MD [facility]         "

## 2024-04-09 ENCOUNTER — TELEPHONE (OUTPATIENT)
Dept: INTERNAL MEDICINE | Facility: CLINIC | Age: 84
End: 2024-04-09
Payer: MEDICARE

## 2024-04-09 NOTE — TELEPHONE ENCOUNTER
Patients wife called with patients blood pressure readings. Patients BP is 190/86 Pulse 51. Has been running that high for a couple of days.     PLEASE ADVISE

## 2024-04-09 NOTE — TELEPHONE ENCOUNTER
Call and advise. Take an extra losartan HCT this eventing.  I would like to see in morning at 8:45 overbook.  Bring all meds and also bring glasses for needed eye test.  JUANITA

## 2024-04-10 ENCOUNTER — OFFICE VISIT (OUTPATIENT)
Dept: INTERNAL MEDICINE | Facility: CLINIC | Age: 84
End: 2024-04-10
Payer: MEDICARE

## 2024-04-10 VITALS
DIASTOLIC BLOOD PRESSURE: 84 MMHG | HEART RATE: 49 BPM | BODY MASS INDEX: 21.33 KG/M2 | HEIGHT: 70 IN | OXYGEN SATURATION: 98 % | WEIGHT: 149 LBS | SYSTOLIC BLOOD PRESSURE: 124 MMHG

## 2024-04-10 DIAGNOSIS — I10 ESSENTIAL HYPERTENSION: Primary | ICD-10-CM

## 2024-04-10 PROCEDURE — 1159F MED LIST DOCD IN RCRD: CPT | Performed by: INTERNAL MEDICINE

## 2024-04-10 PROCEDURE — 3074F SYST BP LT 130 MM HG: CPT | Performed by: INTERNAL MEDICINE

## 2024-04-10 PROCEDURE — 99213 OFFICE O/P EST LOW 20 MIN: CPT | Performed by: INTERNAL MEDICINE

## 2024-04-10 PROCEDURE — 3079F DIAST BP 80-89 MM HG: CPT | Performed by: INTERNAL MEDICINE

## 2024-04-10 PROCEDURE — 1160F RVW MEDS BY RX/DR IN RCRD: CPT | Performed by: INTERNAL MEDICINE

## 2024-04-10 RX ORDER — LOSARTAN POTASSIUM AND HYDROCHLOROTHIAZIDE 25; 100 MG/1; MG/1
1 TABLET ORAL DAILY
Qty: 90 TABLET | Refills: 3 | Status: SHIPPED | OUTPATIENT
Start: 2024-04-10

## 2024-04-10 NOTE — PROGRESS NOTES
Subjective     Calos Lam is a 83 y.o. male who presents with   Chief Complaint   Patient presents with    Hypertension       Hypertension         Poor control at home.  It is variable.  Highest 220 systolic.      Review of Systems    The following portions of the patient's history were reviewed and updated as appropriate: allergies, current medications and problem list.    Patient Active Problem List    Diagnosis Date Noted    BPH (benign prostatic hyperplasia) 03/26/2024    B12 deficiency 09/11/2019     Note Last Updated: 9/16/2019     IF is normal.  Recommend oral supplement.        Vitamin D deficiency 03/07/2019    Incomplete RBBB 03/07/2019    PLMD (periodic limb movement disorder) 02/10/2018    History of bladder cancer 02/18/2017     Note Last Updated: 8/29/2022 2009 history of bladder cancer treated with BCG      RLS (restless legs syndrome) 02/18/2017    Type 2 diabetes mellitus without complication 02/15/2017    Essential hypertension     Mixed hyperlipidemia     Frontotemporal dementia 01/18/2017    Sleep-related hypoventilation/hypoxia  12/11/2016       Current Outpatient Medications on File Prior to Visit   Medication Sig Dispense Refill    amLODIPine (NORVASC) 10 MG tablet Take 1 tablet by mouth Daily. 90 tablet 3    metoprolol succinate XL (Toprol XL) 100 MG 24 hr tablet Take 1 tablet by mouth Daily. 90 tablet 3    multivitamin with minerals (MULTIVITAMIN MEN PO) Take 1 tablet by mouth Daily.      pravastatin (PRAVACHOL) 40 MG tablet TAKE 1 TABLET EVERY DAY 90 tablet 1    rivastigmine (EXELON) 13.3 MG/24HR patch PLACE 1 PATCH ON THE SKIN DAILY AS DIRECTED BY PROVIDER . 90 patch 3    tamsulosin (FLOMAX) 0.4 MG capsule 24 hr capsule Take 1 capsule by mouth Daily. 90 capsule 3    traZODone (DESYREL) 50 MG tablet Take 1 tablet by mouth Every Night. 90 tablet 3    vitamin B-12 (CYANOCOBALAMIN) 1000 MCG tablet TAKE 1 TABLET EVERY DAY 90 tablet 2    vitamin D (ERGOCALCIFEROL) 1.25 MG (29109 UT)  "capsule capsule TAKE 1 CAPSULE EVERY 7 DAYS 12 capsule 10    [DISCONTINUED] losartan-hydrochlorothiazide (HYZAAR) 50-12.5 MG per tablet Take 1 tablet by mouth Daily. 90 tablet 3    [DISCONTINUED] pramipexole (MIRAPEX) 0.25 MG tablet Take 2 tablets by mouth Every Night. (Patient not taking: Reported on 4/10/2024) 60 tablet 3    [DISCONTINUED] solifenacin (VESICARE) 10 MG tablet Vesicare 10 mg tablet   Take 1 tablet every day by oral route. (Patient not taking: Reported on 4/10/2024)       No current facility-administered medications on file prior to visit.       Objective     /84   Pulse (!) 49   Ht 177.8 cm (70\")   Wt 67.6 kg (149 lb)   SpO2 98%   BMI 21.38 kg/m²     Physical Exam  Constitutional:       Appearance: He is well-developed.   HENT:      Head: Atraumatic.   Pulmonary:      Effort: Pulmonary effort is normal.   Neurological:      Mental Status: He is alert and oriented to person, place, and time.         Assessment & Plan   Diagnoses and all orders for this visit:    1. Essential hypertension (Primary)    Other orders  -     losartan-hydrochlorothiazide (Hyzaar) 100-25 MG per tablet; Take 1 tablet by mouth Daily.  Dispense: 90 tablet; Refill: 3        Discussion    HTN.  Poor control.  Increase losartan HCT to 100/25.  The patient is instructed to monitor at home and call in checks.           Future Appointments   Date Time Provider Department Center   5/14/2024  3:00 PM Presley Kyle APRN [facility]   6/5/2024  1:10 PM [facility]   6/12/2024  1:15 PM Karlie Hart MD [facility]   12/11/2024  1:10 PM [facility]   12/18/2024  1:30 PM Karlie Hart MD [facility]         "

## 2024-04-15 RX ORDER — PRAVASTATIN SODIUM 40 MG
40 TABLET ORAL DAILY
Qty: 90 TABLET | Refills: 1 | Status: SHIPPED | OUTPATIENT
Start: 2024-04-15 | End: 2024-04-16

## 2024-04-15 NOTE — TELEPHONE ENCOUNTER
Caller: Linda Lam    Relationship: Emergency Contact    Best call back number: [phone]    Requested Prescriptions:   Requested Prescriptions     Pending Prescriptions Disp Refills    pravastatin (PRAVACHOL) 40 MG tablet 90 tablet 1     Sig: Take 1 tablet by mouth Daily.        Pharmacy where request should be sent: [id number] - [place], [place] - [address] [phone] [facility] [phone]      Last office visit with prescribing clinician: 4/10/2024   Last telemedicine visit with prescribing clinician: Visit date not found   Next office visit with prescribing clinician: 4/23/2024     Additional details provided by patient: PATIENTS SPOUSE STATES THE PATIENT IS COMPLETELY OUT OF THIS MEDICATION AND IS NEEDING AN EMERGENCY REFILL DUE TO THE MAIL DELIVERY PHARMACY SENDING OUT THE MEDICATION TODAY AND IT WILL TAKE A COUPLE OF DAYS BEFORE THE MEDICATION IS DELIVERED.     Does the patient have less than a 3 day supply:  [x] Yes  [] No    Would you like a call back once the refill request has been completed: [x] Yes [] No    If the office needs to give you a call back, can they leave a voicemail: [x] Yes [] No    Lucia Mcdaniels, ANTONI   04/15/24 14:01 EDT

## 2024-04-16 RX ORDER — PRAVASTATIN SODIUM 40 MG
40 TABLET ORAL DAILY
Qty: 90 TABLET | Refills: 3 | Status: SHIPPED | OUTPATIENT
Start: 2024-04-16

## 2024-04-22 ENCOUNTER — TELEPHONE (OUTPATIENT)
Dept: INTERNAL MEDICINE | Facility: CLINIC | Age: 84
End: 2024-04-22
Payer: MEDICARE

## 2024-04-23 ENCOUNTER — OFFICE VISIT (OUTPATIENT)
Dept: INTERNAL MEDICINE | Facility: CLINIC | Age: 84
End: 2024-04-23
Payer: MEDICARE

## 2024-04-23 VITALS
BODY MASS INDEX: 21.33 KG/M2 | DIASTOLIC BLOOD PRESSURE: 62 MMHG | OXYGEN SATURATION: 98 % | SYSTOLIC BLOOD PRESSURE: 110 MMHG | HEART RATE: 53 BPM | WEIGHT: 149 LBS | HEIGHT: 70 IN

## 2024-04-23 DIAGNOSIS — I10 ESSENTIAL HYPERTENSION: Primary | ICD-10-CM

## 2024-04-23 PROCEDURE — 3074F SYST BP LT 130 MM HG: CPT | Performed by: INTERNAL MEDICINE

## 2024-04-23 PROCEDURE — 3078F DIAST BP <80 MM HG: CPT | Performed by: INTERNAL MEDICINE

## 2024-04-23 PROCEDURE — 99213 OFFICE O/P EST LOW 20 MIN: CPT | Performed by: INTERNAL MEDICINE

## 2024-04-23 NOTE — PROGRESS NOTES
Subjective     Calos Lam is a 83 y.o. male who presents with   Chief Complaint   Patient presents with    Hypertension       Hypertension         HTN.  Control is improved.      Review of Systems   Respiratory: Negative.     Cardiovascular: Negative.        The following portions of the patient's history were reviewed and updated as appropriate: allergies, current medications and problem list.    Patient Active Problem List    Diagnosis Date Noted    BPH (benign prostatic hyperplasia) 03/26/2024    B12 deficiency 09/11/2019     Note Last Updated: 9/16/2019     IF is normal.  Recommend oral supplement.        Vitamin D deficiency 03/07/2019    Incomplete RBBB 03/07/2019    PLMD (periodic limb movement disorder) 02/10/2018    History of bladder cancer 02/18/2017     Note Last Updated: 8/29/2022 2009 history of bladder cancer treated with BCG      RLS (restless legs syndrome) 02/18/2017    Type 2 diabetes mellitus without complication 02/15/2017    Essential hypertension     Mixed hyperlipidemia     Frontotemporal dementia 01/18/2017    Sleep-related hypoventilation/hypoxia  12/11/2016       Current Outpatient Medications on File Prior to Visit   Medication Sig Dispense Refill    amLODIPine (NORVASC) 10 MG tablet Take 1 tablet by mouth Daily. 90 tablet 3    losartan-hydrochlorothiazide (Hyzaar) 100-25 MG per tablet Take 1 tablet by mouth Daily. 90 tablet 3    metoprolol succinate XL (Toprol XL) 100 MG 24 hr tablet Take 1 tablet by mouth Daily. 90 tablet 3    multivitamin with minerals (MULTIVITAMIN MEN PO) Take 1 tablet by mouth Daily.      pravastatin (PRAVACHOL) 40 MG tablet TAKE 1 TABLET EVERY DAY 90 tablet 3    rivastigmine (EXELON) 13.3 MG/24HR patch PLACE 1 PATCH ON THE SKIN DAILY AS DIRECTED BY PROVIDER . 90 patch 3    tamsulosin (FLOMAX) 0.4 MG capsule 24 hr capsule Take 1 capsule by mouth Daily. 90 capsule 3    traZODone (DESYREL) 50 MG tablet Take 1 tablet by mouth Every Night. 90 tablet 3     "vitamin B-12 (CYANOCOBALAMIN) 1000 MCG tablet TAKE 1 TABLET EVERY DAY 90 tablet 2    vitamin D (ERGOCALCIFEROL) 1.25 MG (31684 UT) capsule capsule TAKE 1 CAPSULE EVERY 7 DAYS 12 capsule 10     No current facility-administered medications on file prior to visit.       Objective     /62   Pulse 53   Ht 177.8 cm (70\")   Wt 67.6 kg (149 lb)   SpO2 98%   BMI 21.38 kg/m²     Physical Exam  Constitutional:       Appearance: He is well-developed.   HENT:      Head: Atraumatic.   Pulmonary:      Effort: Pulmonary effort is normal.   Neurological:      Mental Status: He is alert and oriented to person, place, and time.         Assessment & Plan   Diagnoses and all orders for this visit:    1. Essential hypertension (Primary)  -     Basic Metabolic Panel        Discussion    HTN.  Control is good.  The patient is advised to continue current dosage of losartan HCT, metoprolol and amlodipine.           Future Appointments   Date Time Provider Department Center   5/14/2024  3:00 PM Presley Kyle APRN [facility]   6/5/2024  1:10 PM [facility]   6/12/2024  1:15 PM Karlie Hart MD [facility]   12/11/2024  1:10 PM [facility]   12/18/2024  1:30 PM Karlie Hart MD [facility]         "

## 2024-04-24 ENCOUNTER — TELEPHONE (OUTPATIENT)
Dept: INTERNAL MEDICINE | Facility: CLINIC | Age: 84
End: 2024-04-24
Payer: MEDICARE

## 2024-04-24 LAB
BUN SERPL-MCNC: 18 MG/DL (ref 8–23)
BUN/CREAT SERPL: 18.8 (ref 7–25)
CALCIUM SERPL-MCNC: 9.7 MG/DL (ref 8.6–10.5)
CHLORIDE SERPL-SCNC: 99 MMOL/L (ref 98–107)
CO2 SERPL-SCNC: 31.4 MMOL/L (ref 22–29)
CREAT SERPL-MCNC: 0.96 MG/DL (ref 0.76–1.27)
EGFRCR SERPLBLD CKD-EPI 2021: 77.5 ML/MIN/1.73
GLUCOSE SERPL-MCNC: 131 MG/DL (ref 65–99)
POTASSIUM SERPL-SCNC: 3.9 MMOL/L (ref 3.5–5.2)
SODIUM SERPL-SCNC: 139 MMOL/L (ref 136–145)

## 2024-05-14 ENCOUNTER — OFFICE VISIT (OUTPATIENT)
Dept: NEUROLOGY | Facility: CLINIC | Age: 84
End: 2024-05-14
Payer: MEDICARE

## 2024-05-14 VITALS
BODY MASS INDEX: 21.19 KG/M2 | HEART RATE: 50 BPM | WEIGHT: 148 LBS | SYSTOLIC BLOOD PRESSURE: 122 MMHG | HEIGHT: 70 IN | DIASTOLIC BLOOD PRESSURE: 62 MMHG | OXYGEN SATURATION: 98 %

## 2024-05-14 DIAGNOSIS — F02.80 FRONTOTEMPORAL DEMENTIA: Primary | ICD-10-CM

## 2024-05-14 DIAGNOSIS — G31.09 FRONTOTEMPORAL DEMENTIA: Primary | ICD-10-CM

## 2024-05-14 PROCEDURE — 3074F SYST BP LT 130 MM HG: CPT | Performed by: NURSE PRACTITIONER

## 2024-05-14 PROCEDURE — 1159F MED LIST DOCD IN RCRD: CPT | Performed by: NURSE PRACTITIONER

## 2024-05-14 PROCEDURE — 99213 OFFICE O/P EST LOW 20 MIN: CPT | Performed by: NURSE PRACTITIONER

## 2024-05-14 PROCEDURE — 1160F RVW MEDS BY RX/DR IN RCRD: CPT | Performed by: NURSE PRACTITIONER

## 2024-05-14 PROCEDURE — 3078F DIAST BP <80 MM HG: CPT | Performed by: NURSE PRACTITIONER

## 2024-05-14 NOTE — PROGRESS NOTES
Subjective   Calos Lam is a 83 y.o. male presenting for follow-up.  He has a history of frontotemporal dementia.  He would last seen 1 year ago.  He is accompanied by his wife.    His wife does report that he has declined some since last visit.  Overall he has had a fairly slow decline.  He remains very active both physically and mentally.  He continues to drive to the grocery store which is 2 blocks from their house.  He also drives to the library once a week.  He does not drive when it is dark or if the weather is bad.  He stays very active.  He likes to garden and walks in their neighborhood quite a bit.  He remains on Exelon patch 3.3 mg per 24 hours.  He is on vitamin B12 supplementation as well as vitamin D.  He has a history of sleep apnea that is treated with a mouthpiece.  His restless legs are treated with pramipexole.  He was previously a heavy drinker.  He no longer drinks.     Dementia  Pertinent negatives include no numbness or weakness.      Review of Systems   Neurological:  Positive for memory problem and confusion. Negative for dizziness, tremors, seizures, syncope, facial asymmetry, speech difficulty, weakness, light-headedness, numbness and headache.   Hematological:  Negative for adenopathy. Bruises/bleeds easily.   Psychiatric/Behavioral:  Positive for decreased concentration. Negative for sleep disturbance. The patient is not nervous/anxious.      I have reviewed and confirmed the accuracy of the patient's history: Chief complaint, HPI, ROS, Subjective, and Past Family Social History as entered by the MA/JOSE/RN. Beatriz Gavin MA 05/14/24      Objective     Physical Examination:  General Appearance: Thin elderly male in no acute distress.  HEENT: Normocephalic.    Neck and Spine: Normal range of motion.  Normal alignment. No mass or tenderness. No bruits.  Cardiac: Regular rate and rhythm. No murmurs.  Extremities: No edema or deformities. Normal joint ROM.  Skin: No rashes or birth  marks.      Neurological examination:   Higher Integrative Function: Oriented to person and place.  He scored an 11 out of 30 on the [place].  He was able to correctly draw a clock with the correct time.  He missed points for identifying a pattern, copying a cube, naming animals, repeating 3 numbers backwards, serial subtraction, repeating sentences, all 5 words for delayed recall, and did not know the date, month, or day of the week.    CN II: Pupils are equal, round and reactive to light. Normal visual acuity and visual fields.   CN III IV VI: Extraocular movements are full without nystagmus.   CN V: Normal facial sensation and strength of muscles of mastication.   CN VII: Facial movements are symmetric. No weakness.   CN VIII: Auditory acuity is normal.  CN IX & X: Symmetric palatal movement.   CN XI: Sternocleidomastoid and trapezius are normal. No weakness.   CN XII: The tongue is midline. No atrophy or fasciculations.  Motor: Normal muscle strength, bulk and tone in upper and lower extremities. No fasciculations, rigidity, spasticity, or abnormal movements.   Sensation: Normal light touch, pinprick, vibration, temperature, and proprioception in the arms and legs.   Station and Gait: Normal gait and station.          Assessment & Plan   Diagnoses and all orders for this visit:    1. Frontotemporal dementia (Primary)    He is doing well.  He continues to have a slow decline in his cognition. He remains physically and cognitively active.  We did discuss the importance of this as well as safe driving.  It is okay for him to continue to drive a couple of walks to the grocery store.  He is not driving if the weather is bad or at nighttime.  His restless legs and sleep apnea are both treated and well-controlled at this time.     Follow-up annually.

## 2024-05-21 ENCOUNTER — TELEPHONE (OUTPATIENT)
Dept: INTERNAL MEDICINE | Facility: CLINIC | Age: 84
End: 2024-05-21
Payer: MEDICARE

## 2024-05-21 NOTE — TELEPHONE ENCOUNTER
Call and advise.  I referred him for driving evaluation on 3.25.2024.  I stated in the noted I want him to do this evaluation.  Please let his wife know this as well.

## 2024-05-22 NOTE — TELEPHONE ENCOUNTER
He states he got a document saying it was not necessary. He will call and give information later he was not at home.

## 2024-06-05 DIAGNOSIS — E78.2 MIXED HYPERLIPIDEMIA: ICD-10-CM

## 2024-06-05 DIAGNOSIS — I10 ESSENTIAL HYPERTENSION: Primary | ICD-10-CM

## 2024-06-05 DIAGNOSIS — E11.9 TYPE 2 DIABETES MELLITUS WITHOUT COMPLICATION, UNSPECIFIED WHETHER LONG TERM INSULIN USE: ICD-10-CM

## 2024-06-06 LAB
ALBUMIN SERPL-MCNC: 3.8 G/DL (ref 3.5–5.2)
ALBUMIN/GLOB SERPL: 1.7 G/DL
ALP SERPL-CCNC: 47 U/L (ref 39–117)
ALT SERPL-CCNC: 11 U/L (ref 1–41)
AST SERPL-CCNC: 18 U/L (ref 1–40)
BASOPHILS # BLD AUTO: 0.03 10*3/MM3 (ref 0–0.2)
BASOPHILS NFR BLD AUTO: 0.4 % (ref 0–1.5)
BILIRUB SERPL-MCNC: 0.4 MG/DL (ref 0–1.2)
BUN SERPL-MCNC: 23 MG/DL (ref 8–23)
BUN/CREAT SERPL: 20.2 (ref 7–25)
CALCIUM SERPL-MCNC: 9.1 MG/DL (ref 8.6–10.5)
CHLORIDE SERPL-SCNC: 101 MMOL/L (ref 98–107)
CHOLEST SERPL-MCNC: 160 MG/DL (ref 0–200)
CO2 SERPL-SCNC: 29.7 MMOL/L (ref 22–29)
CREAT SERPL-MCNC: 1.14 MG/DL (ref 0.76–1.27)
EGFRCR SERPLBLD CKD-EPI 2021: 63 ML/MIN/1.73
EOSINOPHIL # BLD AUTO: 0.14 10*3/MM3 (ref 0–0.4)
EOSINOPHIL NFR BLD AUTO: 1.8 % (ref 0.3–6.2)
ERYTHROCYTE [DISTWIDTH] IN BLOOD BY AUTOMATED COUNT: 12.5 % (ref 12.3–15.4)
GLOBULIN SER CALC-MCNC: 2.2 GM/DL
GLUCOSE SERPL-MCNC: 207 MG/DL (ref 65–99)
HBA1C MFR BLD: 6.2 % (ref 4.8–5.6)
HCT VFR BLD AUTO: 42.1 % (ref 37.5–51)
HDLC SERPL-MCNC: 57 MG/DL (ref 40–60)
HGB BLD-MCNC: 13.9 G/DL (ref 13–17.7)
IMM GRANULOCYTES # BLD AUTO: 0.02 10*3/MM3 (ref 0–0.05)
IMM GRANULOCYTES NFR BLD AUTO: 0.3 % (ref 0–0.5)
LDLC SERPL CALC-MCNC: 79 MG/DL (ref 0–100)
LYMPHOCYTES # BLD AUTO: 1.36 10*3/MM3 (ref 0.7–3.1)
LYMPHOCYTES NFR BLD AUTO: 17.3 % (ref 19.6–45.3)
MCH RBC QN AUTO: 31.7 PG (ref 26.6–33)
MCHC RBC AUTO-ENTMCNC: 33 G/DL (ref 31.5–35.7)
MCV RBC AUTO: 96.1 FL (ref 79–97)
MONOCYTES # BLD AUTO: 0.39 10*3/MM3 (ref 0.1–0.9)
MONOCYTES NFR BLD AUTO: 5 % (ref 5–12)
NEUTROPHILS # BLD AUTO: 5.92 10*3/MM3 (ref 1.7–7)
NEUTROPHILS NFR BLD AUTO: 75.2 % (ref 42.7–76)
NRBC BLD AUTO-RTO: 0 /100 WBC (ref 0–0.2)
PLATELET # BLD AUTO: 217 10*3/MM3 (ref 140–450)
POTASSIUM SERPL-SCNC: 3.9 MMOL/L (ref 3.5–5.2)
PROT SERPL-MCNC: 6 G/DL (ref 6–8.5)
RBC # BLD AUTO: 4.38 10*6/MM3 (ref 4.14–5.8)
SODIUM SERPL-SCNC: 140 MMOL/L (ref 136–145)
TRIGL SERPL-MCNC: 137 MG/DL (ref 0–150)
VLDLC SERPL CALC-MCNC: 24 MG/DL (ref 5–40)
WBC # BLD AUTO: 7.86 10*3/MM3 (ref 3.4–10.8)

## 2024-06-12 ENCOUNTER — OFFICE VISIT (OUTPATIENT)
Dept: INTERNAL MEDICINE | Facility: CLINIC | Age: 84
End: 2024-06-12
Payer: MEDICARE

## 2024-06-12 VITALS
OXYGEN SATURATION: 98 % | WEIGHT: 148 LBS | HEART RATE: 53 BPM | BODY MASS INDEX: 21.19 KG/M2 | HEIGHT: 70 IN | DIASTOLIC BLOOD PRESSURE: 80 MMHG | SYSTOLIC BLOOD PRESSURE: 120 MMHG

## 2024-06-12 DIAGNOSIS — G31.09 FRONTOTEMPORAL DEMENTIA: ICD-10-CM

## 2024-06-12 DIAGNOSIS — E11.9 TYPE 2 DIABETES MELLITUS WITHOUT COMPLICATION, WITHOUT LONG-TERM CURRENT USE OF INSULIN: Primary | ICD-10-CM

## 2024-06-12 DIAGNOSIS — R05.3 CHRONIC COUGH: ICD-10-CM

## 2024-06-12 DIAGNOSIS — I10 ESSENTIAL HYPERTENSION: ICD-10-CM

## 2024-06-12 DIAGNOSIS — F02.80 FRONTOTEMPORAL DEMENTIA: ICD-10-CM

## 2024-06-12 DIAGNOSIS — E78.2 MIXED HYPERLIPIDEMIA: ICD-10-CM

## 2024-06-12 PROCEDURE — 3079F DIAST BP 80-89 MM HG: CPT | Performed by: INTERNAL MEDICINE

## 2024-06-12 PROCEDURE — 3074F SYST BP LT 130 MM HG: CPT | Performed by: INTERNAL MEDICINE

## 2024-06-12 PROCEDURE — 1126F AMNT PAIN NOTED NONE PRSNT: CPT | Performed by: INTERNAL MEDICINE

## 2024-06-12 PROCEDURE — G2211 COMPLEX E/M VISIT ADD ON: HCPCS | Performed by: INTERNAL MEDICINE

## 2024-06-12 PROCEDURE — 1159F MED LIST DOCD IN RCRD: CPT | Performed by: INTERNAL MEDICINE

## 2024-06-12 PROCEDURE — 1160F RVW MEDS BY RX/DR IN RCRD: CPT | Performed by: INTERNAL MEDICINE

## 2024-06-12 PROCEDURE — 71046 X-RAY EXAM CHEST 2 VIEWS: CPT | Performed by: INTERNAL MEDICINE

## 2024-06-12 PROCEDURE — 99214 OFFICE O/P EST MOD 30 MIN: CPT | Performed by: INTERNAL MEDICINE

## 2024-06-12 NOTE — PROGRESS NOTES
Subjective     Calos Lam is a 83 y.o. male who presents with   Chief Complaint   Patient presents with    Diabetes    Hypertension    Hyperlipidemia       History of Present Illness     The following data was reviewed by: Karlie Hart MD on 06/12/2024:  Common labs          12/5/2023    13:13 4/23/2024    14:37 6/5/2024    13:11   Common Labs   Glucose 101  131  207    BUN 20  18  23    Creatinine 1.02  0.96  1.14    Sodium 141  139  140    Potassium 4.1  3.9  3.9    Chloride 101  99  101    Calcium 9.6  9.7  9.1    Total Protein 6.9   6.0    Albumin 4.4   3.8    Total Bilirubin 0.5   0.4    Alkaline Phosphatase 61   47    AST (SGOT) 17   18    ALT (SGPT) 15   11    WBC 7.81   7.86    Hemoglobin 16.3   13.9    Hematocrit 48.0   42.1    Platelets 235   217    Total Cholesterol 211   160    Triglycerides 105   137    HDL Cholesterol 59   57    LDL Cholesterol  133   79    Hemoglobin A1C 6.10   6.20    Microalbumin, Urine 19.4        DM-2.  Hgb A1c is at goal.   HTN.  Blood pressure is under good control.  HLD.  LDL cholesterol is under good control.      C/o cough.  Occasional.  No SOA/wheeze.  No fever.      Discussed again that requirement of me filling out driving form was that a [employer] [facility] driving evaluation be completed.  He states he will do it.     Review of Systems   Respiratory:  Positive for cough. Negative for shortness of breath and wheezing.    Cardiovascular:  Negative for chest pain.       The following portions of the patient's history were reviewed and updated as appropriate: allergies, current medications and problem list.    Patient Active Problem List    Diagnosis Date Noted    BPH (benign prostatic hyperplasia) 03/26/2024    B12 deficiency 09/11/2019     Note Last Updated: 9/16/2019     IF is normal.  Recommend oral supplement.        Vitamin D deficiency 03/07/2019    Incomplete RBBB 03/07/2019    PLMD (periodic limb movement disorder) 02/10/2018    History of bladder cancer  "02/18/2017     Note Last Updated: 8/29/2022 2009 history of bladder cancer treated with BCG      RLS (restless legs syndrome) 02/18/2017    Type 2 diabetes mellitus without complication 02/15/2017    Essential hypertension     Mixed hyperlipidemia     Frontotemporal dementia 01/18/2017    Sleep-related hypoventilation/hypoxia  12/11/2016       Current Outpatient Medications on File Prior to Visit   Medication Sig Dispense Refill    amLODIPine (NORVASC) 10 MG tablet Take 1 tablet by mouth Daily. 90 tablet 3    losartan-hydrochlorothiazide (Hyzaar) 100-25 MG per tablet Take 1 tablet by mouth Daily. 90 tablet 3    metoprolol succinate XL (Toprol XL) 100 MG 24 hr tablet Take 1 tablet by mouth Daily. 90 tablet 3    multivitamin with minerals (MULTIVITAMIN MEN PO) Take 1 tablet by mouth Daily.      pravastatin (PRAVACHOL) 40 MG tablet TAKE 1 TABLET EVERY DAY 90 tablet 3    rivastigmine (EXELON) 13.3 MG/24HR patch PLACE 1 PATCH ON THE SKIN DAILY AS DIRECTED BY PROVIDER . 90 patch 3    tamsulosin (FLOMAX) 0.4 MG capsule 24 hr capsule Take 1 capsule by mouth Daily. 90 capsule 3    traZODone (DESYREL) 50 MG tablet Take 1 tablet by mouth Every Night. 90 tablet 3    vitamin B-12 (CYANOCOBALAMIN) 1000 MCG tablet TAKE 1 TABLET EVERY DAY 90 tablet 2    vitamin D (ERGOCALCIFEROL) 1.25 MG (58386 UT) capsule capsule TAKE 1 CAPSULE EVERY 7 DAYS 12 capsule 10     No current facility-administered medications on file prior to visit.       Objective     /80   Pulse 53   Ht 177.8 cm (70\")   Wt 67.1 kg (148 lb)   SpO2 98%   BMI 21.24 kg/m²     Physical Exam  Constitutional:       Appearance: He is well-developed.   HENT:      Head: Atraumatic.   Cardiovascular:      Rate and Rhythm: Normal rate and regular rhythm.      Heart sounds: Normal heart sounds.   Pulmonary:      Effort: Pulmonary effort is normal.      Breath sounds: Normal breath sounds. Examination of the right-lower field reveals rales. Examination of the " left-lower field reveals rales.   Skin:     General: Skin is warm and dry.   Neurological:      Mental Status: He is alert and oriented to person, place, and time.     X-rays of the chest  performed today for following indication:   cough.  X-ray reveal nad.  There is no available x-ray for comparison.  X-ray sent to radiology for official interpretation and findings.        Assessment & Plan   Diagnoses and all orders for this visit:    1. Type 2 diabetes mellitus without complication, without long-term current use of insulin (Primary)    2. Essential hypertension    3. Mixed hyperlipidemia    4. Chronic cough  -     XR Chest PA & Lateral    5. Frontotemporal dementia  -     Ambulatory Referral to Physical Medicine Rehab        Discussion    Dm-2.  Control is good.  The patient is advised to continue current healthy diet.    HTN. Control is good.  The patient is advised to continue current dosage of metoprolol, amlodipine, losartan HCT.    HLD.  Control is good.  The patient is advised to continue current dosage of pravastatin.    Frontotemporal dementia.  Stable.  Needs [facility] Evaluation.  Referral is placed.    Chronic occasional cough.  CXR looks unremarkable.  F/u on radiology reading.        Future Appointments   Date Time Provider Department Center   12/11/2024  1:10 PM [facility]   12/18/2024  1:30 PM Karlie Hart MD [facility]   5/15/2025  3:00 PM Presley Kyle APRN [facility]

## 2024-06-13 DIAGNOSIS — R93.89 ABNORMAL CHEST X-RAY: Primary | ICD-10-CM

## 2024-07-02 ENCOUNTER — HOSPITAL ENCOUNTER (OUTPATIENT)
Dept: CT IMAGING | Facility: HOSPITAL | Age: 84
Discharge: HOME | End: 2024-07-02
Admitting: INTERNAL MEDICINE
Payer: MEDICARE

## 2024-07-02 PROCEDURE — 71250 CT THORAX DX C-: CPT

## 2024-07-11 ENCOUNTER — TELEPHONE (OUTPATIENT)
Dept: INTERNAL MEDICINE | Facility: CLINIC | Age: 84
End: 2024-07-11

## 2024-07-11 NOTE — TELEPHONE ENCOUNTER
Caller: Linda Lam    Relationship: Emergency Contact    Best call back number: [phone]     What orders are you requesting (i.e. lab or imaging): DRIVING TEST    Where will you receive your lab/imaging services: [facility]  PHONE: [phone]    Additional notes: PATIENT'S WIFE STATES THAT DR DOS SANTOS HAD ASKED THAT HE TAKE A DRIVING TEST AT [facility], BUT THEY HAD NOT RECEIVED AN ORDER. REQUESTS TO HAVE ORDER SENT IN. PLEASE CALL TO CONFIRM

## 2024-07-12 DIAGNOSIS — J84.9 INTERSTITIAL LUNG DISEASE: Primary | ICD-10-CM

## 2024-07-12 NOTE — TELEPHONE ENCOUNTER
Faxed to [facility]  Assessment at [phone] and they will call the patient to schedule appointment. Their number is [phone] or [phone].

## 2024-07-23 ENCOUNTER — TELEPHONE (OUTPATIENT)
Dept: INTERNAL MEDICINE | Facility: CLINIC | Age: 84
End: 2024-07-23
Payer: MEDICARE

## 2024-07-23 NOTE — TELEPHONE ENCOUNTER
Caller: Linda Lam    Relationship: Emergency Contact    Best call back number: [phone]         Who are you requesting to speak with (clinical staff, provider,  specific staff member):         What was the call regarding: PLEASE RETURN CALL NEED TO SPEAK WITH YOU ABOUT PULMONOLOGIST.

## 2024-08-05 ENCOUNTER — TRANSCRIBE ORDERS (OUTPATIENT)
Dept: ADMINISTRATIVE | Facility: HOSPITAL | Age: 84
End: 2024-08-05
Payer: MEDICARE

## 2024-08-05 DIAGNOSIS — J84.9 ILD (INTERSTITIAL LUNG DISEASE): Primary | ICD-10-CM

## 2024-08-20 ENCOUNTER — HOSPITAL ENCOUNTER (OUTPATIENT)
Dept: CT IMAGING | Facility: HOSPITAL | Age: 84
Discharge: HOME | End: 2024-08-20
Admitting: INTERNAL MEDICINE
Payer: MEDICARE

## 2024-08-20 DIAGNOSIS — J84.9 ILD (INTERSTITIAL LUNG DISEASE): ICD-10-CM

## 2024-08-20 PROCEDURE — 71250 CT THORAX DX C-: CPT

## 2024-08-28 RX ORDER — LANOLIN ALCOHOL/MO/W.PET/CERES
CREAM (GRAM) TOPICAL
Qty: 90 TABLET | Refills: 3 | Status: SHIPPED | OUTPATIENT
Start: 2024-08-28

## 2024-09-23 ENCOUNTER — TRANSCRIBE ORDERS (OUTPATIENT)
Dept: ADMINISTRATIVE | Facility: HOSPITAL | Age: 84
End: 2024-09-23
Payer: MEDICARE

## 2024-09-23 DIAGNOSIS — J84.9 ILD (INTERSTITIAL LUNG DISEASE): Primary | ICD-10-CM

## 2024-09-30 DIAGNOSIS — I10 ESSENTIAL HYPERTENSION: ICD-10-CM

## 2024-09-30 RX ORDER — TRAZODONE HYDROCHLORIDE 50 MG/1
50 TABLET, FILM COATED ORAL NIGHTLY
Qty: 90 TABLET | Refills: 3 | Status: SHIPPED | OUTPATIENT
Start: 2024-09-30

## 2024-09-30 RX ORDER — METOPROLOL SUCCINATE 100 MG/1
100 TABLET, EXTENDED RELEASE ORAL DAILY
Qty: 90 TABLET | Refills: 3 | Status: SHIPPED | OUTPATIENT
Start: 2024-09-30

## 2024-11-04 RX ORDER — ERGOCALCIFEROL 1.25 MG/1
CAPSULE, LIQUID FILLED ORAL
Qty: 12 CAPSULE | Refills: 3 | Status: SHIPPED | OUTPATIENT
Start: 2024-11-04

## 2024-11-06 DIAGNOSIS — I10 ESSENTIAL HYPERTENSION: ICD-10-CM

## 2024-11-06 RX ORDER — AMLODIPINE BESYLATE 10 MG/1
10 TABLET ORAL DAILY
Qty: 90 TABLET | Refills: 3 | Status: SHIPPED | OUTPATIENT
Start: 2024-11-06

## 2024-12-11 DIAGNOSIS — E11.9 TYPE 2 DIABETES MELLITUS WITHOUT COMPLICATION, UNSPECIFIED WHETHER LONG TERM INSULIN USE: Primary | ICD-10-CM

## 2024-12-11 DIAGNOSIS — I10 ESSENTIAL HYPERTENSION: ICD-10-CM

## 2024-12-11 DIAGNOSIS — E78.2 MIXED HYPERLIPIDEMIA: ICD-10-CM

## 2024-12-11 DIAGNOSIS — E53.8 B12 DEFICIENCY: ICD-10-CM

## 2024-12-11 DIAGNOSIS — E55.9 VITAMIN D DEFICIENCY: ICD-10-CM

## 2024-12-12 LAB
25(OH)D3+25(OH)D2 SERPL-MCNC: 49.5 NG/ML (ref 30–100)
ALBUMIN SERPL-MCNC: 4.1 G/DL (ref 3.7–4.7)
ALP SERPL-CCNC: 59 IU/L (ref 44–121)
ALT SERPL-CCNC: 14 IU/L (ref 0–44)
AST SERPL-CCNC: 19 IU/L (ref 0–40)
BASOPHILS # BLD AUTO: 0 X10E3/UL (ref 0–0.2)
BASOPHILS NFR BLD AUTO: 1 %
BILIRUB SERPL-MCNC: 0.6 MG/DL (ref 0–1.2)
BUN SERPL-MCNC: 27 MG/DL (ref 8–27)
BUN/CREAT SERPL: 30 (ref 10–24)
CALCIUM SERPL-MCNC: 9.2 MG/DL (ref 8.6–10.2)
CHLORIDE SERPL-SCNC: 102 MMOL/L (ref 96–106)
CHOLEST SERPL-MCNC: 185 MG/DL (ref 100–199)
CO2 SERPL-SCNC: 25 MMOL/L (ref 20–29)
CREAT SERPL-MCNC: 0.91 MG/DL (ref 0.76–1.27)
EGFRCR SERPLBLD CKD-EPI 2021: 83 ML/MIN/1.73
EOSINOPHIL # BLD AUTO: 0.3 X10E3/UL (ref 0–0.4)
EOSINOPHIL NFR BLD AUTO: 4 %
ERYTHROCYTE [DISTWIDTH] IN BLOOD BY AUTOMATED COUNT: 12.2 % (ref 11.6–15.4)
GLOBULIN SER CALC-MCNC: 2.4 G/DL (ref 1.5–4.5)
GLUCOSE SERPL-MCNC: 104 MG/DL (ref 70–99)
HBA1C MFR BLD: 6 % (ref 4.8–5.6)
HCT VFR BLD AUTO: 46.1 % (ref 37.5–51)
HDLC SERPL-MCNC: 78 MG/DL
HGB BLD-MCNC: 15.6 G/DL (ref 13–17.7)
IMM GRANULOCYTES # BLD AUTO: 0 X10E3/UL (ref 0–0.1)
IMM GRANULOCYTES NFR BLD AUTO: 0 %
LDLC SERPL CALC-MCNC: 93 MG/DL (ref 0–99)
LYMPHOCYTES # BLD AUTO: 1.3 X10E3/UL (ref 0.7–3.1)
LYMPHOCYTES NFR BLD AUTO: 15 %
MCH RBC QN AUTO: 31.6 PG (ref 26.6–33)
MCHC RBC AUTO-ENTMCNC: 33.8 G/DL (ref 31.5–35.7)
MCV RBC AUTO: 93 FL (ref 79–97)
MONOCYTES # BLD AUTO: 0.6 X10E3/UL (ref 0.1–0.9)
MONOCYTES NFR BLD AUTO: 8 %
NEUTROPHILS # BLD AUTO: 6 X10E3/UL (ref 1.4–7)
NEUTROPHILS NFR BLD AUTO: 72 %
PLATELET # BLD AUTO: 255 X10E3/UL (ref 150–450)
POTASSIUM SERPL-SCNC: 4.2 MMOL/L (ref 3.5–5.2)
PROT SERPL-MCNC: 6.5 G/DL (ref 6–8.5)
RBC # BLD AUTO: 4.94 X10E6/UL (ref 4.14–5.8)
SODIUM SERPL-SCNC: 141 MMOL/L (ref 134–144)
TRIGL SERPL-MCNC: 78 MG/DL (ref 0–149)
TSH SERPL DL<=0.005 MIU/L-ACNC: 1.16 UIU/ML (ref 0.45–4.5)
UNABLE TO VOID: NORMAL
VIT B12 SERPL-MCNC: 908 PG/ML (ref 232–1245)
VLDLC SERPL CALC-MCNC: 14 MG/DL (ref 5–40)
WBC # BLD AUTO: 8.3 X10E3/UL (ref 3.4–10.8)

## 2024-12-12 RX ORDER — RIVASTIGMINE 13.3 MG/24H
PATCH, EXTENDED RELEASE TRANSDERMAL
Qty: 90 PATCH | Refills: 3 | Status: SHIPPED | OUTPATIENT
Start: 2024-12-12

## 2024-12-18 ENCOUNTER — OFFICE VISIT (OUTPATIENT)
Dept: INTERNAL MEDICINE | Facility: CLINIC | Age: 84
End: 2024-12-18
Payer: MEDICARE

## 2024-12-18 VITALS
OXYGEN SATURATION: 98 % | HEIGHT: 70 IN | HEART RATE: 76 BPM | SYSTOLIC BLOOD PRESSURE: 120 MMHG | DIASTOLIC BLOOD PRESSURE: 82 MMHG | BODY MASS INDEX: 18.9 KG/M2 | WEIGHT: 132 LBS

## 2024-12-18 DIAGNOSIS — R63.4 UNEXPLAINED WEIGHT LOSS: ICD-10-CM

## 2024-12-18 DIAGNOSIS — Z85.51 HISTORY OF BLADDER CANCER: ICD-10-CM

## 2024-12-18 DIAGNOSIS — E78.2 MIXED HYPERLIPIDEMIA: ICD-10-CM

## 2024-12-18 DIAGNOSIS — F02.80 FRONTOTEMPORAL DEMENTIA: ICD-10-CM

## 2024-12-18 DIAGNOSIS — E11.9 TYPE 2 DIABETES MELLITUS WITHOUT COMPLICATION, UNSPECIFIED WHETHER LONG TERM INSULIN USE: ICD-10-CM

## 2024-12-18 DIAGNOSIS — Z00.00 WELL ADULT EXAM: ICD-10-CM

## 2024-12-18 DIAGNOSIS — G31.09 FRONTOTEMPORAL DEMENTIA: ICD-10-CM

## 2024-12-18 DIAGNOSIS — I10 ESSENTIAL HYPERTENSION: ICD-10-CM

## 2024-12-18 DIAGNOSIS — Z00.00 MEDICARE ANNUAL WELLNESS VISIT, SUBSEQUENT: Primary | ICD-10-CM

## 2024-12-18 PROCEDURE — 99397 PER PM REEVAL EST PAT 65+ YR: CPT | Performed by: INTERNAL MEDICINE

## 2024-12-18 PROCEDURE — 1126F AMNT PAIN NOTED NONE PRSNT: CPT | Performed by: INTERNAL MEDICINE

## 2024-12-18 PROCEDURE — G0439 PPPS, SUBSEQ VISIT: HCPCS | Performed by: INTERNAL MEDICINE

## 2024-12-18 PROCEDURE — 3079F DIAST BP 80-89 MM HG: CPT | Performed by: INTERNAL MEDICINE

## 2024-12-18 PROCEDURE — 1160F RVW MEDS BY RX/DR IN RCRD: CPT | Performed by: INTERNAL MEDICINE

## 2024-12-18 PROCEDURE — 1159F MED LIST DOCD IN RCRD: CPT | Performed by: INTERNAL MEDICINE

## 2024-12-18 PROCEDURE — 3074F SYST BP LT 130 MM HG: CPT | Performed by: INTERNAL MEDICINE

## 2024-12-18 PROCEDURE — 1170F FXNL STATUS ASSESSED: CPT | Performed by: INTERNAL MEDICINE

## 2024-12-18 PROCEDURE — 96160 PT-FOCUSED HLTH RISK ASSMT: CPT | Performed by: INTERNAL MEDICINE

## 2024-12-18 NOTE — PATIENT INSTRUCTIONS
Medicare Wellness  Personal Prevention Plan of Service     Date of Office Visit:    Encounter Provider:  Karlie Hart MD  Place of Service:  [facility]  Patient Name: Calos Lam  :  1940    As part of the Medicare Wellness portion of your visit today, we are providing you with this personalized preventive plan of services (PPPS). This plan is based upon recommendations of the [place] Preventive Services Task Force (USPSTF) and the Advisory Committee on Immunization Practices (ACIP).    This lists the preventive care services that should be considered, and provides dates of when you are due. Items listed as completed are up-to-date and do not require any further intervention.    Health Maintenance   Topic Date Due    RSV Vaccine - Adults (1 - 1-dose 75+ series) Never done    TDAP/TD VACCINES (1 - Tdap) 2014    DIABETIC EYE EXAM  2023    ANNUAL WELLNESS VISIT  2024    HEMOGLOBIN A1C  2025    LIPID PANEL  2025    COVID-19 Vaccine  Completed    INFLUENZA VACCINE  Completed    Pneumococcal Vaccine 65+  Completed    URINE MICROALBUMIN  Discontinued    ZOSTER VACCINE  Discontinued    COLORECTAL CANCER SCREENING  Discontinued       No orders of the defined types were placed in this encounter.      No follow-ups on file.

## 2024-12-18 NOTE — PROGRESS NOTES
Subjective   The ABCs of the Annual Wellness Visit  Medicare Wellness Visit      Calos Lam is a 83 y.o. patient who presents for a Medicare Wellness Visit.    The following portions of the patient's history were reviewed and   updated as appropriate: allergies, current medications, past family history, past medical history, past social history, past surgical history, and problem list.    Compared to one year ago, the patient's physical   health is the same.  Compared to one year ago, the patient's mental   health is the same.    Recent Hospitalizations:  He was not admitted to the hospital during the last year.     Current Medical Providers:  Patient Care Team:  Karlie Hart MD as PCP - General (Internal Medicine)  Hayden Santiago MD as Neurologist (Neurology)    Outpatient Medications Prior to Visit   Medication Sig Dispense Refill    amLODIPine (NORVASC) 10 MG tablet TAKE 1 TABLET EVERY DAY 90 tablet 3    losartan-hydrochlorothiazide (Hyzaar) 100-25 MG per tablet Take 1 tablet by mouth Daily. 90 tablet 3    metoprolol succinate XL (TOPROL-XL) 100 MG 24 hr tablet TAKE 1 TABLET EVERY DAY 90 tablet 3    multivitamin with minerals (MULTIVITAMIN MEN PO) Take 1 tablet by mouth Daily.      pravastatin (PRAVACHOL) 40 MG tablet TAKE 1 TABLET EVERY DAY 90 tablet 3    rivastigmine (EXELON) 13.3 MG/24HR patch PLACE 1 PATCH ON THE SKIN DAILY AS DIRECTED BY PROVIDER . 90 patch 3    tamsulosin (FLOMAX) 0.4 MG capsule 24 hr capsule Take 1 capsule by mouth Daily. 90 capsule 3    traZODone (DESYREL) 50 MG tablet TAKE 1 TABLET EVERY NIGHT 90 tablet 3    vitamin B-12 (CYANOCOBALAMIN) 1000 MCG tablet TAKE 1 TABLET EVERY DAY 90 tablet 3    vitamin D (ERGOCALCIFEROL) 1.25 MG (97651 UT) capsule capsule TAKE 1 CAPSULE EVERY 7 DAYS 12 capsule 3     No facility-administered medications prior to visit.     No opioid medication identified on active medication list. I have reviewed chart for other potential  high risk medication/s  "and harmful drug interactions in the elderly.      Aspirin is not on active medication list.  Aspirin use is not indicated based on review of current medical condition/s. Risk of harm outweighs potential benefits.  .    Patient Active Problem List   Diagnosis    Sleep-related hypoventilation/hypoxia     Frontotemporal dementia    Essential hypertension    Mixed hyperlipidemia    Type 2 diabetes mellitus without complication    History of bladder cancer    RLS (restless legs syndrome)    PLMD (periodic limb movement disorder)    Vitamin D deficiency    Incomplete RBBB    B12 deficiency    BPH (benign prostatic hyperplasia)     Advance Care Planning Advance Directive is not on file.  ACP discussion was held with the patient during this visit. Patient has an advance directive (not in EMR), copy requested.            Objective   Vitals:    24 1305   BP: 120/82   Pulse: 76   SpO2: 98%   Weight: 59.9 kg (132 lb)   Height: 177.8 cm (70\")       Estimated body mass index is 18.94 kg/m² as calculated from the following:    Height as of this encounter: 177.8 cm (70\").    Weight as of this encounter: 59.9 kg (132 lb).    BMI is within normal parameters. No other follow-up for BMI required.           Does the patient have evidence of cognitive impairment? No  Lab Results   Component Value Date    CHLPL 185 2024    TRIG 78 2024    HDL 78 2024    LDL 93 2024    VLDL 14 2024    HGBA1C 6.0 (H) 2024                                                                                                Health  Risk Assessment    Smoking Status:  Social History     Tobacco Use   Smoking Status Former    Current packs/day: 0.00    Types: Cigarettes    Quit date: 1973    Years since quittin.9   Smokeless Tobacco Never     Alcohol Consumption:  Social History     Substance and Sexual Activity   Alcohol Use No       Fall Risk Screen  STEADI Fall Risk Assessment was completed, and patient is at LOW risk " for falls.Assessment completed on:2024    Depression Screening   Little interest or pleasure in doing things? Not at all   Feeling down, depressed, or hopeless? Not at all   PHQ-2 Total Score 0      Health Habits and Functional and Cognitive Screenin/18/2024     1:04 PM   Functional & Cognitive Status   Do you have difficulty preparing food and eating? No   Do you have difficulty bathing yourself, getting dressed or grooming yourself? No   Do you have difficulty using the toilet? No   Do you have difficulty moving around from place to place? No   Do you have trouble with steps or getting out of a bed or a chair? No   Current Diet Well Balanced Diet   Dental Exam Up to date   Eye Exam Up to date   Exercise (times per week) 0 times per week   Current Exercises Include No Regular Exercise   Do you need help using the phone?  No   Are you deaf or do you have serious difficulty hearing?  No   Do you need help to go to places out of walking distance? Yes   Do you need help shopping? No   Do you need help preparing meals?  No   Do you need help with housework?  No   Do you need help with laundry? No   Do you need help taking your medications? No   Do you need help managing money? Yes   Do you ever drive or ride in a car without wearing a seat belt? No   Have you felt unusual stress, anger or loneliness in the last month? No   Who do you live with? Spouse   If you need help, do you have trouble finding someone available to you? No   Have you been bothered in the last four weeks by sexual problems? No   Do you have difficulty concentrating, remembering or making decisions? Yes           Age-appropriate Screening Schedule:  Refer to the list below for future screening recommendations based on patient's age, sex and/or medical conditions. Orders for these recommended tests are listed in the plan section. The patient has been provided with a written plan.    Health Maintenance List  Health Maintenance   Topic  "Date Due    RSV Vaccine - Adults (1 - 1-dose 75+ series) Never done    TDAP/TD VACCINES (1 - Tdap) 05/05/2014    DIABETIC EYE EXAM  07/19/2023    ANNUAL WELLNESS VISIT  12/12/2024    HEMOGLOBIN A1C  06/11/2025    LIPID PANEL  12/11/2025    COVID-19 Vaccine  Completed    INFLUENZA VACCINE  Completed    Pneumococcal Vaccine 65+  Completed    URINE MICROALBUMIN  Discontinued    ZOSTER VACCINE  Discontinued    COLORECTAL CANCER SCREENING  Discontinued                                                                                                                                                CMS Preventative Services Quick Reference  Risk Factors Identified During Encounter  Immunizations Discussed/Encouraged: RSV (Respiratory Syncytial Virus)    The above risks/problems have been discussed with the patient.  Pertinent information has been shared with the patient in the After Visit Summary.  An After Visit Summary and PPPS were made available to the patient.    Follow Up:   Next Medicare Wellness visit to be scheduled in 1 year.         Additional E&M Note during same encounter follows:  Patient has additional, significant, and separately identifiable condition(s)/problem(s) that require work above and beyond the Medicare Wellness Visit     Chief Complaint  Medicare Wellness-subsequent    Subjective   HPI  Calos is also being seen today for an annual adult preventative physical exam.     C/o weight loss.  Twenty pounds in the last one year.  He is very active.  His wife says he eats well.      Review of Systems   Respiratory: Negative.     Cardiovascular: Negative.               Objective   Vital Signs:  /82   Pulse 76   Ht 177.8 cm (70\")   Wt 59.9 kg (132 lb)   SpO2 98%   BMI 18.94 kg/m²   Physical Exam  Constitutional:       Appearance: He is well-developed.   HENT:      Head: Atraumatic.      Right Ear: Hearing and tympanic membrane normal. There is impacted cerumen.      Left Ear: Hearing and tympanic " membrane normal. There is impacted cerumen.      Mouth/Throat:      Pharynx: No oropharyngeal exudate or posterior oropharyngeal erythema.   Cardiovascular:      Rate and Rhythm: Normal rate and regular rhythm.      Heart sounds: Normal heart sounds.   Pulmonary:      Effort: Pulmonary effort is normal.      Breath sounds: Normal breath sounds.   Musculoskeletal:      Right foot: Prominent metatarsal heads present.      Left foot: Prominent metatarsal heads present.   Feet:      Right foot:      Protective Sensation: 6 sites tested.  6 sites sensed.      Toenail Condition: Right toenails are normal.      Left foot:      Protective Sensation: 6 sites tested.  6 sites sensed.      Toenail Condition: Left toenails are normal.   Skin:     General: Skin is warm and dry.   Neurological:      Mental Status: He is alert and oriented to person, place, and time.         The following data was reviewed by: Karlie Hart MD on 12/18/2024:    Common labs          4/23/2024    14:37 6/5/2024    13:11 12/11/2024    13:43   Common Labs   Glucose 131  207  104    BUN 18  23  27    Creatinine 0.96  1.14  0.91    Sodium 139  140  141    Potassium 3.9  3.9  4.2    Chloride 99  101  102    Calcium 9.7  9.1  9.2    Total Protein  6.0  6.5    Albumin  3.8  4.1    Total Bilirubin  0.4  0.6    Alkaline Phosphatase  47  59    AST (SGOT)  18  19    ALT (SGPT)  11  14    WBC  7.86  8.3    Hemoglobin  13.9  15.6    Hematocrit  42.1  46.1    Platelets  217  255    Total Cholesterol  160  185    Triglycerides  137  78    HDL Cholesterol  57  78    LDL Cholesterol   79  93    Hemoglobin A1C  6.20  6.0              Assessment and Plan Additional age appropriate preventative wellness advice topics were discussed during today's preventative wellness exam(some topics already addressed during AWV portion of the note above):    Physical Activity: Advised cardiovascular activity 150 minutes per week as tolerated. (example brisk walk for 30 minutes, 5  days a week).           Medicare annual wellness visit, subsequent         Well adult exam         Type 2 diabetes mellitus without complication, unspecified whether long term insulin use           Essential hypertension           Mixed hyperlipidemia            Unexplained weight loss    Orders:    CT Abdomen Pelvis With Contrast; Future    Frontotemporal dementia         History of bladder cancer    Orders:    CT Abdomen Pelvis With Contrast; Future    Discussed importance of preventative care including vaccinations, age appropriate cancer screening, routine lab work, healthy diet, and active lifestyle.   Dm-2.  Control is good.  The patient is advised to continue current healthy diet.    HTN. Control is good.  The patient is advised to continue current dosage of metoprolol, amlodipine, losartan HCT.    HLD.  Control is good.  The patient is advised to continue current dosage of pravastatin.    Frontotemporal dementia.  Stable.  [facility]  I needs to get the report.    Unexplained weight loss.  Discussed additional of nutrition shakes.  Recent CT chest.  CT abd/pelvis is urdered to rule out malignancy.  He does have history of bladder cancer.    Bilateral ceruminosis.  He is advised to go to ENT walk in clinic for removal.         Follow Up   Return in about 6 months (around 6/18/2025) for Recheck.  Patient was given instructions and counseling regarding his condition or for health maintenance advice. Please see specific information pulled into the AVS if appropriate.

## 2025-01-16 RX ORDER — LOSARTAN POTASSIUM AND HYDROCHLOROTHIAZIDE 25; 100 MG/1; MG/1
1 TABLET ORAL DAILY
Qty: 90 TABLET | Refills: 3 | Status: SHIPPED | OUTPATIENT
Start: 2025-01-16

## 2025-03-26 RX ORDER — PRAVASTATIN SODIUM 40 MG
40 TABLET ORAL DAILY
Qty: 90 TABLET | Refills: 3 | Status: SHIPPED | OUTPATIENT
Start: 2025-03-26

## 2025-05-15 ENCOUNTER — OFFICE VISIT (OUTPATIENT)
Dept: NEUROLOGY | Facility: CLINIC | Age: 85
End: 2025-05-15
Payer: MEDICARE

## 2025-05-15 VITALS
HEIGHT: 70 IN | DIASTOLIC BLOOD PRESSURE: 60 MMHG | BODY MASS INDEX: 18.47 KG/M2 | OXYGEN SATURATION: 95 % | WEIGHT: 129 LBS | SYSTOLIC BLOOD PRESSURE: 142 MMHG | HEART RATE: 70 BPM

## 2025-05-15 DIAGNOSIS — G31.09 FRONTOTEMPORAL DEMENTIA: Primary | ICD-10-CM

## 2025-05-15 DIAGNOSIS — F02.80 FRONTOTEMPORAL DEMENTIA: Primary | ICD-10-CM

## 2025-05-15 DIAGNOSIS — F03.90 DEMENTIA WITHOUT BEHAVIORAL DISTURBANCE: ICD-10-CM

## 2025-05-15 NOTE — PROGRESS NOTES
Subjective   Calos Lam is a 84 y.o. male presenting for follow up. Diagnosed with frontotemporal dementia in 2017 by Dr. Santiago. Has been taking Exelon patch 13.3 mg every 24 hours. His cognitive decline as been fairly slow up until this past year. He is accompanied by his wife who provides the majority of hte history.     He was wandering at night. They installed a security system. He does continue to do all of the work around the house. She reminds him but is unable to help him due to mobility issues.     He has FIORELLA that is treated with a mouthpiece. RLS treated with pramipexole.     History of heavy ETOH use, no longer drinks alcohol.     Appetite is good, although he continues to lose weight. They are planning to discuss this with his PCP. He does have high calorie shakes but she says he does not drink them.     History of Present Illness     Review of Systems    I have reviewed and confirmed the accuracy of the patient's history: Chief complaint, HPI, ROS, Subjective, and Past Family Social History as entered by the MA/LPN/RN. Presley Kyle, APRN 05/15/25      Objective     Physical Examination:  General Appearance:  Well developed, well nourished, well groomed, alert, and cooperative.  HEENT: Normocephalic.    Neck and Spine: Normal range of motion.  Normal alignment. No mass or tenderness. No bruits.  Cardiac: Regular rate and rhythm. No murmurs.  Peripheral Vasculature: Radial and pedal pulses are equal and symmetric.   Extremities: No edema or deformities. Normal joint ROM.  Skin: No rashes or birth marks.      Neurological examination:   Higher Integrative Function: Did not know year, season, month. Knew State, county, city, hospital, not floor. Could not remember any of hte 3 words for delayed recall. He was able to copy intersecting pentagons but could not draw a clock.   CN II: Pupils are equal, round and reactive to light. Normal visual acuity and visual fields.   CN III IV VI: Extraocular  movements are full without nystagmus.   CN V: Normal facial sensation and strength of muscles of mastication.   CN VII: Facial movements are symmetric. No weakness.   CN VIII: Auditory acuity is normal.  CN IX & X: Symmetric palatal movement.   CN XI: Sternocleidomastoid and trapezius are normal. No weakness.   CN XII: The tongue is midline. No atrophy or fasciculations.  Motor: Normal muscle strength, bulk and tone in upper and lower extremities. No fasciculations, rigidity, spasticity, or abnormal movements.   Sensation: Normal light touch, pinprick, vibration, temperature, and proprioception in the arms and legs.   Station and Gait: Normal gait and station.   Coordination: Finger to nose test shows no dysmetria. Rapid alternating movements are normal. Heel to shin is normal.           Assessment & Plan   Diagnoses and all orders for this visit:    1. Frontotemporal dementia (Primary)    2. Dementia without behavioral disturbance    His dementia has progressed significantly over the last year. He scored a 19/20 on the MMSE   Discussed Leqembi but unfortunately MMSE score too low. Will continue Exelon 13.3/24 hours.   Encouraged high calorie foods, drinking shakes he has.   He remains very active.     Follow up 1 year, sooner if needed.      I spent 68 minutes caring for patient on todays date of service. This time includes time spent by me in the following activities: obtaining and/or reviewing a separately obtained history, performing a medically appropriate examination and/or evaluation, counseling and educating the patient on diagnosis and treatment, ordering medications, tests, or procedures, referring and communicating with other health care professionals and documenting information in the medical record.

## 2025-05-22 ENCOUNTER — TELEPHONE (OUTPATIENT)
Dept: NEUROLOGY | Facility: CLINIC | Age: 85
End: 2025-05-22
Payer: MEDICARE

## 2025-05-22 NOTE — TELEPHONE ENCOUNTER
Caller: Linda Lam    Relationship: Emergency Contact    Best call back number:   [phone]    What is the best time to reach you:     Who are you requesting to speak with (clinical staff, provider,  specific staff member):   AUDRA COVINGTON    Do you know the name of the person who called:   LINDA    What was the call regarding:   LINDA IS ASKING FOR AUDRA COVINGTON TO PLEASE CALL PT'S SON JILLIAN LAM [phone] TO LET HIM KNOW THE PT'S CONDITION.

## 2025-05-27 NOTE — TELEPHONE ENCOUNTER
Called patient's son. States he does not have any questions. Discussed importance of his dad not driving. Told him if he does have keys they need to take them away. He voiced understanding of this. Will call back should he have any questions.

## 2025-06-04 RX ORDER — LANOLIN ALCOHOL/MO/W.PET/CERES
1000 CREAM (GRAM) TOPICAL DAILY
Qty: 90 TABLET | Refills: 3 | Status: SHIPPED | OUTPATIENT
Start: 2025-06-04

## 2025-06-17 ENCOUNTER — HOSPITAL ENCOUNTER (OUTPATIENT)
Dept: CT IMAGING | Facility: HOSPITAL | Age: 85
Discharge: HOME | End: 2025-06-17
Admitting: INTERNAL MEDICINE
Payer: MEDICARE

## 2025-06-17 DIAGNOSIS — I10 ESSENTIAL HYPERTENSION: ICD-10-CM

## 2025-06-17 DIAGNOSIS — E78.2 MIXED HYPERLIPIDEMIA: Primary | ICD-10-CM

## 2025-06-17 DIAGNOSIS — E11.9 TYPE 2 DIABETES MELLITUS WITHOUT COMPLICATION, UNSPECIFIED WHETHER LONG TERM INSULIN USE: ICD-10-CM

## 2025-06-17 DIAGNOSIS — J84.9 ILD (INTERSTITIAL LUNG DISEASE): ICD-10-CM

## 2025-06-17 PROCEDURE — 71250 CT THORAX DX C-: CPT

## 2025-06-19 LAB
ALBUMIN SERPL-MCNC: 4 G/DL (ref 3.5–5.2)
ALBUMIN/GLOB SERPL: 1.7 G/DL
ALP SERPL-CCNC: 63 U/L (ref 39–117)
ALT SERPL-CCNC: 12 U/L (ref 1–41)
AST SERPL-CCNC: 18 U/L (ref 1–40)
BASOPHILS # BLD AUTO: 0.02 10*3/MM3 (ref 0–0.2)
BASOPHILS NFR BLD AUTO: 0.3 % (ref 0–1.5)
BILIRUB SERPL-MCNC: 0.6 MG/DL (ref 0–1.2)
BUN SERPL-MCNC: 24 MG/DL (ref 8–23)
BUN/CREAT SERPL: 18.6 (ref 7–25)
CALCIUM SERPL-MCNC: 9.1 MG/DL (ref 8.6–10.5)
CHLORIDE SERPL-SCNC: 100 MMOL/L (ref 98–107)
CHOLEST SERPL-MCNC: 191 MG/DL (ref 0–200)
CO2 SERPL-SCNC: 30 MMOL/L (ref 22–29)
CREAT SERPL-MCNC: 1.29 MG/DL (ref 0.76–1.27)
EGFRCR SERPLBLD CKD-EPI 2021: 54 ML/MIN/1.73
EOSINOPHIL # BLD AUTO: 0.07 10*3/MM3 (ref 0–0.4)
EOSINOPHIL NFR BLD AUTO: 1 % (ref 0.3–6.2)
ERYTHROCYTE [DISTWIDTH] IN BLOOD BY AUTOMATED COUNT: 12.9 % (ref 12.3–15.4)
GLOBULIN SER CALC-MCNC: 2.4 GM/DL
GLUCOSE SERPL-MCNC: 120 MG/DL (ref 65–99)
HBA1C MFR BLD: 5.7 % (ref 4.8–5.6)
HCT VFR BLD AUTO: 44 % (ref 37.5–51)
HDLC SERPL-MCNC: 73 MG/DL (ref 40–60)
HGB BLD-MCNC: 15.1 G/DL (ref 13–17.7)
IMM GRANULOCYTES # BLD AUTO: 0.01 10*3/MM3 (ref 0–0.05)
IMM GRANULOCYTES NFR BLD AUTO: 0.1 % (ref 0–0.5)
LDLC SERPL CALC-MCNC: 96 MG/DL (ref 0–100)
LYMPHOCYTES # BLD AUTO: 0.98 10*3/MM3 (ref 0.7–3.1)
LYMPHOCYTES NFR BLD AUTO: 13.6 % (ref 19.6–45.3)
MCH RBC QN AUTO: 32.3 PG (ref 26.6–33)
MCHC RBC AUTO-ENTMCNC: 34.3 G/DL (ref 31.5–35.7)
MCV RBC AUTO: 94.2 FL (ref 79–97)
MONOCYTES # BLD AUTO: 0.49 10*3/MM3 (ref 0.1–0.9)
MONOCYTES NFR BLD AUTO: 6.8 % (ref 5–12)
NEUTROPHILS # BLD AUTO: 5.65 10*3/MM3 (ref 1.7–7)
NEUTROPHILS NFR BLD AUTO: 78.2 % (ref 42.7–76)
NRBC BLD AUTO-RTO: 0 /100 WBC (ref 0–0.2)
PLATELET # BLD AUTO: 233 10*3/MM3 (ref 140–450)
POTASSIUM SERPL-SCNC: 4.3 MMOL/L (ref 3.5–5.2)
PROT SERPL-MCNC: 6.4 G/DL (ref 6–8.5)
RBC # BLD AUTO: 4.67 10*6/MM3 (ref 4.14–5.8)
SODIUM SERPL-SCNC: 141 MMOL/L (ref 136–145)
TRIGL SERPL-MCNC: 127 MG/DL (ref 0–150)
VLDLC SERPL CALC-MCNC: 22 MG/DL (ref 5–40)
WBC # BLD AUTO: 7.22 10*3/MM3 (ref 3.4–10.8)

## 2025-06-30 ENCOUNTER — TRANSCRIBE ORDERS (OUTPATIENT)
Dept: SLEEP MEDICINE | Facility: HOSPITAL | Age: 85
End: 2025-06-30
Payer: MEDICARE

## 2025-06-30 DIAGNOSIS — J84.9 ILD (INTERSTITIAL LUNG DISEASE): Primary | ICD-10-CM

## 2025-07-01 ENCOUNTER — OFFICE VISIT (OUTPATIENT)
Dept: INTERNAL MEDICINE | Facility: CLINIC | Age: 85
End: 2025-07-01
Payer: MEDICARE

## 2025-07-01 VITALS
BODY MASS INDEX: 17.61 KG/M2 | SYSTOLIC BLOOD PRESSURE: 106 MMHG | DIASTOLIC BLOOD PRESSURE: 66 MMHG | HEIGHT: 70 IN | OXYGEN SATURATION: 98 % | WEIGHT: 123 LBS | HEART RATE: 56 BPM

## 2025-07-01 DIAGNOSIS — I10 ESSENTIAL HYPERTENSION: ICD-10-CM

## 2025-07-01 DIAGNOSIS — E11.9 TYPE 2 DIABETES MELLITUS WITHOUT COMPLICATION, UNSPECIFIED WHETHER LONG TERM INSULIN USE: Primary | ICD-10-CM

## 2025-07-01 DIAGNOSIS — E78.2 MIXED HYPERLIPIDEMIA: ICD-10-CM

## 2025-07-01 DIAGNOSIS — R79.89 ABNORMAL BLOOD CREATININE LEVEL: ICD-10-CM

## 2025-07-02 NOTE — PROGRESS NOTES
Subjective     Calos Lam is a 84 y.o. male who presents with   Chief Complaint   Patient presents with    Diabetes    Hypertension    Hyperlipidemia       Diabetes  Hypertension  Hyperlipidemia         The following data was reviewed by: Karlie Hart MD on 07/01/2025:  Common labs          12/11/2024    13:43 6/18/2025    13:05   Common Labs   Glucose 104  120    BUN 27  24.0    Creatinine 0.91  1.29    Sodium 141  141    Potassium 4.2  4.3    Chloride 102  100    Calcium 9.2  9.1    Albumin 4.1  4.0    Total Bilirubin 0.6  0.6    Alkaline Phosphatase 59  63    AST (SGOT) 19  18    ALT (SGPT) 14  12    WBC 8.3  7.22    Hemoglobin 15.6  15.1    Hematocrit 46.1  44.0    Platelets 255  233    Total Cholesterol 185  191    Triglycerides 78  127    HDL Cholesterol 78  73    LDL Cholesterol  93  96    Hemoglobin A1C 6.0  5.70      DM-2.  Hgb A1c is at goal.   HTN.  Blood pressure is under good control.  HLD.  LDL cholesterol is under good control.      Kidney numbers slightly worse.  Per wife he drinks a pot of coffee a day and works in the heat a lot.      Review of Systems   Respiratory: Negative.     Cardiovascular: Negative.        The following portions of the patient's history were reviewed and updated as appropriate: allergies, current medications and problem list.    Patient Active Problem List    Diagnosis Date Noted    BPH (benign prostatic hyperplasia) 03/26/2024    B12 deficiency 09/11/2019     Note Last Updated: 9/16/2019     IF is normal.  Recommend oral supplement.        Vitamin D deficiency 03/07/2019    Incomplete RBBB 03/07/2019    PLMD (periodic limb movement disorder) 02/10/2018    History of bladder cancer 02/18/2017     Note Last Updated: 8/29/2022     2009 history of bladder cancer treated with BCG      RLS (restless legs syndrome) 02/18/2017    Type 2 diabetes mellitus without complication 02/15/2017    Essential hypertension     Mixed hyperlipidemia     Frontotemporal dementia  "01/18/2017    Sleep-related hypoventilation/hypoxia  12/11/2016       Current Outpatient Medications on File Prior to Visit   Medication Sig Dispense Refill    amLODIPine (NORVASC) 10 MG tablet TAKE 1 TABLET EVERY DAY 90 tablet 3    losartan-hydrochlorothiazide (HYZAAR) 100-25 MG per tablet TAKE 1 TABLET EVERY DAY 90 tablet 3    metoprolol succinate XL (TOPROL-XL) 100 MG 24 hr tablet TAKE 1 TABLET EVERY DAY 90 tablet 3    multivitamin with minerals (MULTIVITAMIN MEN PO) Take 1 tablet by mouth Daily.      pravastatin (PRAVACHOL) 40 MG tablet TAKE 1 TABLET EVERY DAY 90 tablet 3    rivastigmine (EXELON) 13.3 MG/24HR patch PLACE 1 PATCH ON THE SKIN DAILY AS DIRECTED BY PROVIDER . 90 patch 3    tamsulosin (FLOMAX) 0.4 MG capsule 24 hr capsule Take 1 capsule by mouth Daily. 90 capsule 3    traZODone (DESYREL) 50 MG tablet TAKE 1 TABLET EVERY NIGHT 90 tablet 3    vitamin B-12 (CYANOCOBALAMIN) 1000 MCG tablet TAKE 1 TABLET EVERY DAY 90 tablet 3    vitamin D (ERGOCALCIFEROL) 1.25 MG (38537 UT) capsule capsule TAKE 1 CAPSULE EVERY 7 DAYS 12 capsule 3     No current facility-administered medications on file prior to visit.       Objective     /66   Pulse 56   Ht 177.8 cm (70\")   Wt 55.8 kg (123 lb)   SpO2 98%   BMI 17.65 kg/m²     Physical Exam  Constitutional:       Appearance: He is well-developed.   HENT:      Head: Atraumatic.   Cardiovascular:      Rate and Rhythm: Normal rate and regular rhythm.      Heart sounds: Normal heart sounds.   Pulmonary:      Effort: Pulmonary effort is normal.      Breath sounds: Normal breath sounds.   Skin:     General: Skin is warm and dry.   Neurological:      Mental Status: He is alert and oriented to person, place, and time.         Assessment & Plan   Diagnoses and all orders for this visit:    1. Type 2 diabetes mellitus without complication, unspecified whether long term insulin use (Primary)    2. Essential hypertension    3. Mixed hyperlipidemia    4. Abnormal blood " creatinine level  -     Basic Metabolic Panel; Future        Discussion    Dm-2.  Control is good.  The patient is advised to continue current healthy diet.    HTN. Control is good.  The patient is advised to continue current dosage of metoprolol, amlodipine, losartan HCT.    HLD.  Control is good.  The patient is advised to continue current dosage of pravastatin.   Abnormal creatinine.  Labs look indicative of dehydration.  Decrease coffee and increase water.  Recheck BMP six weeks.      Reviewed and updated 7/2/2025  Future Appointments   Date Time Provider Department Center   8/12/2025  2:00 PM [facility]   12/29/2025  1:10 PM [facility]   1/2/2026  2:15 PM Karlie Hart MD [facility]

## 2025-07-21 RX ORDER — TRAZODONE HYDROCHLORIDE 50 MG/1
50 TABLET ORAL NIGHTLY
Qty: 90 TABLET | Refills: 3 | Status: SHIPPED | OUTPATIENT
Start: 2025-07-21

## 2025-08-07 DIAGNOSIS — I10 ESSENTIAL HYPERTENSION: ICD-10-CM

## 2025-08-07 RX ORDER — METOPROLOL SUCCINATE 100 MG/1
100 TABLET, EXTENDED RELEASE ORAL DAILY
Qty: 90 TABLET | Refills: 3 | Status: SHIPPED | OUTPATIENT
Start: 2025-08-07

## 2025-08-07 RX ORDER — ERGOCALCIFEROL 1.25 MG/1
50000 CAPSULE, LIQUID FILLED ORAL
Qty: 12 CAPSULE | Refills: 3 | Status: SHIPPED | OUTPATIENT
Start: 2025-08-07

## 2025-08-11 DIAGNOSIS — I10 ESSENTIAL HYPERTENSION: ICD-10-CM

## 2025-08-11 RX ORDER — AMLODIPINE BESYLATE 10 MG/1
10 TABLET ORAL DAILY
Qty: 90 TABLET | Refills: 3 | Status: SHIPPED | OUTPATIENT
Start: 2025-08-11

## 2025-08-27 ENCOUNTER — OFFICE VISIT (OUTPATIENT)
Dept: CARDIOLOGY | Age: 85
End: 2025-08-27
Payer: MEDICARE

## 2025-08-27 VITALS
HEART RATE: 46 BPM | BODY MASS INDEX: 18.75 KG/M2 | HEIGHT: 70 IN | DIASTOLIC BLOOD PRESSURE: 50 MMHG | WEIGHT: 131 LBS | SYSTOLIC BLOOD PRESSURE: 118 MMHG

## 2025-08-27 DIAGNOSIS — I25.2 HISTORY OF ST ELEVATION MYOCARDIAL INFARCTION (STEMI): ICD-10-CM

## 2025-08-27 DIAGNOSIS — I10 ESSENTIAL HYPERTENSION: ICD-10-CM

## 2025-08-27 DIAGNOSIS — Z95.5 S/P RIGHT CORONARY ARTERY (RCA) STENT PLACEMENT: ICD-10-CM

## 2025-08-27 DIAGNOSIS — I25.10 CORONARY ARTERY DISEASE INVOLVING NATIVE CORONARY ARTERY OF NATIVE HEART, UNSPECIFIED WHETHER ANGINA PRESENT: Primary | ICD-10-CM

## 2025-08-27 DIAGNOSIS — E78.2 MIXED HYPERLIPIDEMIA: ICD-10-CM

## 2025-08-27 PROCEDURE — 1160F RVW MEDS BY RX/DR IN RCRD: CPT | Performed by: INTERNAL MEDICINE

## 2025-08-27 PROCEDURE — 93000 ELECTROCARDIOGRAM COMPLETE: CPT | Performed by: INTERNAL MEDICINE

## 2025-08-27 PROCEDURE — 99204 OFFICE O/P NEW MOD 45 MIN: CPT | Performed by: INTERNAL MEDICINE

## 2025-08-27 PROCEDURE — 1159F MED LIST DOCD IN RCRD: CPT | Performed by: INTERNAL MEDICINE

## 2025-08-27 RX ORDER — METOPROLOL SUCCINATE 50 MG/1
100 TABLET, EXTENDED RELEASE ORAL NIGHTLY
Qty: 90 TABLET | Refills: 3 | Status: SHIPPED | OUTPATIENT
Start: 2025-08-27

## 2025-08-27 RX ORDER — ROSUVASTATIN CALCIUM 20 MG/1
20 TABLET, COATED ORAL NIGHTLY
Qty: 90 TABLET | Refills: 3 | Status: SHIPPED | OUTPATIENT
Start: 2025-08-27

## 2025-08-27 RX ORDER — AMLODIPINE BESYLATE 5 MG/1
5 TABLET ORAL NIGHTLY
Qty: 90 TABLET | Refills: 3 | Status: SHIPPED | OUTPATIENT
Start: 2025-08-27

## 2025-08-27 RX ORDER — ASPIRIN 81 MG/1
81 TABLET ORAL DAILY
Qty: 90 TABLET | Refills: 3 | Status: SHIPPED | OUTPATIENT
Start: 2025-08-27

## 2025-08-27 RX ORDER — TICAGRELOR 90 MG/1
90 TABLET, FILM COATED ORAL 2 TIMES DAILY
Qty: 180 TABLET | Refills: 3 | Status: SHIPPED | OUTPATIENT
Start: 2025-08-27

## 2025-08-29 RX ORDER — RIVASTIGMINE 13.3 MG/24H
1 PATCH, EXTENDED RELEASE TRANSDERMAL DAILY
Start: 2025-08-29